# Patient Record
Sex: MALE | Race: WHITE | NOT HISPANIC OR LATINO | Employment: OTHER | URBAN - METROPOLITAN AREA
[De-identification: names, ages, dates, MRNs, and addresses within clinical notes are randomized per-mention and may not be internally consistent; named-entity substitution may affect disease eponyms.]

---

## 2017-01-16 ENCOUNTER — OFFICE VISIT - HEALTHEAST (OUTPATIENT)
Dept: INFECTIOUS DISEASES | Facility: CLINIC | Age: 52
End: 2017-01-16

## 2017-01-16 DIAGNOSIS — M46.43 DISCITIS OF CERVICOTHORACIC REGION: ICD-10-CM

## 2017-01-30 ENCOUNTER — AMBULATORY - HEALTHEAST (OUTPATIENT)
Dept: ADDICTION MEDICINE | Facility: CLINIC | Age: 52
End: 2017-01-30

## 2017-02-03 ENCOUNTER — OFFICE VISIT - HEALTHEAST (OUTPATIENT)
Dept: ADDICTION MEDICINE | Facility: CLINIC | Age: 52
End: 2017-02-03

## 2017-02-03 DIAGNOSIS — F10.10 ALCOHOL ABUSE: ICD-10-CM

## 2017-02-06 ENCOUNTER — AMBULATORY - HEALTHEAST (OUTPATIENT)
Dept: BEHAVIORAL HEALTH | Facility: CLINIC | Age: 52
End: 2017-02-06

## 2017-02-07 ENCOUNTER — AMBULATORY - HEALTHEAST (OUTPATIENT)
Dept: ADDICTION MEDICINE | Facility: CLINIC | Age: 52
End: 2017-02-07

## 2017-02-13 ENCOUNTER — AMBULATORY - HEALTHEAST (OUTPATIENT)
Dept: ADDICTION MEDICINE | Facility: CLINIC | Age: 52
End: 2017-02-13

## 2017-02-13 ENCOUNTER — OFFICE VISIT - HEALTHEAST (OUTPATIENT)
Dept: ADDICTION MEDICINE | Facility: CLINIC | Age: 52
End: 2017-02-13

## 2017-02-13 DIAGNOSIS — F12.20 CANNABIS USE DISORDER, MODERATE, DEPENDENCE (H): ICD-10-CM

## 2017-02-13 DIAGNOSIS — F15.20 AMPHETAMINE USE DISORDER, SEVERE (H): ICD-10-CM

## 2017-02-14 ENCOUNTER — COMMUNICATION - HEALTHEAST (OUTPATIENT)
Dept: ADDICTION MEDICINE | Facility: CLINIC | Age: 52
End: 2017-02-14

## 2017-02-15 ENCOUNTER — AMBULATORY - HEALTHEAST (OUTPATIENT)
Dept: ADDICTION MEDICINE | Facility: CLINIC | Age: 52
End: 2017-02-15

## 2017-02-16 ENCOUNTER — OFFICE VISIT - HEALTHEAST (OUTPATIENT)
Dept: ADDICTION MEDICINE | Facility: CLINIC | Age: 52
End: 2017-02-16

## 2017-02-16 DIAGNOSIS — F15.20 AMPHETAMINE USE DISORDER, SEVERE (H): ICD-10-CM

## 2017-02-16 DIAGNOSIS — F12.20 CANNABIS USE DISORDER, MODERATE, DEPENDENCE (H): ICD-10-CM

## 2017-02-21 ENCOUNTER — AMBULATORY - HEALTHEAST (OUTPATIENT)
Dept: ADDICTION MEDICINE | Facility: CLINIC | Age: 52
End: 2017-02-21

## 2017-02-21 ENCOUNTER — OFFICE VISIT - HEALTHEAST (OUTPATIENT)
Dept: ADDICTION MEDICINE | Facility: CLINIC | Age: 52
End: 2017-02-21

## 2017-02-21 DIAGNOSIS — F15.20 AMPHETAMINE USE DISORDER, SEVERE (H): ICD-10-CM

## 2017-02-21 DIAGNOSIS — F12.20 CANNABIS USE DISORDER, MODERATE, DEPENDENCE (H): ICD-10-CM

## 2017-02-24 ENCOUNTER — AMBULATORY - HEALTHEAST (OUTPATIENT)
Dept: ADDICTION MEDICINE | Facility: CLINIC | Age: 52
End: 2017-02-24

## 2017-03-02 ENCOUNTER — OFFICE VISIT - HEALTHEAST (OUTPATIENT)
Dept: ADDICTION MEDICINE | Facility: CLINIC | Age: 52
End: 2017-03-02

## 2017-03-02 DIAGNOSIS — F15.20 AMPHETAMINE USE DISORDER, SEVERE (H): ICD-10-CM

## 2017-03-02 DIAGNOSIS — F12.20 CANNABIS USE DISORDER, MODERATE, DEPENDENCE (H): ICD-10-CM

## 2017-03-03 ENCOUNTER — AMBULATORY - HEALTHEAST (OUTPATIENT)
Dept: ADDICTION MEDICINE | Facility: CLINIC | Age: 52
End: 2017-03-03

## 2017-03-07 ENCOUNTER — OFFICE VISIT - HEALTHEAST (OUTPATIENT)
Dept: ADDICTION MEDICINE | Facility: CLINIC | Age: 52
End: 2017-03-07

## 2017-03-07 DIAGNOSIS — Z03.89 NO DIAGNOSIS ON AXIS I: ICD-10-CM

## 2017-03-09 ENCOUNTER — OFFICE VISIT - HEALTHEAST (OUTPATIENT)
Dept: ADDICTION MEDICINE | Facility: CLINIC | Age: 52
End: 2017-03-09

## 2017-03-09 ENCOUNTER — AMBULATORY - HEALTHEAST (OUTPATIENT)
Dept: ADDICTION MEDICINE | Facility: CLINIC | Age: 52
End: 2017-03-09

## 2017-03-09 DIAGNOSIS — F12.20 CANNABIS USE DISORDER, MODERATE, DEPENDENCE (H): ICD-10-CM

## 2017-03-09 DIAGNOSIS — F15.20 AMPHETAMINE USE DISORDER, SEVERE (H): ICD-10-CM

## 2017-03-10 ENCOUNTER — AMBULATORY - HEALTHEAST (OUTPATIENT)
Dept: ADDICTION MEDICINE | Facility: CLINIC | Age: 52
End: 2017-03-10

## 2017-03-14 ENCOUNTER — OFFICE VISIT - HEALTHEAST (OUTPATIENT)
Dept: ADDICTION MEDICINE | Facility: CLINIC | Age: 52
End: 2017-03-14

## 2017-03-14 DIAGNOSIS — F12.20 CANNABIS USE DISORDER, MODERATE, DEPENDENCE (H): ICD-10-CM

## 2017-03-14 DIAGNOSIS — F15.20 AMPHETAMINE USE DISORDER, SEVERE (H): ICD-10-CM

## 2017-03-14 DIAGNOSIS — F10.10 ALCOHOL ABUSE: ICD-10-CM

## 2017-03-16 ENCOUNTER — OFFICE VISIT - HEALTHEAST (OUTPATIENT)
Dept: ADDICTION MEDICINE | Facility: CLINIC | Age: 52
End: 2017-03-16

## 2017-03-16 DIAGNOSIS — F15.20 AMPHETAMINE USE DISORDER, SEVERE (H): ICD-10-CM

## 2017-03-16 DIAGNOSIS — F10.10 ALCOHOL ABUSE: ICD-10-CM

## 2017-03-16 DIAGNOSIS — F12.20 CANNABIS USE DISORDER, MODERATE, DEPENDENCE (H): ICD-10-CM

## 2017-03-17 ENCOUNTER — AMBULATORY - HEALTHEAST (OUTPATIENT)
Dept: ADDICTION MEDICINE | Facility: CLINIC | Age: 52
End: 2017-03-17

## 2017-03-21 ENCOUNTER — OFFICE VISIT - HEALTHEAST (OUTPATIENT)
Dept: ADDICTION MEDICINE | Facility: CLINIC | Age: 52
End: 2017-03-21

## 2017-03-21 DIAGNOSIS — F15.20 AMPHETAMINE USE DISORDER, SEVERE (H): ICD-10-CM

## 2017-03-21 DIAGNOSIS — F10.10 ALCOHOL ABUSE: ICD-10-CM

## 2017-03-21 DIAGNOSIS — F12.20 CANNABIS USE DISORDER, MODERATE, DEPENDENCE (H): ICD-10-CM

## 2017-03-23 ENCOUNTER — OFFICE VISIT - HEALTHEAST (OUTPATIENT)
Dept: BEHAVIORAL HEALTH | Facility: CLINIC | Age: 52
End: 2017-03-23

## 2017-03-23 ENCOUNTER — OFFICE VISIT - HEALTHEAST (OUTPATIENT)
Dept: ADDICTION MEDICINE | Facility: CLINIC | Age: 52
End: 2017-03-23

## 2017-03-23 DIAGNOSIS — S06.9X9S TBI (TRAUMATIC BRAIN INJURY), WITH LOSS OF CONSCIOUSNESS OF UNSPECIFIED DURATION, SEQUELA: ICD-10-CM

## 2017-03-23 DIAGNOSIS — F10.10 ALCOHOL ABUSE: ICD-10-CM

## 2017-03-23 DIAGNOSIS — F12.20 CANNABIS USE DISORDER, MODERATE, DEPENDENCE (H): ICD-10-CM

## 2017-03-23 DIAGNOSIS — F15.20 METHAMPHETAMINE USE DISORDER, SEVERE (H): ICD-10-CM

## 2017-03-23 DIAGNOSIS — F15.20 AMPHETAMINE USE DISORDER, SEVERE (H): ICD-10-CM

## 2017-03-23 DIAGNOSIS — F41.9 ANXIETY: ICD-10-CM

## 2017-03-24 ENCOUNTER — AMBULATORY - HEALTHEAST (OUTPATIENT)
Dept: ADDICTION MEDICINE | Facility: CLINIC | Age: 52
End: 2017-03-24

## 2017-03-27 ENCOUNTER — AMBULATORY - HEALTHEAST (OUTPATIENT)
Dept: ADDICTION MEDICINE | Facility: CLINIC | Age: 52
End: 2017-03-27

## 2017-03-28 ENCOUNTER — OFFICE VISIT - HEALTHEAST (OUTPATIENT)
Dept: BEHAVIORAL HEALTH | Facility: CLINIC | Age: 52
End: 2017-03-28

## 2017-03-28 DIAGNOSIS — F33.1 DEPRESSION, MAJOR, RECURRENT, MODERATE (H): ICD-10-CM

## 2017-03-28 DIAGNOSIS — F43.10 PTSD (POST-TRAUMATIC STRESS DISORDER): ICD-10-CM

## 2017-03-30 ENCOUNTER — OFFICE VISIT - HEALTHEAST (OUTPATIENT)
Dept: ADDICTION MEDICINE | Facility: CLINIC | Age: 52
End: 2017-03-30

## 2017-03-30 DIAGNOSIS — F10.10 ALCOHOL ABUSE: ICD-10-CM

## 2017-03-30 DIAGNOSIS — F15.20 AMPHETAMINE USE DISORDER, SEVERE (H): ICD-10-CM

## 2017-03-30 DIAGNOSIS — F12.20 CANNABIS USE DISORDER, MODERATE, DEPENDENCE (H): ICD-10-CM

## 2017-03-31 ENCOUNTER — AMBULATORY - HEALTHEAST (OUTPATIENT)
Dept: ADDICTION MEDICINE | Facility: CLINIC | Age: 52
End: 2017-03-31

## 2017-04-05 ENCOUNTER — COMMUNICATION - HEALTHEAST (OUTPATIENT)
Dept: ADDICTION MEDICINE | Facility: CLINIC | Age: 52
End: 2017-04-05

## 2017-04-06 ENCOUNTER — COMMUNICATION - HEALTHEAST (OUTPATIENT)
Dept: ADDICTION MEDICINE | Facility: CLINIC | Age: 52
End: 2017-04-06

## 2017-04-11 ENCOUNTER — OFFICE VISIT - HEALTHEAST (OUTPATIENT)
Dept: ADDICTION MEDICINE | Facility: CLINIC | Age: 52
End: 2017-04-11

## 2017-04-11 DIAGNOSIS — F15.20 AMPHETAMINE USE DISORDER, SEVERE (H): ICD-10-CM

## 2017-04-11 DIAGNOSIS — F12.20 CANNABIS USE DISORDER, MODERATE, DEPENDENCE (H): ICD-10-CM

## 2017-04-12 ENCOUNTER — OFFICE VISIT - HEALTHEAST (OUTPATIENT)
Dept: BEHAVIORAL HEALTH | Facility: CLINIC | Age: 52
End: 2017-04-12

## 2017-04-12 DIAGNOSIS — F15.20 METHAMPHETAMINE USE DISORDER, SEVERE (H): ICD-10-CM

## 2017-04-12 DIAGNOSIS — F33.1 DEPRESSION, MAJOR, RECURRENT, MODERATE (H): ICD-10-CM

## 2017-04-12 DIAGNOSIS — F43.10 PTSD (POST-TRAUMATIC STRESS DISORDER): ICD-10-CM

## 2017-04-13 ENCOUNTER — OFFICE VISIT - HEALTHEAST (OUTPATIENT)
Dept: ADDICTION MEDICINE | Facility: CLINIC | Age: 52
End: 2017-04-13

## 2017-04-13 DIAGNOSIS — F12.20 CANNABIS USE DISORDER, MODERATE, DEPENDENCE (H): ICD-10-CM

## 2017-04-14 ENCOUNTER — AMBULATORY - HEALTHEAST (OUTPATIENT)
Dept: ADDICTION MEDICINE | Facility: CLINIC | Age: 52
End: 2017-04-14

## 2017-04-18 ENCOUNTER — OFFICE VISIT - HEALTHEAST (OUTPATIENT)
Dept: ADDICTION MEDICINE | Facility: CLINIC | Age: 52
End: 2017-04-18

## 2017-04-18 DIAGNOSIS — F15.20 AMPHETAMINE USE DISORDER, SEVERE (H): ICD-10-CM

## 2017-04-18 DIAGNOSIS — F12.20 CANNABIS USE DISORDER, MODERATE, DEPENDENCE (H): ICD-10-CM

## 2017-04-21 ENCOUNTER — COMMUNICATION - HEALTHEAST (OUTPATIENT)
Dept: ADDICTION MEDICINE | Facility: CLINIC | Age: 52
End: 2017-04-21

## 2017-04-25 ENCOUNTER — OFFICE VISIT - HEALTHEAST (OUTPATIENT)
Dept: ADDICTION MEDICINE | Facility: CLINIC | Age: 52
End: 2017-04-25

## 2017-04-25 DIAGNOSIS — F12.20 CANNABIS USE DISORDER, MODERATE, DEPENDENCE (H): ICD-10-CM

## 2017-04-25 DIAGNOSIS — F15.20 AMPHETAMINE USE DISORDER, SEVERE (H): ICD-10-CM

## 2017-04-27 ENCOUNTER — OFFICE VISIT - HEALTHEAST (OUTPATIENT)
Dept: ADDICTION MEDICINE | Facility: CLINIC | Age: 52
End: 2017-04-27

## 2017-04-27 DIAGNOSIS — F15.20 AMPHETAMINE USE DISORDER, SEVERE (H): ICD-10-CM

## 2017-04-27 DIAGNOSIS — F12.20 CANNABIS USE DISORDER, MODERATE, DEPENDENCE (H): ICD-10-CM

## 2017-04-28 ENCOUNTER — AMBULATORY - HEALTHEAST (OUTPATIENT)
Dept: ADDICTION MEDICINE | Facility: CLINIC | Age: 52
End: 2017-04-28

## 2017-05-02 ENCOUNTER — OFFICE VISIT - HEALTHEAST (OUTPATIENT)
Dept: ADDICTION MEDICINE | Facility: CLINIC | Age: 52
End: 2017-05-02

## 2017-05-02 DIAGNOSIS — F12.20 CANNABIS USE DISORDER, MODERATE, DEPENDENCE (H): ICD-10-CM

## 2017-05-02 DIAGNOSIS — F15.20 AMPHETAMINE USE DISORDER, SEVERE (H): ICD-10-CM

## 2017-05-05 ENCOUNTER — AMBULATORY - HEALTHEAST (OUTPATIENT)
Dept: ADDICTION MEDICINE | Facility: CLINIC | Age: 52
End: 2017-05-05

## 2017-05-30 ENCOUNTER — AMBULATORY - HEALTHEAST (OUTPATIENT)
Dept: ADDICTION MEDICINE | Facility: CLINIC | Age: 52
End: 2017-05-30

## 2017-06-01 ENCOUNTER — OFFICE VISIT - HEALTHEAST (OUTPATIENT)
Dept: ADDICTION MEDICINE | Facility: CLINIC | Age: 52
End: 2017-06-01

## 2017-06-01 DIAGNOSIS — F12.20 CANNABIS USE DISORDER, MODERATE, DEPENDENCE (H): ICD-10-CM

## 2017-06-01 DIAGNOSIS — F15.20 AMPHETAMINE USE DISORDER, SEVERE (H): ICD-10-CM

## 2017-06-01 DIAGNOSIS — F10.10 ALCOHOL ABUSE: ICD-10-CM

## 2017-06-02 ENCOUNTER — COMMUNICATION - HEALTHEAST (OUTPATIENT)
Dept: ADDICTION MEDICINE | Facility: CLINIC | Age: 52
End: 2017-06-02

## 2017-06-02 ENCOUNTER — AMBULATORY - HEALTHEAST (OUTPATIENT)
Dept: ADDICTION MEDICINE | Facility: CLINIC | Age: 52
End: 2017-06-02

## 2017-06-05 ENCOUNTER — AMBULATORY - HEALTHEAST (OUTPATIENT)
Dept: ADDICTION MEDICINE | Facility: CLINIC | Age: 52
End: 2017-06-05

## 2017-06-08 ENCOUNTER — AMBULATORY - HEALTHEAST (OUTPATIENT)
Dept: LAB | Facility: CLINIC | Age: 52
End: 2017-06-08

## 2017-06-08 ENCOUNTER — OFFICE VISIT - HEALTHEAST (OUTPATIENT)
Dept: ADDICTION MEDICINE | Facility: CLINIC | Age: 52
End: 2017-06-08

## 2017-06-08 DIAGNOSIS — F15.20 AMPHETAMINE USE DISORDER, SEVERE (H): ICD-10-CM

## 2017-06-08 DIAGNOSIS — F12.20 CANNABIS USE DISORDER, MODERATE, DEPENDENCE (H): ICD-10-CM

## 2017-06-13 ENCOUNTER — OFFICE VISIT - HEALTHEAST (OUTPATIENT)
Dept: ADDICTION MEDICINE | Facility: CLINIC | Age: 52
End: 2017-06-13

## 2017-06-13 DIAGNOSIS — F15.20 AMPHETAMINE USE DISORDER, SEVERE (H): ICD-10-CM

## 2017-06-13 DIAGNOSIS — F12.20 CANNABIS USE DISORDER, MODERATE, DEPENDENCE (H): ICD-10-CM

## 2017-06-15 ENCOUNTER — AMBULATORY - HEALTHEAST (OUTPATIENT)
Dept: ADDICTION MEDICINE | Facility: CLINIC | Age: 52
End: 2017-06-15

## 2017-06-15 ENCOUNTER — OFFICE VISIT - HEALTHEAST (OUTPATIENT)
Dept: ADDICTION MEDICINE | Facility: CLINIC | Age: 52
End: 2017-06-15

## 2017-06-15 DIAGNOSIS — F12.20 CANNABIS USE DISORDER, MODERATE, DEPENDENCE (H): ICD-10-CM

## 2017-06-15 DIAGNOSIS — F15.20 AMPHETAMINE USE DISORDER, SEVERE (H): ICD-10-CM

## 2017-06-16 ENCOUNTER — AMBULATORY - HEALTHEAST (OUTPATIENT)
Dept: ADDICTION MEDICINE | Facility: CLINIC | Age: 52
End: 2017-06-16

## 2017-06-20 ENCOUNTER — AMBULATORY - HEALTHEAST (OUTPATIENT)
Dept: LAB | Facility: CLINIC | Age: 52
End: 2017-06-20

## 2017-06-20 ENCOUNTER — OFFICE VISIT - HEALTHEAST (OUTPATIENT)
Dept: ADDICTION MEDICINE | Facility: CLINIC | Age: 52
End: 2017-06-20

## 2017-06-20 DIAGNOSIS — F15.20 AMPHETAMINE USE DISORDER, SEVERE (H): ICD-10-CM

## 2017-06-20 DIAGNOSIS — F12.20 CANNABIS USE DISORDER, MODERATE, DEPENDENCE (H): ICD-10-CM

## 2017-06-22 ENCOUNTER — OFFICE VISIT - HEALTHEAST (OUTPATIENT)
Dept: ADDICTION MEDICINE | Facility: CLINIC | Age: 52
End: 2017-06-22

## 2017-06-22 DIAGNOSIS — F15.20 AMPHETAMINE USE DISORDER, SEVERE (H): ICD-10-CM

## 2017-06-22 DIAGNOSIS — F12.20 CANNABIS USE DISORDER, MODERATE, DEPENDENCE (H): ICD-10-CM

## 2017-06-23 ENCOUNTER — AMBULATORY - HEALTHEAST (OUTPATIENT)
Dept: ADDICTION MEDICINE | Facility: CLINIC | Age: 52
End: 2017-06-23

## 2017-06-23 ENCOUNTER — COMMUNICATION - HEALTHEAST (OUTPATIENT)
Dept: ADDICTION MEDICINE | Facility: CLINIC | Age: 52
End: 2017-06-23

## 2017-06-27 ENCOUNTER — OFFICE VISIT - HEALTHEAST (OUTPATIENT)
Dept: ADDICTION MEDICINE | Facility: CLINIC | Age: 52
End: 2017-06-27

## 2017-06-27 ENCOUNTER — AMBULATORY - HEALTHEAST (OUTPATIENT)
Dept: LAB | Facility: CLINIC | Age: 52
End: 2017-06-27

## 2017-06-27 DIAGNOSIS — F15.20 AMPHETAMINE USE DISORDER, SEVERE (H): ICD-10-CM

## 2017-06-27 DIAGNOSIS — F12.20 CANNABIS USE DISORDER, MODERATE, DEPENDENCE (H): ICD-10-CM

## 2017-06-29 ENCOUNTER — AMBULATORY - HEALTHEAST (OUTPATIENT)
Dept: ADDICTION MEDICINE | Facility: CLINIC | Age: 52
End: 2017-06-29

## 2017-06-30 ENCOUNTER — AMBULATORY - HEALTHEAST (OUTPATIENT)
Dept: ADDICTION MEDICINE | Facility: CLINIC | Age: 52
End: 2017-06-30

## 2017-07-11 ENCOUNTER — AMBULATORY - HEALTHEAST (OUTPATIENT)
Dept: LAB | Facility: CLINIC | Age: 52
End: 2017-07-11

## 2017-07-11 ENCOUNTER — OFFICE VISIT - HEALTHEAST (OUTPATIENT)
Dept: ADDICTION MEDICINE | Facility: CLINIC | Age: 52
End: 2017-07-11

## 2017-07-11 DIAGNOSIS — F15.20 AMPHETAMINE USE DISORDER, SEVERE (H): ICD-10-CM

## 2017-07-11 DIAGNOSIS — F12.20 CANNABIS USE DISORDER, MODERATE, DEPENDENCE (H): ICD-10-CM

## 2017-07-13 ENCOUNTER — AMBULATORY - HEALTHEAST (OUTPATIENT)
Dept: LAB | Facility: CLINIC | Age: 52
End: 2017-07-13

## 2017-07-13 ENCOUNTER — OFFICE VISIT - HEALTHEAST (OUTPATIENT)
Dept: ADDICTION MEDICINE | Facility: CLINIC | Age: 52
End: 2017-07-13

## 2017-07-13 DIAGNOSIS — F15.20 AMPHETAMINE USE DISORDER, SEVERE (H): ICD-10-CM

## 2017-07-13 DIAGNOSIS — F12.20 CANNABIS USE DISORDER, MODERATE, DEPENDENCE (H): ICD-10-CM

## 2017-07-15 ENCOUNTER — AMBULATORY - HEALTHEAST (OUTPATIENT)
Dept: ADDICTION MEDICINE | Facility: CLINIC | Age: 52
End: 2017-07-15

## 2017-07-18 ENCOUNTER — OFFICE VISIT - HEALTHEAST (OUTPATIENT)
Dept: ADDICTION MEDICINE | Facility: CLINIC | Age: 52
End: 2017-07-18

## 2017-07-18 ENCOUNTER — AMBULATORY - HEALTHEAST (OUTPATIENT)
Dept: LAB | Facility: CLINIC | Age: 52
End: 2017-07-18

## 2017-07-18 DIAGNOSIS — F12.20 CANNABIS USE DISORDER, MODERATE, DEPENDENCE (H): ICD-10-CM

## 2017-07-18 DIAGNOSIS — F15.20 AMPHETAMINE USE DISORDER, SEVERE (H): ICD-10-CM

## 2017-07-20 ENCOUNTER — AMBULATORY - HEALTHEAST (OUTPATIENT)
Dept: LAB | Facility: CLINIC | Age: 52
End: 2017-07-20

## 2017-07-20 ENCOUNTER — OFFICE VISIT - HEALTHEAST (OUTPATIENT)
Dept: ADDICTION MEDICINE | Facility: CLINIC | Age: 52
End: 2017-07-20

## 2017-07-20 DIAGNOSIS — F12.20 CANNABIS USE DISORDER, MODERATE, DEPENDENCE (H): ICD-10-CM

## 2017-07-20 DIAGNOSIS — F15.20 AMPHETAMINE USE DISORDER, SEVERE (H): ICD-10-CM

## 2017-07-22 ENCOUNTER — AMBULATORY - HEALTHEAST (OUTPATIENT)
Dept: ADDICTION MEDICINE | Facility: CLINIC | Age: 52
End: 2017-07-22

## 2017-07-25 ENCOUNTER — OFFICE VISIT - HEALTHEAST (OUTPATIENT)
Dept: ADDICTION MEDICINE | Facility: CLINIC | Age: 52
End: 2017-07-25

## 2017-07-25 ENCOUNTER — AMBULATORY - HEALTHEAST (OUTPATIENT)
Dept: LAB | Facility: CLINIC | Age: 52
End: 2017-07-25

## 2017-07-25 DIAGNOSIS — F12.20 CANNABIS USE DISORDER, MODERATE, DEPENDENCE (H): ICD-10-CM

## 2017-07-25 DIAGNOSIS — F15.20 AMPHETAMINE USE DISORDER, SEVERE (H): ICD-10-CM

## 2017-07-26 ENCOUNTER — OFFICE VISIT - HEALTHEAST (OUTPATIENT)
Dept: ADDICTION MEDICINE | Facility: CLINIC | Age: 52
End: 2017-07-26

## 2017-07-26 DIAGNOSIS — F12.20 CANNABIS USE DISORDER, MODERATE, DEPENDENCE (H): ICD-10-CM

## 2017-07-26 DIAGNOSIS — F15.20 AMPHETAMINE USE DISORDER, SEVERE (H): ICD-10-CM

## 2017-07-27 ENCOUNTER — OFFICE VISIT - HEALTHEAST (OUTPATIENT)
Dept: ADDICTION MEDICINE | Facility: CLINIC | Age: 52
End: 2017-07-27

## 2017-07-27 ENCOUNTER — AMBULATORY - HEALTHEAST (OUTPATIENT)
Dept: LAB | Facility: CLINIC | Age: 52
End: 2017-07-27

## 2017-07-27 DIAGNOSIS — F15.20 AMPHETAMINE USE DISORDER, SEVERE (H): ICD-10-CM

## 2017-07-27 DIAGNOSIS — F12.20 CANNABIS USE DISORDER, MODERATE, DEPENDENCE (H): ICD-10-CM

## 2017-07-29 ENCOUNTER — AMBULATORY - HEALTHEAST (OUTPATIENT)
Dept: ADDICTION MEDICINE | Facility: CLINIC | Age: 52
End: 2017-07-29

## 2017-08-01 ENCOUNTER — OFFICE VISIT - HEALTHEAST (OUTPATIENT)
Dept: ADDICTION MEDICINE | Facility: CLINIC | Age: 52
End: 2017-08-01

## 2017-08-01 DIAGNOSIS — F15.20 AMPHETAMINE USE DISORDER, SEVERE (H): ICD-10-CM

## 2017-08-01 DIAGNOSIS — F12.20 CANNABIS USE DISORDER, MODERATE, DEPENDENCE (H): ICD-10-CM

## 2017-08-03 ENCOUNTER — OFFICE VISIT - HEALTHEAST (OUTPATIENT)
Dept: ADDICTION MEDICINE | Facility: CLINIC | Age: 52
End: 2017-08-03

## 2017-08-03 DIAGNOSIS — F15.20 AMPHETAMINE USE DISORDER, SEVERE (H): ICD-10-CM

## 2017-08-03 DIAGNOSIS — F12.20 CANNABIS USE DISORDER, MODERATE, DEPENDENCE (H): ICD-10-CM

## 2017-08-08 ENCOUNTER — AMBULATORY - HEALTHEAST (OUTPATIENT)
Dept: LAB | Facility: CLINIC | Age: 52
End: 2017-08-08

## 2017-08-08 ENCOUNTER — OFFICE VISIT - HEALTHEAST (OUTPATIENT)
Dept: ADDICTION MEDICINE | Facility: CLINIC | Age: 52
End: 2017-08-08

## 2017-08-08 DIAGNOSIS — F15.20 AMPHETAMINE USE DISORDER, SEVERE (H): ICD-10-CM

## 2017-08-08 DIAGNOSIS — F12.20 CANNABIS USE DISORDER, MODERATE, DEPENDENCE (H): ICD-10-CM

## 2017-08-10 ENCOUNTER — AMBULATORY - HEALTHEAST (OUTPATIENT)
Dept: ADDICTION MEDICINE | Facility: CLINIC | Age: 52
End: 2017-08-10

## 2017-08-10 ENCOUNTER — OFFICE VISIT - HEALTHEAST (OUTPATIENT)
Dept: ADDICTION MEDICINE | Facility: CLINIC | Age: 52
End: 2017-08-10

## 2017-08-10 DIAGNOSIS — F12.20 CANNABIS USE DISORDER, MODERATE, DEPENDENCE (H): ICD-10-CM

## 2017-08-10 DIAGNOSIS — F15.20 AMPHETAMINE USE DISORDER, SEVERE (H): ICD-10-CM

## 2017-08-17 ENCOUNTER — AMBULATORY - HEALTHEAST (OUTPATIENT)
Dept: LAB | Facility: CLINIC | Age: 52
End: 2017-08-17

## 2017-08-17 ENCOUNTER — OFFICE VISIT - HEALTHEAST (OUTPATIENT)
Dept: ADDICTION MEDICINE | Facility: CLINIC | Age: 52
End: 2017-08-17

## 2017-08-17 DIAGNOSIS — F15.20 AMPHETAMINE USE DISORDER, SEVERE (H): ICD-10-CM

## 2017-08-17 DIAGNOSIS — F12.20 CANNABIS USE DISORDER, MODERATE, DEPENDENCE (H): ICD-10-CM

## 2017-08-18 ENCOUNTER — AMBULATORY - HEALTHEAST (OUTPATIENT)
Dept: ADDICTION MEDICINE | Facility: CLINIC | Age: 52
End: 2017-08-18

## 2017-08-25 ENCOUNTER — AMBULATORY - HEALTHEAST (OUTPATIENT)
Dept: ADDICTION MEDICINE | Facility: CLINIC | Age: 52
End: 2017-08-25

## 2017-08-25 ENCOUNTER — COMMUNICATION - HEALTHEAST (OUTPATIENT)
Dept: ADDICTION MEDICINE | Facility: CLINIC | Age: 52
End: 2017-08-25

## 2017-08-29 ENCOUNTER — AMBULATORY - HEALTHEAST (OUTPATIENT)
Dept: LAB | Facility: CLINIC | Age: 52
End: 2017-08-29

## 2017-08-29 ENCOUNTER — OFFICE VISIT - HEALTHEAST (OUTPATIENT)
Dept: ADDICTION MEDICINE | Facility: CLINIC | Age: 52
End: 2017-08-29

## 2017-08-29 DIAGNOSIS — F12.20 CANNABIS USE DISORDER, MODERATE, DEPENDENCE (H): ICD-10-CM

## 2017-08-29 DIAGNOSIS — F15.20 AMPHETAMINE USE DISORDER, SEVERE (H): ICD-10-CM

## 2017-08-31 ENCOUNTER — OFFICE VISIT - HEALTHEAST (OUTPATIENT)
Dept: ADDICTION MEDICINE | Facility: CLINIC | Age: 52
End: 2017-08-31

## 2017-08-31 DIAGNOSIS — F15.20 AMPHETAMINE USE DISORDER, SEVERE (H): ICD-10-CM

## 2017-08-31 DIAGNOSIS — F12.20 CANNABIS USE DISORDER, MODERATE, DEPENDENCE (H): ICD-10-CM

## 2017-09-01 ENCOUNTER — AMBULATORY - HEALTHEAST (OUTPATIENT)
Dept: ADDICTION MEDICINE | Facility: CLINIC | Age: 52
End: 2017-09-01

## 2017-09-05 ENCOUNTER — COMMUNICATION - HEALTHEAST (OUTPATIENT)
Dept: ADDICTION MEDICINE | Facility: CLINIC | Age: 52
End: 2017-09-05

## 2017-09-06 ENCOUNTER — AMBULATORY - HEALTHEAST (OUTPATIENT)
Dept: ADDICTION MEDICINE | Facility: CLINIC | Age: 52
End: 2017-09-06

## 2017-09-06 DIAGNOSIS — F12.20 CANNABIS USE DISORDER, MODERATE, DEPENDENCE (H): ICD-10-CM

## 2017-09-06 DIAGNOSIS — F15.20 AMPHETAMINE USE DISORDER, SEVERE (H): ICD-10-CM

## 2017-09-07 ENCOUNTER — OFFICE VISIT - HEALTHEAST (OUTPATIENT)
Dept: ADDICTION MEDICINE | Facility: CLINIC | Age: 52
End: 2017-09-07

## 2017-09-07 ENCOUNTER — AMBULATORY - HEALTHEAST (OUTPATIENT)
Dept: ADDICTION MEDICINE | Facility: CLINIC | Age: 52
End: 2017-09-07

## 2017-09-07 DIAGNOSIS — F12.20 CANNABIS USE DISORDER, MODERATE, DEPENDENCE (H): ICD-10-CM

## 2017-09-07 DIAGNOSIS — F15.20 AMPHETAMINE USE DISORDER, SEVERE (H): ICD-10-CM

## 2017-09-11 ENCOUNTER — OFFICE VISIT - HEALTHEAST (OUTPATIENT)
Dept: ADDICTION MEDICINE | Facility: CLINIC | Age: 52
End: 2017-09-11

## 2017-09-11 DIAGNOSIS — F12.20 CANNABIS USE DISORDER, MODERATE, DEPENDENCE (H): ICD-10-CM

## 2017-09-11 DIAGNOSIS — F15.20 AMPHETAMINE USE DISORDER, SEVERE (H): ICD-10-CM

## 2017-09-12 ENCOUNTER — COMMUNICATION - HEALTHEAST (OUTPATIENT)
Dept: ADDICTION MEDICINE | Facility: CLINIC | Age: 52
End: 2017-09-12

## 2017-09-14 ENCOUNTER — AMBULATORY - HEALTHEAST (OUTPATIENT)
Dept: LAB | Facility: CLINIC | Age: 52
End: 2017-09-14

## 2017-09-14 ENCOUNTER — OFFICE VISIT - HEALTHEAST (OUTPATIENT)
Dept: ADDICTION MEDICINE | Facility: CLINIC | Age: 52
End: 2017-09-14

## 2017-09-14 ENCOUNTER — AMBULATORY - HEALTHEAST (OUTPATIENT)
Dept: ADDICTION MEDICINE | Facility: CLINIC | Age: 52
End: 2017-09-14

## 2017-09-14 ENCOUNTER — AMBULATORY - HEALTHEAST (OUTPATIENT)
Dept: BEHAVIORAL HEALTH | Facility: CLINIC | Age: 52
End: 2017-09-14

## 2017-09-14 DIAGNOSIS — F11.20 OPIOID USE DISORDER, MODERATE, DEPENDENCE (H): ICD-10-CM

## 2017-09-14 DIAGNOSIS — F15.20 AMPHETAMINE USE DISORDER, SEVERE (H): ICD-10-CM

## 2017-09-14 DIAGNOSIS — F12.20 CANNABIS USE DISORDER, MODERATE, DEPENDENCE (H): ICD-10-CM

## 2017-09-18 ENCOUNTER — AMBULATORY - HEALTHEAST (OUTPATIENT)
Dept: LAB | Facility: CLINIC | Age: 52
End: 2017-09-18

## 2017-09-18 DIAGNOSIS — F11.20 OPIOID USE DISORDER, MODERATE, DEPENDENCE (H): ICD-10-CM

## 2017-09-19 ENCOUNTER — OFFICE VISIT - HEALTHEAST (OUTPATIENT)
Dept: ADDICTION MEDICINE | Facility: CLINIC | Age: 52
End: 2017-09-19

## 2017-09-19 ENCOUNTER — AMBULATORY - HEALTHEAST (OUTPATIENT)
Dept: LAB | Facility: CLINIC | Age: 52
End: 2017-09-19

## 2017-09-19 DIAGNOSIS — F12.20 CANNABIS USE DISORDER, MODERATE, DEPENDENCE (H): ICD-10-CM

## 2017-09-19 DIAGNOSIS — F15.20 AMPHETAMINE USE DISORDER, SEVERE (H): ICD-10-CM

## 2017-09-19 DIAGNOSIS — F11.20 OPIOID USE DISORDER, MODERATE, DEPENDENCE (H): ICD-10-CM

## 2017-09-21 ENCOUNTER — OFFICE VISIT - HEALTHEAST (OUTPATIENT)
Dept: ADDICTION MEDICINE | Facility: CLINIC | Age: 52
End: 2017-09-21

## 2017-09-21 ENCOUNTER — OFFICE VISIT - HEALTHEAST (OUTPATIENT)
Dept: BEHAVIORAL HEALTH | Facility: CLINIC | Age: 52
End: 2017-09-21

## 2017-09-21 DIAGNOSIS — F41.9 ANXIETY: ICD-10-CM

## 2017-09-21 DIAGNOSIS — F12.20 CANNABIS USE DISORDER, MODERATE, DEPENDENCE (H): ICD-10-CM

## 2017-09-21 DIAGNOSIS — F33.1 DEPRESSION, MAJOR, RECURRENT, MODERATE (H): ICD-10-CM

## 2017-09-21 DIAGNOSIS — F15.20 AMPHETAMINE USE DISORDER, SEVERE (H): ICD-10-CM

## 2017-09-26 ENCOUNTER — OFFICE VISIT - HEALTHEAST (OUTPATIENT)
Dept: ADDICTION MEDICINE | Facility: CLINIC | Age: 52
End: 2017-09-26

## 2017-09-26 DIAGNOSIS — F15.20 AMPHETAMINE USE DISORDER, SEVERE (H): ICD-10-CM

## 2017-09-26 DIAGNOSIS — F12.20 CANNABIS USE DISORDER, MODERATE, DEPENDENCE (H): ICD-10-CM

## 2017-09-27 ENCOUNTER — OFFICE VISIT - HEALTHEAST (OUTPATIENT)
Dept: ADDICTION MEDICINE | Facility: CLINIC | Age: 52
End: 2017-09-27

## 2017-09-27 ENCOUNTER — AMBULATORY - HEALTHEAST (OUTPATIENT)
Dept: LAB | Facility: CLINIC | Age: 52
End: 2017-09-27

## 2017-09-27 DIAGNOSIS — F15.20 AMPHETAMINE USE DISORDER, SEVERE (H): ICD-10-CM

## 2017-09-27 DIAGNOSIS — F12.20 CANNABIS USE DISORDER, MODERATE, DEPENDENCE (H): ICD-10-CM

## 2017-09-27 DIAGNOSIS — F11.20 OPIOID USE DISORDER, MODERATE, DEPENDENCE (H): ICD-10-CM

## 2017-09-28 ENCOUNTER — OFFICE VISIT - HEALTHEAST (OUTPATIENT)
Dept: ADDICTION MEDICINE | Facility: CLINIC | Age: 52
End: 2017-09-28

## 2017-09-28 ENCOUNTER — AMBULATORY - HEALTHEAST (OUTPATIENT)
Dept: ADDICTION MEDICINE | Facility: CLINIC | Age: 52
End: 2017-09-28

## 2017-09-28 DIAGNOSIS — F15.20 AMPHETAMINE USE DISORDER, SEVERE (H): ICD-10-CM

## 2017-09-28 DIAGNOSIS — F12.20 CANNABIS USE DISORDER, MODERATE, DEPENDENCE (H): ICD-10-CM

## 2017-09-29 ENCOUNTER — AMBULATORY - HEALTHEAST (OUTPATIENT)
Dept: ADDICTION MEDICINE | Facility: CLINIC | Age: 52
End: 2017-09-29

## 2017-10-03 ENCOUNTER — AMBULATORY - HEALTHEAST (OUTPATIENT)
Dept: ADDICTION MEDICINE | Facility: CLINIC | Age: 52
End: 2017-10-03

## 2017-10-05 ENCOUNTER — AMBULATORY - HEALTHEAST (OUTPATIENT)
Dept: ADDICTION MEDICINE | Facility: CLINIC | Age: 52
End: 2017-10-05

## 2017-10-10 ENCOUNTER — COMMUNICATION - HEALTHEAST (OUTPATIENT)
Dept: ADDICTION MEDICINE | Facility: CLINIC | Age: 52
End: 2017-10-10

## 2017-10-12 ENCOUNTER — AMBULATORY - HEALTHEAST (OUTPATIENT)
Dept: LAB | Facility: CLINIC | Age: 52
End: 2017-10-12

## 2017-10-12 ENCOUNTER — AMBULATORY - HEALTHEAST (OUTPATIENT)
Dept: ADDICTION MEDICINE | Facility: CLINIC | Age: 52
End: 2017-10-12

## 2017-10-12 DIAGNOSIS — F11.20 OPIOID USE DISORDER, MODERATE, DEPENDENCE (H): ICD-10-CM

## 2017-10-17 ENCOUNTER — AMBULATORY - HEALTHEAST (OUTPATIENT)
Dept: ADDICTION MEDICINE | Facility: CLINIC | Age: 52
End: 2017-10-17

## 2017-10-18 ENCOUNTER — COMMUNICATION - HEALTHEAST (OUTPATIENT)
Dept: ADDICTION MEDICINE | Facility: CLINIC | Age: 52
End: 2017-10-18

## 2017-10-19 ENCOUNTER — AMBULATORY - HEALTHEAST (OUTPATIENT)
Dept: ADDICTION MEDICINE | Facility: CLINIC | Age: 52
End: 2017-10-19

## 2017-10-24 ENCOUNTER — OFFICE VISIT - HEALTHEAST (OUTPATIENT)
Dept: ADDICTION MEDICINE | Facility: CLINIC | Age: 52
End: 2017-10-24

## 2017-10-24 DIAGNOSIS — F15.20 AMPHETAMINE USE DISORDER, SEVERE (H): ICD-10-CM

## 2017-10-24 DIAGNOSIS — F12.20 CANNABIS USE DISORDER, MODERATE, DEPENDENCE (H): ICD-10-CM

## 2017-10-26 ENCOUNTER — AMBULATORY - HEALTHEAST (OUTPATIENT)
Dept: ADDICTION MEDICINE | Facility: CLINIC | Age: 52
End: 2017-10-26

## 2017-10-27 ENCOUNTER — AMBULATORY - HEALTHEAST (OUTPATIENT)
Dept: ADDICTION MEDICINE | Facility: CLINIC | Age: 52
End: 2017-10-27

## 2017-10-31 ENCOUNTER — AMBULATORY - HEALTHEAST (OUTPATIENT)
Dept: ADDICTION MEDICINE | Facility: CLINIC | Age: 52
End: 2017-10-31

## 2017-11-07 ENCOUNTER — AMBULATORY - HEALTHEAST (OUTPATIENT)
Dept: ADDICTION MEDICINE | Facility: CLINIC | Age: 52
End: 2017-11-07

## 2017-11-14 ENCOUNTER — OFFICE VISIT - HEALTHEAST (OUTPATIENT)
Dept: ADDICTION MEDICINE | Facility: CLINIC | Age: 52
End: 2017-11-14

## 2017-11-14 DIAGNOSIS — F15.20 AMPHETAMINE USE DISORDER, SEVERE (H): ICD-10-CM

## 2017-11-14 DIAGNOSIS — F12.20 CANNABIS USE DISORDER, MODERATE, DEPENDENCE (H): ICD-10-CM

## 2017-11-16 ENCOUNTER — AMBULATORY - HEALTHEAST (OUTPATIENT)
Dept: ADDICTION MEDICINE | Facility: CLINIC | Age: 52
End: 2017-11-16

## 2017-11-16 ENCOUNTER — AMBULATORY - HEALTHEAST (OUTPATIENT)
Dept: LAB | Facility: CLINIC | Age: 52
End: 2017-11-16

## 2017-11-16 ENCOUNTER — OFFICE VISIT - HEALTHEAST (OUTPATIENT)
Dept: ADDICTION MEDICINE | Facility: CLINIC | Age: 52
End: 2017-11-16

## 2017-11-16 DIAGNOSIS — F15.20 AMPHETAMINE USE DISORDER, SEVERE (H): ICD-10-CM

## 2017-11-16 DIAGNOSIS — F12.20 CANNABIS USE DISORDER, MODERATE, DEPENDENCE (H): ICD-10-CM

## 2017-11-16 DIAGNOSIS — F11.20 OPIOID USE DISORDER, MODERATE, DEPENDENCE (H): ICD-10-CM

## 2017-11-21 ENCOUNTER — AMBULATORY - HEALTHEAST (OUTPATIENT)
Dept: LAB | Facility: CLINIC | Age: 52
End: 2017-11-21

## 2017-11-21 ENCOUNTER — AMBULATORY - HEALTHEAST (OUTPATIENT)
Dept: ADDICTION MEDICINE | Facility: CLINIC | Age: 52
End: 2017-11-21

## 2017-11-21 ENCOUNTER — OFFICE VISIT - HEALTHEAST (OUTPATIENT)
Dept: ADDICTION MEDICINE | Facility: CLINIC | Age: 52
End: 2017-11-21

## 2017-11-21 DIAGNOSIS — F11.20 OPIOID USE DISORDER, MODERATE, DEPENDENCE (H): ICD-10-CM

## 2017-11-21 DIAGNOSIS — F15.20 AMPHETAMINE USE DISORDER, SEVERE (H): ICD-10-CM

## 2017-11-21 DIAGNOSIS — F12.20 CANNABIS USE DISORDER, MODERATE, DEPENDENCE (H): ICD-10-CM

## 2017-11-27 ENCOUNTER — AMBULATORY - HEALTHEAST (OUTPATIENT)
Dept: ADDICTION MEDICINE | Facility: CLINIC | Age: 52
End: 2017-11-27

## 2017-11-28 ENCOUNTER — OFFICE VISIT - HEALTHEAST (OUTPATIENT)
Dept: ADDICTION MEDICINE | Facility: CLINIC | Age: 52
End: 2017-11-28

## 2017-11-28 DIAGNOSIS — F12.20 CANNABIS USE DISORDER, MODERATE, DEPENDENCE (H): ICD-10-CM

## 2017-11-28 DIAGNOSIS — F15.20 AMPHETAMINE USE DISORDER, SEVERE (H): ICD-10-CM

## 2017-11-30 ENCOUNTER — AMBULATORY - HEALTHEAST (OUTPATIENT)
Dept: ADDICTION MEDICINE | Facility: CLINIC | Age: 52
End: 2017-11-30

## 2017-12-01 ENCOUNTER — OFFICE VISIT - HEALTHEAST (OUTPATIENT)
Dept: ADDICTION MEDICINE | Facility: CLINIC | Age: 52
End: 2017-12-01

## 2017-12-01 ENCOUNTER — AMBULATORY - HEALTHEAST (OUTPATIENT)
Dept: ADDICTION MEDICINE | Facility: CLINIC | Age: 52
End: 2017-12-01

## 2017-12-01 DIAGNOSIS — F15.20 AMPHETAMINE USE DISORDER, SEVERE (H): ICD-10-CM

## 2017-12-01 DIAGNOSIS — F12.20 CANNABIS USE DISORDER, MODERATE, DEPENDENCE (H): ICD-10-CM

## 2017-12-05 ENCOUNTER — OFFICE VISIT - HEALTHEAST (OUTPATIENT)
Dept: ADDICTION MEDICINE | Facility: CLINIC | Age: 52
End: 2017-12-05

## 2017-12-05 DIAGNOSIS — F12.20 CANNABIS USE DISORDER, MODERATE, DEPENDENCE (H): ICD-10-CM

## 2017-12-05 DIAGNOSIS — F15.20 AMPHETAMINE USE DISORDER, SEVERE (H): ICD-10-CM

## 2017-12-07 ENCOUNTER — AMBULATORY - HEALTHEAST (OUTPATIENT)
Dept: LAB | Facility: CLINIC | Age: 52
End: 2017-12-07

## 2017-12-07 ENCOUNTER — OFFICE VISIT - HEALTHEAST (OUTPATIENT)
Dept: ADDICTION MEDICINE | Facility: CLINIC | Age: 52
End: 2017-12-07

## 2017-12-07 DIAGNOSIS — F15.20 AMPHETAMINE USE DISORDER, SEVERE (H): ICD-10-CM

## 2017-12-07 DIAGNOSIS — F12.20 CANNABIS USE DISORDER, MODERATE, DEPENDENCE (H): ICD-10-CM

## 2017-12-07 DIAGNOSIS — F11.20 OPIOID USE DISORDER, MODERATE, DEPENDENCE (H): ICD-10-CM

## 2017-12-08 ENCOUNTER — AMBULATORY - HEALTHEAST (OUTPATIENT)
Dept: ADDICTION MEDICINE | Facility: CLINIC | Age: 52
End: 2017-12-08

## 2017-12-12 ENCOUNTER — OFFICE VISIT - HEALTHEAST (OUTPATIENT)
Dept: ADDICTION MEDICINE | Facility: CLINIC | Age: 52
End: 2017-12-12

## 2017-12-12 DIAGNOSIS — F12.20 CANNABIS USE DISORDER, MODERATE, DEPENDENCE (H): ICD-10-CM

## 2017-12-12 DIAGNOSIS — F15.20 AMPHETAMINE USE DISORDER, SEVERE (H): ICD-10-CM

## 2017-12-14 ENCOUNTER — OFFICE VISIT - HEALTHEAST (OUTPATIENT)
Dept: ADDICTION MEDICINE | Facility: CLINIC | Age: 52
End: 2017-12-14

## 2017-12-14 DIAGNOSIS — F15.20 AMPHETAMINE USE DISORDER, SEVERE (H): ICD-10-CM

## 2017-12-14 DIAGNOSIS — F12.20 CANNABIS USE DISORDER, MODERATE, DEPENDENCE (H): ICD-10-CM

## 2017-12-18 ENCOUNTER — AMBULATORY - HEALTHEAST (OUTPATIENT)
Dept: ADDICTION MEDICINE | Facility: CLINIC | Age: 52
End: 2017-12-18

## 2017-12-19 ENCOUNTER — OFFICE VISIT - HEALTHEAST (OUTPATIENT)
Dept: ADDICTION MEDICINE | Facility: CLINIC | Age: 52
End: 2017-12-19

## 2017-12-19 DIAGNOSIS — F12.20 CANNABIS USE DISORDER, MODERATE, DEPENDENCE (H): ICD-10-CM

## 2017-12-19 DIAGNOSIS — F15.20 AMPHETAMINE USE DISORDER, SEVERE (H): ICD-10-CM

## 2017-12-21 ENCOUNTER — AMBULATORY - HEALTHEAST (OUTPATIENT)
Dept: ADDICTION MEDICINE | Facility: CLINIC | Age: 52
End: 2017-12-21

## 2017-12-22 ENCOUNTER — AMBULATORY - HEALTHEAST (OUTPATIENT)
Dept: ADDICTION MEDICINE | Facility: CLINIC | Age: 52
End: 2017-12-22

## 2017-12-26 ENCOUNTER — AMBULATORY - HEALTHEAST (OUTPATIENT)
Dept: ADDICTION MEDICINE | Facility: CLINIC | Age: 52
End: 2017-12-26

## 2017-12-28 ENCOUNTER — AMBULATORY - HEALTHEAST (OUTPATIENT)
Dept: ADDICTION MEDICINE | Facility: CLINIC | Age: 52
End: 2017-12-28

## 2017-12-28 ENCOUNTER — OFFICE VISIT - HEALTHEAST (OUTPATIENT)
Dept: INTERNAL MEDICINE | Facility: CLINIC | Age: 52
End: 2017-12-28

## 2017-12-28 DIAGNOSIS — G62.9 PERIPHERAL NERVE DISEASE: ICD-10-CM

## 2017-12-28 DIAGNOSIS — R41.3 MEMORY LOSS: ICD-10-CM

## 2017-12-28 DIAGNOSIS — F15.20 METHAMPHETAMINE USE DISORDER, SEVERE (H): ICD-10-CM

## 2017-12-28 DIAGNOSIS — M54.2 CHRONIC NECK PAIN: ICD-10-CM

## 2017-12-28 DIAGNOSIS — Z72.0 TOBACCO ABUSE: ICD-10-CM

## 2017-12-28 DIAGNOSIS — M19.90 DEGENERATIVE JOINT DISEASE: ICD-10-CM

## 2017-12-28 DIAGNOSIS — S06.9X9S TRAUMATIC BRAIN INJURY WITH LOSS OF CONSCIOUSNESS, SEQUELA (H): ICD-10-CM

## 2017-12-28 DIAGNOSIS — J44.9 CHRONIC OBSTRUCTIVE PULMONARY DISEASE, UNSPECIFIED COPD TYPE (H): ICD-10-CM

## 2017-12-28 DIAGNOSIS — R76.12 POSITIVE QUANTIFERON-TB GOLD TEST: ICD-10-CM

## 2017-12-28 DIAGNOSIS — M46.20 OSTEOMYELITIS OF SPINE (H): ICD-10-CM

## 2017-12-28 DIAGNOSIS — G89.29 CHRONIC NECK PAIN: ICD-10-CM

## 2017-12-28 ASSESSMENT — MIFFLIN-ST. JEOR: SCORE: 1481.49

## 2017-12-29 ENCOUNTER — AMBULATORY - HEALTHEAST (OUTPATIENT)
Dept: ADDICTION MEDICINE | Facility: CLINIC | Age: 52
End: 2017-12-29

## 2018-01-02 ENCOUNTER — OFFICE VISIT - HEALTHEAST (OUTPATIENT)
Dept: ADDICTION MEDICINE | Facility: CLINIC | Age: 53
End: 2018-01-02

## 2018-01-02 DIAGNOSIS — F12.20 CANNABIS USE DISORDER, MODERATE, DEPENDENCE (H): ICD-10-CM

## 2018-01-02 DIAGNOSIS — F15.20 AMPHETAMINE USE DISORDER, SEVERE (H): ICD-10-CM

## 2018-01-03 ENCOUNTER — COMMUNICATION - HEALTHEAST (OUTPATIENT)
Dept: INTERNAL MEDICINE | Facility: CLINIC | Age: 53
End: 2018-01-03

## 2018-01-03 ENCOUNTER — AMBULATORY - HEALTHEAST (OUTPATIENT)
Dept: NEUROLOGY | Facility: CLINIC | Age: 53
End: 2018-01-03

## 2018-01-04 ENCOUNTER — AMBULATORY - HEALTHEAST (OUTPATIENT)
Dept: ADDICTION MEDICINE | Facility: CLINIC | Age: 53
End: 2018-01-04

## 2018-01-06 ENCOUNTER — AMBULATORY - HEALTHEAST (OUTPATIENT)
Dept: ADDICTION MEDICINE | Facility: CLINIC | Age: 53
End: 2018-01-06

## 2018-01-08 ENCOUNTER — COMMUNICATION - HEALTHEAST (OUTPATIENT)
Dept: INTERNAL MEDICINE | Facility: CLINIC | Age: 53
End: 2018-01-08

## 2018-01-08 DIAGNOSIS — S06.9X9S TRAUMATIC BRAIN INJURY WITH LOSS OF CONSCIOUSNESS, SEQUELA (H): ICD-10-CM

## 2018-01-09 ENCOUNTER — AMBULATORY - HEALTHEAST (OUTPATIENT)
Dept: INTERNAL MEDICINE | Facility: CLINIC | Age: 53
End: 2018-01-09

## 2018-01-09 ENCOUNTER — OFFICE VISIT - HEALTHEAST (OUTPATIENT)
Dept: ADDICTION MEDICINE | Facility: CLINIC | Age: 53
End: 2018-01-09

## 2018-01-09 DIAGNOSIS — S06.9X9S TRAUMATIC BRAIN INJURY WITH LOSS OF CONSCIOUSNESS, SEQUELA (H): ICD-10-CM

## 2018-01-09 DIAGNOSIS — F12.20 CANNABIS USE DISORDER, MODERATE, DEPENDENCE (H): ICD-10-CM

## 2018-01-09 DIAGNOSIS — F15.20 AMPHETAMINE USE DISORDER, SEVERE (H): ICD-10-CM

## 2018-01-11 ENCOUNTER — AMBULATORY - HEALTHEAST (OUTPATIENT)
Dept: ADDICTION MEDICINE | Facility: CLINIC | Age: 53
End: 2018-01-11

## 2018-01-12 ENCOUNTER — AMBULATORY - HEALTHEAST (OUTPATIENT)
Dept: ADDICTION MEDICINE | Facility: CLINIC | Age: 53
End: 2018-01-12

## 2018-01-16 ENCOUNTER — AMBULATORY - HEALTHEAST (OUTPATIENT)
Dept: ADDICTION MEDICINE | Facility: CLINIC | Age: 53
End: 2018-01-16

## 2018-01-17 ENCOUNTER — AMBULATORY - HEALTHEAST (OUTPATIENT)
Dept: ADDICTION MEDICINE | Facility: CLINIC | Age: 53
End: 2018-01-17

## 2018-01-18 ENCOUNTER — AMBULATORY - HEALTHEAST (OUTPATIENT)
Dept: ADDICTION MEDICINE | Facility: CLINIC | Age: 53
End: 2018-01-18

## 2018-01-22 ENCOUNTER — AMBULATORY - HEALTHEAST (OUTPATIENT)
Dept: ADDICTION MEDICINE | Facility: CLINIC | Age: 53
End: 2018-01-22

## 2018-01-22 ENCOUNTER — COMMUNICATION - HEALTHEAST (OUTPATIENT)
Dept: ADDICTION MEDICINE | Facility: CLINIC | Age: 53
End: 2018-01-22

## 2018-01-23 ENCOUNTER — OFFICE VISIT - HEALTHEAST (OUTPATIENT)
Dept: ADDICTION MEDICINE | Facility: CLINIC | Age: 53
End: 2018-01-23

## 2018-01-23 ENCOUNTER — COMMUNICATION - HEALTHEAST (OUTPATIENT)
Dept: ADDICTION MEDICINE | Facility: CLINIC | Age: 53
End: 2018-01-23

## 2018-01-23 ENCOUNTER — AMBULATORY - HEALTHEAST (OUTPATIENT)
Dept: LAB | Facility: CLINIC | Age: 53
End: 2018-01-23

## 2018-01-23 DIAGNOSIS — F11.20 OPIOID USE DISORDER, MODERATE, DEPENDENCE (H): ICD-10-CM

## 2018-01-23 DIAGNOSIS — F12.20 CANNABIS USE DISORDER, MODERATE, DEPENDENCE (H): ICD-10-CM

## 2018-01-23 DIAGNOSIS — F15.20 AMPHETAMINE USE DISORDER, SEVERE (H): ICD-10-CM

## 2018-01-23 LAB
AMPHETAMINES UR QL SCN: NORMAL
BARBITURATES UR QL: NORMAL
BENZODIAZ UR QL: NORMAL
CANNABINOIDS UR QL SCN: NORMAL
COCAINE UR QL: NORMAL
CREAT UR-MCNC: 75.7 MG/DL
METHADONE UR QL SCN: NORMAL
OPIATES UR QL SCN: NORMAL
OXYCODONE UR QL: NORMAL
PCP UR QL SCN: NORMAL

## 2018-01-25 ENCOUNTER — AMBULATORY - HEALTHEAST (OUTPATIENT)
Dept: ADDICTION MEDICINE | Facility: CLINIC | Age: 53
End: 2018-01-25

## 2018-01-30 ENCOUNTER — AMBULATORY - HEALTHEAST (OUTPATIENT)
Dept: ADDICTION MEDICINE | Facility: CLINIC | Age: 53
End: 2018-01-30

## 2018-01-31 ENCOUNTER — AMBULATORY - HEALTHEAST (OUTPATIENT)
Dept: ADDICTION MEDICINE | Facility: CLINIC | Age: 53
End: 2018-01-31

## 2018-02-01 ENCOUNTER — AMBULATORY - HEALTHEAST (OUTPATIENT)
Dept: ADDICTION MEDICINE | Facility: CLINIC | Age: 53
End: 2018-02-01

## 2018-02-06 ENCOUNTER — AMBULATORY - HEALTHEAST (OUTPATIENT)
Dept: ADDICTION MEDICINE | Facility: CLINIC | Age: 53
End: 2018-02-06

## 2018-02-08 ENCOUNTER — AMBULATORY - HEALTHEAST (OUTPATIENT)
Dept: ADDICTION MEDICINE | Facility: CLINIC | Age: 53
End: 2018-02-08

## 2018-02-15 ENCOUNTER — AMBULATORY - HEALTHEAST (OUTPATIENT)
Dept: ADDICTION MEDICINE | Facility: CLINIC | Age: 53
End: 2018-02-15

## 2018-02-20 ENCOUNTER — AMBULATORY - HEALTHEAST (OUTPATIENT)
Dept: ADDICTION MEDICINE | Facility: CLINIC | Age: 53
End: 2018-02-20

## 2018-02-27 ENCOUNTER — AMBULATORY - HEALTHEAST (OUTPATIENT)
Dept: ADDICTION MEDICINE | Facility: CLINIC | Age: 53
End: 2018-02-27

## 2018-03-01 ENCOUNTER — OFFICE VISIT - HEALTHEAST (OUTPATIENT)
Dept: ADDICTION MEDICINE | Facility: CLINIC | Age: 53
End: 2018-03-01

## 2018-03-01 DIAGNOSIS — F15.20 AMPHETAMINE USE DISORDER, SEVERE (H): ICD-10-CM

## 2018-03-01 DIAGNOSIS — F12.20 CANNABIS USE DISORDER, MODERATE, DEPENDENCE (H): ICD-10-CM

## 2018-03-02 ENCOUNTER — AMBULATORY - HEALTHEAST (OUTPATIENT)
Dept: ADDICTION MEDICINE | Facility: CLINIC | Age: 53
End: 2018-03-02

## 2018-03-06 ENCOUNTER — AMBULATORY - HEALTHEAST (OUTPATIENT)
Dept: LAB | Facility: CLINIC | Age: 53
End: 2018-03-06

## 2018-03-06 ENCOUNTER — OFFICE VISIT - HEALTHEAST (OUTPATIENT)
Dept: ADDICTION MEDICINE | Facility: CLINIC | Age: 53
End: 2018-03-06

## 2018-03-06 DIAGNOSIS — F15.20 AMPHETAMINE USE DISORDER, SEVERE (H): ICD-10-CM

## 2018-03-06 DIAGNOSIS — F11.20 OPIOID USE DISORDER, MODERATE, DEPENDENCE (H): ICD-10-CM

## 2018-03-06 DIAGNOSIS — F12.20 CANNABIS USE DISORDER, MODERATE, DEPENDENCE (H): ICD-10-CM

## 2018-03-06 LAB
AMPHETAMINES UR QL SCN: NORMAL
BARBITURATES UR QL: NORMAL
BENZODIAZ UR QL: NORMAL
CANNABINOIDS UR QL SCN: NORMAL
COCAINE UR QL: NORMAL
CREAT UR-MCNC: 158.7 MG/DL
METHADONE UR QL SCN: NORMAL
OPIATES UR QL SCN: NORMAL
OXYCODONE UR QL: NORMAL
PCP UR QL SCN: NORMAL

## 2018-03-09 ENCOUNTER — AMBULATORY - HEALTHEAST (OUTPATIENT)
Dept: ADDICTION MEDICINE | Facility: CLINIC | Age: 53
End: 2018-03-09

## 2018-03-11 ENCOUNTER — AMBULATORY - HEALTHEAST (OUTPATIENT)
Dept: ADDICTION MEDICINE | Facility: CLINIC | Age: 53
End: 2018-03-11

## 2018-03-13 ENCOUNTER — AMBULATORY - HEALTHEAST (OUTPATIENT)
Dept: LAB | Facility: CLINIC | Age: 53
End: 2018-03-13

## 2018-03-13 ENCOUNTER — AMBULATORY - HEALTHEAST (OUTPATIENT)
Dept: ADDICTION MEDICINE | Facility: CLINIC | Age: 53
End: 2018-03-13

## 2018-03-13 DIAGNOSIS — F11.20 OPIOID USE DISORDER, MODERATE, DEPENDENCE (H): ICD-10-CM

## 2018-03-13 LAB
AMPHETAMINES UR QL SCN: NORMAL
BARBITURATES UR QL: NORMAL
BENZODIAZ UR QL: NORMAL
CANNABINOIDS UR QL SCN: NORMAL
COCAINE UR QL: NORMAL
CREAT UR-MCNC: 183 MG/DL
METHADONE UR QL SCN: NORMAL
OPIATES UR QL SCN: NORMAL
OXYCODONE UR QL: NORMAL
PCP UR QL SCN: NORMAL

## 2018-03-15 ENCOUNTER — AMBULATORY - HEALTHEAST (OUTPATIENT)
Dept: ADDICTION MEDICINE | Facility: CLINIC | Age: 53
End: 2018-03-15

## 2018-03-20 ENCOUNTER — AMBULATORY - HEALTHEAST (OUTPATIENT)
Dept: ADDICTION MEDICINE | Facility: CLINIC | Age: 53
End: 2018-03-20

## 2018-03-22 ENCOUNTER — COMMUNICATION - HEALTHEAST (OUTPATIENT)
Dept: ADDICTION MEDICINE | Facility: CLINIC | Age: 53
End: 2018-03-22

## 2018-03-29 ENCOUNTER — AMBULATORY - HEALTHEAST (OUTPATIENT)
Dept: ADDICTION MEDICINE | Facility: CLINIC | Age: 53
End: 2018-03-29

## 2018-04-12 ENCOUNTER — OFFICE VISIT - HEALTHEAST (OUTPATIENT)
Dept: ADDICTION MEDICINE | Facility: CLINIC | Age: 53
End: 2018-04-12

## 2018-04-12 ENCOUNTER — AMBULATORY - HEALTHEAST (OUTPATIENT)
Dept: LAB | Facility: CLINIC | Age: 53
End: 2018-04-12

## 2018-04-12 DIAGNOSIS — F15.20 AMPHETAMINE USE DISORDER, SEVERE (H): ICD-10-CM

## 2018-04-12 DIAGNOSIS — F12.20 CANNABIS USE DISORDER, MODERATE, DEPENDENCE (H): ICD-10-CM

## 2018-04-12 DIAGNOSIS — F11.20 OPIOID USE DISORDER, MODERATE, DEPENDENCE (H): ICD-10-CM

## 2018-04-12 LAB
AMPHETAMINES UR QL SCN: NORMAL
BARBITURATES UR QL: NORMAL
BENZODIAZ UR QL: NORMAL
CANNABINOIDS UR QL SCN: NORMAL
COCAINE UR QL: NORMAL
CREAT UR-MCNC: 127.6 MG/DL
METHADONE UR QL SCN: NORMAL
OPIATES UR QL SCN: NORMAL
OXYCODONE UR QL: NORMAL
PCP UR QL SCN: NORMAL

## 2018-04-15 ENCOUNTER — AMBULATORY - HEALTHEAST (OUTPATIENT)
Dept: ADDICTION MEDICINE | Facility: CLINIC | Age: 53
End: 2018-04-15

## 2018-04-17 ENCOUNTER — OFFICE VISIT - HEALTHEAST (OUTPATIENT)
Dept: ADDICTION MEDICINE | Facility: CLINIC | Age: 53
End: 2018-04-17

## 2018-04-17 ENCOUNTER — AMBULATORY - HEALTHEAST (OUTPATIENT)
Dept: ADDICTION MEDICINE | Facility: CLINIC | Age: 53
End: 2018-04-17

## 2018-04-17 DIAGNOSIS — F15.20 AMPHETAMINE USE DISORDER, SEVERE (H): ICD-10-CM

## 2018-04-17 DIAGNOSIS — F12.20 CANNABIS USE DISORDER, MODERATE, DEPENDENCE (H): ICD-10-CM

## 2018-04-19 ENCOUNTER — AMBULATORY - HEALTHEAST (OUTPATIENT)
Dept: ADDICTION MEDICINE | Facility: CLINIC | Age: 53
End: 2018-04-19

## 2018-04-20 ENCOUNTER — AMBULATORY - HEALTHEAST (OUTPATIENT)
Dept: ADDICTION MEDICINE | Facility: CLINIC | Age: 53
End: 2018-04-20

## 2018-04-27 ENCOUNTER — AMBULATORY - HEALTHEAST (OUTPATIENT)
Dept: ADDICTION MEDICINE | Facility: CLINIC | Age: 53
End: 2018-04-27

## 2018-05-01 ENCOUNTER — OFFICE VISIT - HEALTHEAST (OUTPATIENT)
Dept: ADDICTION MEDICINE | Facility: CLINIC | Age: 53
End: 2018-05-01

## 2018-05-01 ENCOUNTER — AMBULATORY - HEALTHEAST (OUTPATIENT)
Dept: LAB | Facility: CLINIC | Age: 53
End: 2018-05-01

## 2018-05-01 DIAGNOSIS — F12.20 CANNABIS USE DISORDER, MODERATE, DEPENDENCE (H): ICD-10-CM

## 2018-05-01 DIAGNOSIS — F11.20 OPIOID USE DISORDER, MODERATE, DEPENDENCE (H): ICD-10-CM

## 2018-05-01 DIAGNOSIS — F15.20 AMPHETAMINE USE DISORDER, SEVERE (H): ICD-10-CM

## 2018-05-01 LAB
AMPHETAMINES UR QL SCN: ABNORMAL
BARBITURATES UR QL: ABNORMAL
BENZODIAZ UR QL: ABNORMAL
CANNABINOIDS UR QL SCN: ABNORMAL
COCAINE UR QL: ABNORMAL
CREAT UR-MCNC: 193.6 MG/DL
METHADONE UR QL SCN: ABNORMAL
OPIATES UR QL SCN: ABNORMAL
OXYCODONE UR QL: ABNORMAL
PCP UR QL SCN: ABNORMAL

## 2018-05-03 ENCOUNTER — AMBULATORY - HEALTHEAST (OUTPATIENT)
Dept: ADDICTION MEDICINE | Facility: CLINIC | Age: 53
End: 2018-05-03

## 2018-05-04 ENCOUNTER — AMBULATORY - HEALTHEAST (OUTPATIENT)
Dept: ADDICTION MEDICINE | Facility: CLINIC | Age: 53
End: 2018-05-04

## 2018-05-08 ENCOUNTER — OFFICE VISIT - HEALTHEAST (OUTPATIENT)
Dept: ADDICTION MEDICINE | Facility: CLINIC | Age: 53
End: 2018-05-08

## 2018-05-08 DIAGNOSIS — F12.20 CANNABIS USE DISORDER, MODERATE, DEPENDENCE (H): ICD-10-CM

## 2018-05-08 DIAGNOSIS — F15.20 AMPHETAMINE USE DISORDER, SEVERE (H): ICD-10-CM

## 2018-05-11 ENCOUNTER — AMBULATORY - HEALTHEAST (OUTPATIENT)
Dept: ADDICTION MEDICINE | Facility: CLINIC | Age: 53
End: 2018-05-11

## 2018-05-17 ENCOUNTER — OFFICE VISIT - HEALTHEAST (OUTPATIENT)
Dept: ADDICTION MEDICINE | Facility: CLINIC | Age: 53
End: 2018-05-17

## 2018-05-17 DIAGNOSIS — F15.20 AMPHETAMINE USE DISORDER, SEVERE (H): ICD-10-CM

## 2018-05-17 DIAGNOSIS — F12.20 CANNABIS USE DISORDER, MODERATE, DEPENDENCE (H): ICD-10-CM

## 2018-05-18 ENCOUNTER — AMBULATORY - HEALTHEAST (OUTPATIENT)
Dept: ADDICTION MEDICINE | Facility: CLINIC | Age: 53
End: 2018-05-18

## 2018-05-24 ENCOUNTER — OFFICE VISIT - HEALTHEAST (OUTPATIENT)
Dept: ADDICTION MEDICINE | Facility: CLINIC | Age: 53
End: 2018-05-24

## 2018-05-24 DIAGNOSIS — F15.20 AMPHETAMINE USE DISORDER, SEVERE (H): ICD-10-CM

## 2018-05-24 DIAGNOSIS — F12.20 CANNABIS USE DISORDER, MODERATE, DEPENDENCE (H): ICD-10-CM

## 2018-05-25 ENCOUNTER — AMBULATORY - HEALTHEAST (OUTPATIENT)
Dept: ADDICTION MEDICINE | Facility: CLINIC | Age: 53
End: 2018-05-25

## 2018-05-31 ENCOUNTER — AMBULATORY - HEALTHEAST (OUTPATIENT)
Dept: ADDICTION MEDICINE | Facility: CLINIC | Age: 53
End: 2018-05-31

## 2018-06-05 ENCOUNTER — AMBULATORY - HEALTHEAST (OUTPATIENT)
Dept: ADDICTION MEDICINE | Facility: CLINIC | Age: 53
End: 2018-06-05

## 2018-06-12 ENCOUNTER — OFFICE VISIT - HEALTHEAST (OUTPATIENT)
Dept: ADDICTION MEDICINE | Facility: CLINIC | Age: 53
End: 2018-06-12

## 2018-06-12 DIAGNOSIS — F12.20 CANNABIS USE DISORDER, MODERATE, DEPENDENCE (H): ICD-10-CM

## 2018-06-12 DIAGNOSIS — F15.20 AMPHETAMINE USE DISORDER, SEVERE (H): ICD-10-CM

## 2018-06-15 ENCOUNTER — AMBULATORY - HEALTHEAST (OUTPATIENT)
Dept: ADDICTION MEDICINE | Facility: CLINIC | Age: 53
End: 2018-06-15

## 2018-06-19 ENCOUNTER — OFFICE VISIT - HEALTHEAST (OUTPATIENT)
Dept: ADDICTION MEDICINE | Facility: CLINIC | Age: 53
End: 2018-06-19

## 2018-06-19 DIAGNOSIS — F15.20 AMPHETAMINE USE DISORDER, SEVERE (H): ICD-10-CM

## 2018-06-19 DIAGNOSIS — F12.20 CANNABIS USE DISORDER, MODERATE, DEPENDENCE (H): ICD-10-CM

## 2018-06-21 ENCOUNTER — AMBULATORY - HEALTHEAST (OUTPATIENT)
Dept: ADDICTION MEDICINE | Facility: CLINIC | Age: 53
End: 2018-06-21

## 2018-06-21 ENCOUNTER — AMBULATORY - HEALTHEAST (OUTPATIENT)
Dept: LAB | Facility: CLINIC | Age: 53
End: 2018-06-21

## 2018-06-21 DIAGNOSIS — F11.20 OPIOID USE DISORDER, MODERATE, DEPENDENCE (H): ICD-10-CM

## 2018-06-21 LAB
AMPHETAMINES UR QL SCN: NORMAL
BARBITURATES UR QL: NORMAL
BENZODIAZ UR QL: NORMAL
CANNABINOIDS UR QL SCN: NORMAL
COCAINE UR QL: NORMAL
CREAT UR-MCNC: 99.7 MG/DL
METHADONE UR QL SCN: NORMAL
OPIATES UR QL SCN: NORMAL
OXYCODONE UR QL: NORMAL
PCP UR QL SCN: NORMAL

## 2018-06-22 ENCOUNTER — AMBULATORY - HEALTHEAST (OUTPATIENT)
Dept: ADDICTION MEDICINE | Facility: CLINIC | Age: 53
End: 2018-06-22

## 2018-06-26 ENCOUNTER — OFFICE VISIT - HEALTHEAST (OUTPATIENT)
Dept: ADDICTION MEDICINE | Facility: CLINIC | Age: 53
End: 2018-06-26

## 2018-06-26 ENCOUNTER — AMBULATORY - HEALTHEAST (OUTPATIENT)
Dept: LAB | Facility: CLINIC | Age: 53
End: 2018-06-26

## 2018-06-26 DIAGNOSIS — F11.20 OPIOID USE DISORDER, MODERATE, DEPENDENCE (H): ICD-10-CM

## 2018-06-26 DIAGNOSIS — F12.20 CANNABIS USE DISORDER, MODERATE, DEPENDENCE (H): ICD-10-CM

## 2018-06-26 DIAGNOSIS — F15.20 AMPHETAMINE USE DISORDER, SEVERE (H): ICD-10-CM

## 2018-06-26 LAB
AMPHETAMINES UR QL SCN: NORMAL
BARBITURATES UR QL: NORMAL
BENZODIAZ UR QL: NORMAL
CANNABINOIDS UR QL SCN: NORMAL
COCAINE UR QL: NORMAL
CREAT UR-MCNC: 19 MG/DL
METHADONE UR QL SCN: NORMAL
OPIATES UR QL SCN: NORMAL
OXYCODONE UR QL: NORMAL
PCP UR QL SCN: NORMAL

## 2018-06-29 ENCOUNTER — AMBULATORY - HEALTHEAST (OUTPATIENT)
Dept: ADDICTION MEDICINE | Facility: CLINIC | Age: 53
End: 2018-06-29

## 2018-07-03 ENCOUNTER — OFFICE VISIT - HEALTHEAST (OUTPATIENT)
Dept: ADDICTION MEDICINE | Facility: CLINIC | Age: 53
End: 2018-07-03

## 2018-07-03 DIAGNOSIS — F12.20 CANNABIS USE DISORDER, MODERATE, DEPENDENCE (H): ICD-10-CM

## 2018-07-03 DIAGNOSIS — F15.20 AMPHETAMINE USE DISORDER, SEVERE (H): ICD-10-CM

## 2018-07-05 ENCOUNTER — AMBULATORY - HEALTHEAST (OUTPATIENT)
Dept: ADDICTION MEDICINE | Facility: CLINIC | Age: 53
End: 2018-07-05

## 2018-07-06 ENCOUNTER — AMBULATORY - HEALTHEAST (OUTPATIENT)
Dept: ADDICTION MEDICINE | Facility: CLINIC | Age: 53
End: 2018-07-06

## 2018-07-10 ENCOUNTER — OFFICE VISIT - HEALTHEAST (OUTPATIENT)
Dept: ADDICTION MEDICINE | Facility: CLINIC | Age: 53
End: 2018-07-10

## 2018-07-10 DIAGNOSIS — F15.20 AMPHETAMINE USE DISORDER, SEVERE (H): ICD-10-CM

## 2018-07-12 ENCOUNTER — AMBULATORY - HEALTHEAST (OUTPATIENT)
Dept: ADDICTION MEDICINE | Facility: CLINIC | Age: 53
End: 2018-07-12

## 2018-07-13 ENCOUNTER — AMBULATORY - HEALTHEAST (OUTPATIENT)
Dept: ADDICTION MEDICINE | Facility: CLINIC | Age: 53
End: 2018-07-13

## 2018-07-17 ENCOUNTER — AMBULATORY - HEALTHEAST (OUTPATIENT)
Dept: ADDICTION MEDICINE | Facility: CLINIC | Age: 53
End: 2018-07-17

## 2018-07-19 ENCOUNTER — AMBULATORY - HEALTHEAST (OUTPATIENT)
Dept: ADDICTION MEDICINE | Facility: CLINIC | Age: 53
End: 2018-07-19

## 2018-07-20 ENCOUNTER — AMBULATORY - HEALTHEAST (OUTPATIENT)
Dept: ADDICTION MEDICINE | Facility: CLINIC | Age: 53
End: 2018-07-20

## 2018-07-24 ENCOUNTER — OFFICE VISIT - HEALTHEAST (OUTPATIENT)
Dept: ADDICTION MEDICINE | Facility: CLINIC | Age: 53
End: 2018-07-24

## 2018-07-24 ENCOUNTER — AMBULATORY - HEALTHEAST (OUTPATIENT)
Dept: ADDICTION MEDICINE | Facility: CLINIC | Age: 53
End: 2018-07-24

## 2018-07-24 DIAGNOSIS — F12.20 CANNABIS USE DISORDER, MODERATE, DEPENDENCE (H): ICD-10-CM

## 2018-07-24 DIAGNOSIS — F15.20 AMPHETAMINE USE DISORDER, SEVERE (H): ICD-10-CM

## 2018-07-26 ENCOUNTER — AMBULATORY - HEALTHEAST (OUTPATIENT)
Dept: ADDICTION MEDICINE | Facility: CLINIC | Age: 53
End: 2018-07-26

## 2018-07-27 ENCOUNTER — AMBULATORY - HEALTHEAST (OUTPATIENT)
Dept: ADDICTION MEDICINE | Facility: CLINIC | Age: 53
End: 2018-07-27

## 2018-07-31 ENCOUNTER — AMBULATORY - HEALTHEAST (OUTPATIENT)
Dept: ADDICTION MEDICINE | Facility: CLINIC | Age: 53
End: 2018-07-31

## 2018-08-02 ENCOUNTER — OFFICE VISIT - HEALTHEAST (OUTPATIENT)
Dept: ADDICTION MEDICINE | Facility: CLINIC | Age: 53
End: 2018-08-02

## 2018-08-02 ENCOUNTER — AMBULATORY - HEALTHEAST (OUTPATIENT)
Dept: LAB | Facility: CLINIC | Age: 53
End: 2018-08-02

## 2018-08-02 DIAGNOSIS — F15.20 AMPHETAMINE USE DISORDER, SEVERE (H): ICD-10-CM

## 2018-08-02 DIAGNOSIS — F12.20 CANNABIS USE DISORDER, MODERATE, DEPENDENCE (H): ICD-10-CM

## 2018-08-02 DIAGNOSIS — F11.20 OPIOID USE DISORDER, MODERATE, DEPENDENCE (H): ICD-10-CM

## 2018-08-02 LAB
AMPHETAMINES UR QL SCN: NORMAL
BARBITURATES UR QL: NORMAL
BENZODIAZ UR QL: NORMAL
CANNABINOIDS UR QL SCN: NORMAL
COCAINE UR QL: NORMAL
CREAT UR-MCNC: 66.7 MG/DL
METHADONE UR QL SCN: NORMAL
OPIATES UR QL SCN: NORMAL
OXYCODONE UR QL: NORMAL
PCP UR QL SCN: NORMAL

## 2018-08-03 ENCOUNTER — AMBULATORY - HEALTHEAST (OUTPATIENT)
Dept: ADDICTION MEDICINE | Facility: CLINIC | Age: 53
End: 2018-08-03

## 2018-08-10 ENCOUNTER — AMBULATORY - HEALTHEAST (OUTPATIENT)
Dept: ADDICTION MEDICINE | Facility: CLINIC | Age: 53
End: 2018-08-10

## 2018-08-19 ENCOUNTER — AMBULATORY - HEALTHEAST (OUTPATIENT)
Dept: ADDICTION MEDICINE | Facility: CLINIC | Age: 53
End: 2018-08-19

## 2018-08-23 ENCOUNTER — AMBULATORY - HEALTHEAST (OUTPATIENT)
Dept: ADDICTION MEDICINE | Facility: CLINIC | Age: 53
End: 2018-08-23

## 2018-08-24 ENCOUNTER — AMBULATORY - HEALTHEAST (OUTPATIENT)
Dept: ADDICTION MEDICINE | Facility: CLINIC | Age: 53
End: 2018-08-24

## 2018-08-28 ENCOUNTER — AMBULATORY - HEALTHEAST (OUTPATIENT)
Dept: ADDICTION MEDICINE | Facility: CLINIC | Age: 53
End: 2018-08-28

## 2018-08-29 ENCOUNTER — AMBULATORY - HEALTHEAST (OUTPATIENT)
Dept: ADDICTION MEDICINE | Facility: CLINIC | Age: 53
End: 2018-08-29

## 2018-08-30 ENCOUNTER — AMBULATORY - HEALTHEAST (OUTPATIENT)
Dept: ADDICTION MEDICINE | Facility: CLINIC | Age: 53
End: 2018-08-30

## 2018-08-31 ENCOUNTER — AMBULATORY - HEALTHEAST (OUTPATIENT)
Dept: ADDICTION MEDICINE | Facility: CLINIC | Age: 53
End: 2018-08-31

## 2018-09-06 ENCOUNTER — AMBULATORY - HEALTHEAST (OUTPATIENT)
Dept: ADDICTION MEDICINE | Facility: CLINIC | Age: 53
End: 2018-09-06

## 2018-09-07 ENCOUNTER — AMBULATORY - HEALTHEAST (OUTPATIENT)
Dept: ADDICTION MEDICINE | Facility: CLINIC | Age: 53
End: 2018-09-07

## 2018-09-14 ENCOUNTER — AMBULATORY - HEALTHEAST (OUTPATIENT)
Dept: ADDICTION MEDICINE | Facility: CLINIC | Age: 53
End: 2018-09-14

## 2020-12-30 ENCOUNTER — TRANSFERRED RECORDS (OUTPATIENT)
Dept: HEALTH INFORMATION MANAGEMENT | Facility: CLINIC | Age: 55
End: 2020-12-30

## 2020-12-30 ENCOUNTER — ANESTHESIA EVENT (OUTPATIENT)
Dept: SURGERY | Facility: CLINIC | Age: 55
DRG: 540 | End: 2020-12-30
Payer: MEDICARE

## 2020-12-30 ENCOUNTER — HOSPITAL ENCOUNTER (INPATIENT)
Facility: CLINIC | Age: 55
LOS: 10 days | Discharge: LEFT BEFORE DISCHARGE | DRG: 540 | End: 2021-01-09
Attending: EMERGENCY MEDICINE | Admitting: INTERNAL MEDICINE
Payer: MEDICARE

## 2020-12-30 ENCOUNTER — ANESTHESIA (OUTPATIENT)
Dept: SURGERY | Facility: CLINIC | Age: 55
DRG: 540 | End: 2020-12-30
Payer: MEDICARE

## 2020-12-30 DIAGNOSIS — Z20.828 EXPOSURE TO SARS-ASSOCIATED CORONAVIRUS: ICD-10-CM

## 2020-12-30 DIAGNOSIS — K04.7 PERIAPICAL ABSCESS WITHOUT SINUS: ICD-10-CM

## 2020-12-30 DIAGNOSIS — K12.2 ORAL ABSCESS: Primary | ICD-10-CM

## 2020-12-30 DIAGNOSIS — K12.2 LUDWIG'S ANGINA: ICD-10-CM

## 2020-12-30 DIAGNOSIS — F17.200 TOBACCO USE DISORDER: ICD-10-CM

## 2020-12-30 LAB
ANION GAP SERPL CALCULATED.3IONS-SCNC: 7 MMOL/L (ref 3–14)
BASOPHILS # BLD AUTO: 0.1 10E9/L (ref 0–0.2)
BASOPHILS NFR BLD AUTO: 0.5 %
BUN SERPL-MCNC: 11 MG/DL (ref 7–30)
CALCIUM SERPL-MCNC: 8.8 MG/DL (ref 8.5–10.1)
CHLORIDE SERPL-SCNC: 103 MMOL/L (ref 94–109)
CO2 SERPL-SCNC: 28 MMOL/L (ref 20–32)
CREAT SERPL-MCNC: 0.93 MG/DL (ref 0.66–1.25)
CRP SERPL-MCNC: 190 MG/L (ref 0–8)
DIFFERENTIAL METHOD BLD: ABNORMAL
EOSINOPHIL # BLD AUTO: 0.4 10E9/L (ref 0–0.7)
EOSINOPHIL NFR BLD AUTO: 2.7 %
ERYTHROCYTE [DISTWIDTH] IN BLOOD BY AUTOMATED COUNT: 14.2 % (ref 10–15)
ERYTHROCYTE [SEDIMENTATION RATE] IN BLOOD BY WESTERGREN METHOD: 59 MM/H (ref 0–20)
GFR SERPL CREATININE-BSD FRML MDRD: >90 ML/MIN/{1.73_M2}
GLUCOSE SERPL-MCNC: 91 MG/DL (ref 70–99)
HCT VFR BLD AUTO: 37.7 % (ref 40–53)
HGB BLD-MCNC: 12.1 G/DL (ref 13.3–17.7)
IMM GRANULOCYTES # BLD: 0.1 10E9/L (ref 0–0.4)
IMM GRANULOCYTES NFR BLD: 0.5 %
LABORATORY COMMENT REPORT: NORMAL
LYMPHOCYTES # BLD AUTO: 1.4 10E9/L (ref 0.8–5.3)
LYMPHOCYTES NFR BLD AUTO: 10.9 %
MCH RBC QN AUTO: 27 PG (ref 26.5–33)
MCHC RBC AUTO-ENTMCNC: 32.1 G/DL (ref 31.5–36.5)
MCV RBC AUTO: 84 FL (ref 78–100)
MONOCYTES # BLD AUTO: 1.5 10E9/L (ref 0–1.3)
MONOCYTES NFR BLD AUTO: 11.4 %
NEUTROPHILS # BLD AUTO: 9.8 10E9/L (ref 1.6–8.3)
NEUTROPHILS NFR BLD AUTO: 74 %
NRBC # BLD AUTO: 0 10*3/UL
NRBC BLD AUTO-RTO: 0 /100
PLATELET # BLD AUTO: 418 10E9/L (ref 150–450)
POTASSIUM SERPL-SCNC: 3.3 MMOL/L (ref 3.4–5.3)
RBC # BLD AUTO: 4.48 10E12/L (ref 4.4–5.9)
SARS-COV-2 RNA SPEC QL NAA+PROBE: NEGATIVE
SODIUM SERPL-SCNC: 138 MMOL/L (ref 133–144)
SPECIMEN SOURCE: NORMAL
WBC # BLD AUTO: 13.2 10E9/L (ref 4–11)

## 2020-12-30 PROCEDURE — 87075 CULTR BACTERIA EXCEPT BLOOD: CPT | Performed by: DENTIST

## 2020-12-30 PROCEDURE — 370N000001 HC ANESTHESIA TECHNICAL FEE, 1ST 30 MIN: Performed by: DENTIST

## 2020-12-30 PROCEDURE — 250N000011 HC RX IP 250 OP 636: Performed by: EMERGENCY MEDICINE

## 2020-12-30 PROCEDURE — 87076 CULTURE ANAEROBE IDENT EACH: CPT | Performed by: DENTIST

## 2020-12-30 PROCEDURE — 250N000013 HC RX MED GY IP 250 OP 250 PS 637: Performed by: NURSE ANESTHETIST, CERTIFIED REGISTERED

## 2020-12-30 PROCEDURE — 370N000002 HC ANESTHESIA TECHNICAL FEE, EACH ADDTL 15 MIN: Performed by: DENTIST

## 2020-12-30 PROCEDURE — U0003 INFECTIOUS AGENT DETECTION BY NUCLEIC ACID (DNA OR RNA); SEVERE ACUTE RESPIRATORY SYNDROME CORONAVIRUS 2 (SARS-COV-2) (CORONAVIRUS DISEASE [COVID-19]), AMPLIFIED PROBE TECHNIQUE, MAKING USE OF HIGH THROUGHPUT TECHNOLOGIES AS DESCRIBED BY CMS-2020-01-R: HCPCS | Performed by: EMERGENCY MEDICINE

## 2020-12-30 PROCEDURE — 258N000003 HC RX IP 258 OP 636: Performed by: ANESTHESIOLOGY

## 2020-12-30 PROCEDURE — 96365 THER/PROPH/DIAG IV INF INIT: CPT | Performed by: EMERGENCY MEDICINE

## 2020-12-30 PROCEDURE — 87102 FUNGUS ISOLATION CULTURE: CPT | Performed by: DENTIST

## 2020-12-30 PROCEDURE — 99285 EMERGENCY DEPT VISIT HI MDM: CPT | Mod: 25 | Performed by: EMERGENCY MEDICINE

## 2020-12-30 PROCEDURE — 99223 1ST HOSP IP/OBS HIGH 75: CPT | Mod: AI | Performed by: INTERNAL MEDICINE

## 2020-12-30 PROCEDURE — 250N000003 HC SEVOFLURANE, EA 15 MIN: Performed by: DENTIST

## 2020-12-30 PROCEDURE — 258N000003 HC RX IP 258 OP 636: Performed by: EMERGENCY MEDICINE

## 2020-12-30 PROCEDURE — 88307 TISSUE EXAM BY PATHOLOGIST: CPT | Mod: TC | Performed by: DENTIST

## 2020-12-30 PROCEDURE — 88311 DECALCIFY TISSUE: CPT | Mod: TC | Performed by: DENTIST

## 2020-12-30 PROCEDURE — 87070 CULTURE OTHR SPECIMN AEROBIC: CPT | Performed by: DENTIST

## 2020-12-30 PROCEDURE — 258N000003 HC RX IP 258 OP 636: Performed by: NURSE ANESTHETIST, CERTIFIED REGISTERED

## 2020-12-30 PROCEDURE — 761N000003 HC RECOVERY PHASE 1 LEVEL 2 FIRST HR: Performed by: DENTIST

## 2020-12-30 PROCEDURE — 360N000016 HC SURGERY LEVEL 2 1ST 30 MIN - UMMC: Performed by: DENTIST

## 2020-12-30 PROCEDURE — 86140 C-REACTIVE PROTEIN: CPT | Performed by: EMERGENCY MEDICINE

## 2020-12-30 PROCEDURE — 250N000011 HC RX IP 250 OP 636: Performed by: NURSE ANESTHETIST, CERTIFIED REGISTERED

## 2020-12-30 PROCEDURE — 87040 BLOOD CULTURE FOR BACTERIA: CPT | Performed by: EMERGENCY MEDICINE

## 2020-12-30 PROCEDURE — 87106 FUNGI IDENTIFICATION YEAST: CPT | Performed by: DENTIST

## 2020-12-30 PROCEDURE — 85652 RBC SED RATE AUTOMATED: CPT | Performed by: EMERGENCY MEDICINE

## 2020-12-30 PROCEDURE — 272N000001 HC OR GENERAL SUPPLY STERILE: Performed by: DENTIST

## 2020-12-30 PROCEDURE — 85025 COMPLETE CBC W/AUTO DIFF WBC: CPT | Performed by: EMERGENCY MEDICINE

## 2020-12-30 PROCEDURE — 360N000017 HC SURGERY LEVEL 2 EA 15 ADDTL MIN - UMMC: Performed by: DENTIST

## 2020-12-30 PROCEDURE — 250N000009 HC RX 250: Performed by: NURSE ANESTHETIST, CERTIFIED REGISTERED

## 2020-12-30 PROCEDURE — C9803 HOPD COVID-19 SPEC COLLECT: HCPCS | Performed by: EMERGENCY MEDICINE

## 2020-12-30 PROCEDURE — 80048 BASIC METABOLIC PNL TOTAL CA: CPT | Performed by: EMERGENCY MEDICINE

## 2020-12-30 PROCEDURE — 88307 TISSUE EXAM BY PATHOLOGIST: CPT | Mod: 26 | Performed by: PATHOLOGY

## 2020-12-30 PROCEDURE — 250N000009 HC RX 250: Performed by: DENTIST

## 2020-12-30 PROCEDURE — 99291 CRITICAL CARE FIRST HOUR: CPT | Performed by: EMERGENCY MEDICINE

## 2020-12-30 PROCEDURE — 96375 TX/PRO/DX INJ NEW DRUG ADDON: CPT | Performed by: EMERGENCY MEDICINE

## 2020-12-30 PROCEDURE — 87205 SMEAR GRAM STAIN: CPT | Performed by: DENTIST

## 2020-12-30 PROCEDURE — 88311 DECALCIFY TISSUE: CPT | Mod: 26 | Performed by: PATHOLOGY

## 2020-12-30 PROCEDURE — 250N000011 HC RX IP 250 OP 636: Performed by: ANESTHESIOLOGY

## 2020-12-30 PROCEDURE — 87077 CULTURE AEROBIC IDENTIFY: CPT | Performed by: DENTIST

## 2020-12-30 PROCEDURE — 120N000002 HC R&B MED SURG/OB UMMC

## 2020-12-30 RX ORDER — ONDANSETRON 4 MG/1
4 TABLET, ORALLY DISINTEGRATING ORAL EVERY 30 MIN PRN
Status: DISCONTINUED | OUTPATIENT
Start: 2020-12-30 | End: 2020-12-31 | Stop reason: HOSPADM

## 2020-12-30 RX ORDER — METOPROLOL TARTRATE 1 MG/ML
1-2 INJECTION, SOLUTION INTRAVENOUS EVERY 5 MIN PRN
Status: DISCONTINUED | OUTPATIENT
Start: 2020-12-30 | End: 2020-12-31 | Stop reason: HOSPADM

## 2020-12-30 RX ORDER — FENTANYL CITRATE 50 UG/ML
25-50 INJECTION, SOLUTION INTRAMUSCULAR; INTRAVENOUS
Status: DISCONTINUED | OUTPATIENT
Start: 2020-12-30 | End: 2020-12-31 | Stop reason: HOSPADM

## 2020-12-30 RX ORDER — BUPIVACAINE HYDROCHLORIDE AND EPINEPHRINE 2.5; 5 MG/ML; UG/ML
INJECTION, SOLUTION INFILTRATION; PERINEURAL PRN
Status: DISCONTINUED | OUTPATIENT
Start: 2020-12-30 | End: 2020-12-31 | Stop reason: HOSPADM

## 2020-12-30 RX ORDER — ONDANSETRON 2 MG/ML
4 INJECTION INTRAMUSCULAR; INTRAVENOUS EVERY 30 MIN PRN
Status: DISCONTINUED | OUTPATIENT
Start: 2020-12-30 | End: 2020-12-31 | Stop reason: HOSPADM

## 2020-12-30 RX ORDER — NALOXONE HYDROCHLORIDE 0.4 MG/ML
0.4 INJECTION, SOLUTION INTRAMUSCULAR; INTRAVENOUS; SUBCUTANEOUS
Status: ACTIVE | OUTPATIENT
Start: 2020-12-30 | End: 2020-12-31

## 2020-12-30 RX ORDER — ALBUTEROL SULFATE 0.83 MG/ML
2.5 SOLUTION RESPIRATORY (INHALATION) EVERY 4 HOURS PRN
Status: DISCONTINUED | OUTPATIENT
Start: 2020-12-30 | End: 2020-12-31 | Stop reason: HOSPADM

## 2020-12-30 RX ORDER — ALBUTEROL SULFATE 90 UG/1
AEROSOL, METERED RESPIRATORY (INHALATION) PRN
Status: DISCONTINUED | OUTPATIENT
Start: 2020-12-30 | End: 2020-12-30

## 2020-12-30 RX ORDER — AMPICILLIN AND SULBACTAM 2; 1 G/1; G/1
3 INJECTION, POWDER, FOR SOLUTION INTRAMUSCULAR; INTRAVENOUS EVERY 6 HOURS
Status: CANCELLED | OUTPATIENT
Start: 2020-12-30

## 2020-12-30 RX ORDER — HYDROMORPHONE HYDROCHLORIDE 1 MG/ML
.3-.5 INJECTION, SOLUTION INTRAMUSCULAR; INTRAVENOUS; SUBCUTANEOUS EVERY 5 MIN PRN
Status: DISCONTINUED | OUTPATIENT
Start: 2020-12-30 | End: 2020-12-31 | Stop reason: HOSPADM

## 2020-12-30 RX ORDER — SODIUM CHLORIDE, SODIUM LACTATE, POTASSIUM CHLORIDE, CALCIUM CHLORIDE 600; 310; 30; 20 MG/100ML; MG/100ML; MG/100ML; MG/100ML
INJECTION, SOLUTION INTRAVENOUS CONTINUOUS
Status: DISCONTINUED | OUTPATIENT
Start: 2020-12-31 | End: 2020-12-31 | Stop reason: HOSPADM

## 2020-12-30 RX ORDER — NALOXONE HYDROCHLORIDE 0.4 MG/ML
0.2 INJECTION, SOLUTION INTRAMUSCULAR; INTRAVENOUS; SUBCUTANEOUS
Status: ACTIVE | OUTPATIENT
Start: 2020-12-30 | End: 2020-12-31

## 2020-12-30 RX ORDER — DEXAMETHASONE SODIUM PHOSPHATE 4 MG/ML
INJECTION, SOLUTION INTRA-ARTICULAR; INTRALESIONAL; INTRAMUSCULAR; INTRAVENOUS; SOFT TISSUE PRN
Status: DISCONTINUED | OUTPATIENT
Start: 2020-12-30 | End: 2020-12-30

## 2020-12-30 RX ORDER — PROPOFOL 10 MG/ML
INJECTION, EMULSION INTRAVENOUS PRN
Status: DISCONTINUED | OUTPATIENT
Start: 2020-12-30 | End: 2020-12-30

## 2020-12-30 RX ORDER — FENTANYL CITRATE 50 UG/ML
25 INJECTION, SOLUTION INTRAMUSCULAR; INTRAVENOUS ONCE
Status: COMPLETED | OUTPATIENT
Start: 2020-12-30 | End: 2020-12-30

## 2020-12-30 RX ORDER — SODIUM CHLORIDE, SODIUM LACTATE, POTASSIUM CHLORIDE, CALCIUM CHLORIDE 600; 310; 30; 20 MG/100ML; MG/100ML; MG/100ML; MG/100ML
INJECTION, SOLUTION INTRAVENOUS CONTINUOUS PRN
Status: DISCONTINUED | OUTPATIENT
Start: 2020-12-30 | End: 2020-12-30

## 2020-12-30 RX ORDER — FENTANYL CITRATE 50 UG/ML
INJECTION, SOLUTION INTRAMUSCULAR; INTRAVENOUS PRN
Status: DISCONTINUED | OUTPATIENT
Start: 2020-12-30 | End: 2020-12-30

## 2020-12-30 RX ORDER — HYDRALAZINE HYDROCHLORIDE 20 MG/ML
2.5-5 INJECTION INTRAMUSCULAR; INTRAVENOUS EVERY 10 MIN PRN
Status: DISCONTINUED | OUTPATIENT
Start: 2020-12-30 | End: 2020-12-31 | Stop reason: HOSPADM

## 2020-12-30 RX ORDER — ONDANSETRON 2 MG/ML
INJECTION INTRAMUSCULAR; INTRAVENOUS PRN
Status: DISCONTINUED | OUTPATIENT
Start: 2020-12-30 | End: 2020-12-30

## 2020-12-30 RX ADMIN — SUGAMMADEX 200 MG: 100 INJECTION, SOLUTION INTRAVENOUS at 23:08

## 2020-12-30 RX ADMIN — FENTANYL CITRATE 50 MCG: 50 INJECTION, SOLUTION INTRAMUSCULAR; INTRAVENOUS at 22:28

## 2020-12-30 RX ADMIN — FENTANYL CITRATE 25 MCG: 50 INJECTION, SOLUTION INTRAMUSCULAR; INTRAVENOUS at 21:20

## 2020-12-30 RX ADMIN — SODIUM CHLORIDE, POTASSIUM CHLORIDE, SODIUM LACTATE AND CALCIUM CHLORIDE: 600; 310; 30; 20 INJECTION, SOLUTION INTRAVENOUS at 23:20

## 2020-12-30 RX ADMIN — FENTANYL CITRATE 50 MCG: 50 INJECTION, SOLUTION INTRAMUSCULAR; INTRAVENOUS at 22:19

## 2020-12-30 RX ADMIN — DEXMEDETOMIDINE HYDROCHLORIDE 8 MCG: 100 INJECTION, SOLUTION INTRAVENOUS at 23:07

## 2020-12-30 RX ADMIN — Medication 200 MG: at 22:06

## 2020-12-30 RX ADMIN — SODIUM CHLORIDE, POTASSIUM CHLORIDE, SODIUM LACTATE AND CALCIUM CHLORIDE: 600; 310; 30; 20 INJECTION, SOLUTION INTRAVENOUS at 21:55

## 2020-12-30 RX ADMIN — HYDROMORPHONE HYDROCHLORIDE 0.5 MG: 1 INJECTION, SOLUTION INTRAMUSCULAR; INTRAVENOUS; SUBCUTANEOUS at 23:58

## 2020-12-30 RX ADMIN — ROCURONIUM BROMIDE 40 MG: 10 INJECTION INTRAVENOUS at 22:15

## 2020-12-30 RX ADMIN — Medication 1500 MG: at 21:36

## 2020-12-30 RX ADMIN — MIDAZOLAM 2 MG: 1 INJECTION INTRAMUSCULAR; INTRAVENOUS at 22:19

## 2020-12-30 RX ADMIN — DEXAMETHASONE SODIUM PHOSPHATE 4 MG: 4 INJECTION, SOLUTION INTRA-ARTICULAR; INTRALESIONAL; INTRAMUSCULAR; INTRAVENOUS; SOFT TISSUE at 22:15

## 2020-12-30 RX ADMIN — PROPOFOL 150 MG: 10 INJECTION, EMULSION INTRAVENOUS at 22:06

## 2020-12-30 RX ADMIN — ALBUTEROL SULFATE 6 PUFF: 108 INHALANT RESPIRATORY (INHALATION) at 23:06

## 2020-12-30 RX ADMIN — ONDANSETRON 4 MG: 2 INJECTION INTRAMUSCULAR; INTRAVENOUS at 23:01

## 2020-12-30 ASSESSMENT — COPD QUESTIONNAIRES: COPD: 1

## 2020-12-30 ASSESSMENT — LIFESTYLE VARIABLES: TOBACCO_USE: 1

## 2020-12-30 ASSESSMENT — MIFFLIN-ST. JEOR: SCORE: 1589.22

## 2020-12-30 NOTE — LETTER
Health Information Management Services               Recipient:    [facility] facilities  Attn: Zeenat      Sender:    Tomer Ames [facility]  P: [phone]      Date: January 6, 2021  Patient Name:  Porfirio Clarke  Routing Message:      Please review for TCU. Pt would prefer [facility], but open to elsewhere.     Pt may be medically ready for discharge later today (will know by noon). Will discharge with 2 IV abx. Pt is homeless with hx of polysubstance abuse (no hx of IV drug use) and bipolar disorder.       The documents accompanying this notice contain confidential information belonging to the sender.  This information is intended only for the use of the individual or entity named above.  The authorized recipient of this information is prohibited from disclosing this information to any other party and is required to destroy the information after its stated need has been fulfilled, unless otherwise required by state law.      If you are not the intended recipient, you are hereby notified that any disclosure, copy, distribution or action taken in reliance on the contents of these documents is strictly prohibited.  If you have received this document in error, please return it by fax to [phone] with a note on the cover sheet explaining why you are returning it (e.g. not your patient, not your provider, etc.).  If you need further assistance, please call [facility] Centralized Transcription at [phone].  Documents may also be returned by mail to [facility] [employer], , [facility] [place] [address], [place], [place] [address].

## 2020-12-30 NOTE — LETTER
Health Information Management Services               Recipient:  [facility]  Fax: [phone]        Sender: Radha WELLINGTON MSJAMIE, LGSW                SaturdaySW                Pager: [phone]          Date: January 9, 2021  Patient Name:  Porfirio Clarke  Routing Message:  Looking for male bed. Please call RN Station directly for any nursing questions on [phone]          The documents accompanying this notice contain confidential information belonging to the sender.  This information is intended only for the use of the individual or entity named above.  The authorized recipient of this information is prohibited from disclosing this information to any other party and is required to destroy the information after its stated need has been fulfilled, unless otherwise required by state law.      If you are not the intended recipient, you are hereby notified that any disclosure, copy, distribution or action taken in reliance on the contents of these documents is strictly prohibited.  If you have received this document in error, please return it by fax to [phone] with a note on the cover sheet explaining why you are returning it (e.g. not your patient, not your provider, etc.).  If you need further assistance, please call [facility] Centralized Transcription at [phone].  Documents may also be returned by mail to [employer], , [address], [place], [place] [address].

## 2020-12-30 NOTE — LETTER
Health Information Management Services               Recipient:    [facility],   Attn: Adrian      Sender:    Tomer Ames [facility]  P: [phone]      Date: January 5, 2021  Patient Name:  Porfirio Clarke  Routing Message:      Please review for TCU. Pt anticipated to be medically ready for discharge tomorrow. Will discharge with 2 IV abx. Pt prefers [facility].       The documents accompanying this notice contain confidential information belonging to the sender.  This information is intended only for the use of the individual or entity named above.  The authorized recipient of this information is prohibited from disclosing this information to any other party and is required to destroy the information after its stated need has been fulfilled, unless otherwise required by state law.      If you are not the intended recipient, you are hereby notified that any disclosure, copy, distribution or action taken in reliance on the contents of these documents is strictly prohibited.  If you have received this document in error, please return it by fax to [phone] with a note on the cover sheet explaining why you are returning it (e.g. not your patient, not your provider, etc.).  If you need further assistance, please call [facility] Centralized Transcription at [phone].  Documents may also be returned by mail to [employer], , [address], [place], [place] [address].

## 2020-12-31 LAB
ANION GAP SERPL CALCULATED.3IONS-SCNC: 9 MMOL/L (ref 3–14)
BUN SERPL-MCNC: 11 MG/DL (ref 7–30)
CALCIUM SERPL-MCNC: 8.6 MG/DL (ref 8.5–10.1)
CHLORIDE SERPL-SCNC: 104 MMOL/L (ref 94–109)
CO2 SERPL-SCNC: 25 MMOL/L (ref 20–32)
CREAT SERPL-MCNC: 0.89 MG/DL (ref 0.66–1.25)
CRP SERPL-MCNC: 210 MG/L (ref 0–8)
ERYTHROCYTE [SEDIMENTATION RATE] IN BLOOD BY WESTERGREN METHOD: 62 MM/H (ref 0–20)
GFR SERPL CREATININE-BSD FRML MDRD: >90 ML/MIN/{1.73_M2}
GLUCOSE SERPL-MCNC: 115 MG/DL (ref 70–99)
GRAM STN SPEC: ABNORMAL
INR PPP: 1.3 (ref 0.86–1.14)
Lab: ABNORMAL
POTASSIUM SERPL-SCNC: 3.4 MMOL/L (ref 3.4–5.3)
SODIUM SERPL-SCNC: 137 MMOL/L (ref 133–144)
SPECIMEN SOURCE: ABNORMAL
SPECIMEN SOURCE: ABNORMAL

## 2020-12-31 PROCEDURE — 99222 1ST HOSP IP/OBS MODERATE 55: CPT | Performed by: STUDENT IN AN ORGANIZED HEALTH CARE EDUCATION/TRAINING PROGRAM

## 2020-12-31 PROCEDURE — 250N000013 HC RX MED GY IP 250 OP 250 PS 637: Performed by: STUDENT IN AN ORGANIZED HEALTH CARE EDUCATION/TRAINING PROGRAM

## 2020-12-31 PROCEDURE — 85610 PROTHROMBIN TIME: CPT | Performed by: STUDENT IN AN ORGANIZED HEALTH CARE EDUCATION/TRAINING PROGRAM

## 2020-12-31 PROCEDURE — 86140 C-REACTIVE PROTEIN: CPT | Performed by: STUDENT IN AN ORGANIZED HEALTH CARE EDUCATION/TRAINING PROGRAM

## 2020-12-31 PROCEDURE — 258N000003 HC RX IP 258 OP 636: Performed by: EMERGENCY MEDICINE

## 2020-12-31 PROCEDURE — 0W923ZX DRAINAGE OF FACE, PERCUTANEOUS APPROACH, DIAGNOSTIC: ICD-10-PCS | Performed by: DENTIST

## 2020-12-31 PROCEDURE — 85652 RBC SED RATE AUTOMATED: CPT | Performed by: STUDENT IN AN ORGANIZED HEALTH CARE EDUCATION/TRAINING PROGRAM

## 2020-12-31 PROCEDURE — 80048 BASIC METABOLIC PNL TOTAL CA: CPT | Performed by: STUDENT IN AN ORGANIZED HEALTH CARE EDUCATION/TRAINING PROGRAM

## 2020-12-31 PROCEDURE — 99233 SBSQ HOSP IP/OBS HIGH 50: CPT | Mod: GC | Performed by: HOSPITALIST

## 2020-12-31 PROCEDURE — 250N000011 HC RX IP 250 OP 636: Performed by: STUDENT IN AN ORGANIZED HEALTH CARE EDUCATION/TRAINING PROGRAM

## 2020-12-31 PROCEDURE — 0CDXXZ1 EXTRACTION OF LOWER TOOTH, MULTIPLE, EXTERNAL APPROACH: ICD-10-PCS | Performed by: DENTIST

## 2020-12-31 PROCEDURE — 120N000002 HC R&B MED SURG/OB UMMC

## 2020-12-31 PROCEDURE — 99221 1ST HOSP IP/OBS SF/LOW 40: CPT | Mod: GC | Performed by: OTOLARYNGOLOGY

## 2020-12-31 PROCEDURE — 36415 COLL VENOUS BLD VENIPUNCTURE: CPT | Performed by: STUDENT IN AN ORGANIZED HEALTH CARE EDUCATION/TRAINING PROGRAM

## 2020-12-31 PROCEDURE — 250N000011 HC RX IP 250 OP 636: Performed by: EMERGENCY MEDICINE

## 2020-12-31 PROCEDURE — 0NBV0ZX EXCISION OF LEFT MANDIBLE, OPEN APPROACH, DIAGNOSTIC: ICD-10-PCS | Performed by: DENTIST

## 2020-12-31 RX ORDER — ONDANSETRON 4 MG/1
4 TABLET, ORALLY DISINTEGRATING ORAL EVERY 6 HOURS PRN
Status: DISCONTINUED | OUTPATIENT
Start: 2020-12-31 | End: 2021-01-10 | Stop reason: HOSPADM

## 2020-12-31 RX ORDER — OXYCODONE HYDROCHLORIDE 5 MG/1
5 TABLET ORAL EVERY 4 HOURS PRN
Status: DISCONTINUED | OUTPATIENT
Start: 2020-12-31 | End: 2021-01-01

## 2020-12-31 RX ORDER — ALBUTEROL SULFATE 90 UG/1
2 AEROSOL, METERED RESPIRATORY (INHALATION) EVERY 6 HOURS PRN
Status: DISCONTINUED | OUTPATIENT
Start: 2020-12-31 | End: 2021-01-10 | Stop reason: HOSPADM

## 2020-12-31 RX ORDER — PIPERACILLIN SODIUM, TAZOBACTAM SODIUM 3; .375 G/15ML; G/15ML
3.38 INJECTION, POWDER, LYOPHILIZED, FOR SOLUTION INTRAVENOUS EVERY 6 HOURS
Status: DISCONTINUED | OUTPATIENT
Start: 2020-12-31 | End: 2021-01-04

## 2020-12-31 RX ORDER — AMOXICILLIN 250 MG
1 CAPSULE ORAL 2 TIMES DAILY
Status: DISCONTINUED | OUTPATIENT
Start: 2020-12-31 | End: 2021-01-10 | Stop reason: HOSPADM

## 2020-12-31 RX ORDER — AMOXICILLIN 250 MG
2 CAPSULE ORAL 2 TIMES DAILY
Status: DISCONTINUED | OUTPATIENT
Start: 2020-12-31 | End: 2021-01-10 | Stop reason: HOSPADM

## 2020-12-31 RX ORDER — LIDOCAINE 40 MG/G
CREAM TOPICAL
Status: DISCONTINUED | OUTPATIENT
Start: 2020-12-31 | End: 2021-01-10 | Stop reason: HOSPADM

## 2020-12-31 RX ORDER — ACETAMINOPHEN 325 MG/1
650 TABLET ORAL EVERY 4 HOURS PRN
Status: DISCONTINUED | OUTPATIENT
Start: 2020-12-31 | End: 2021-01-03

## 2020-12-31 RX ORDER — ONDANSETRON 2 MG/ML
4 INJECTION INTRAMUSCULAR; INTRAVENOUS EVERY 6 HOURS PRN
Status: DISCONTINUED | OUTPATIENT
Start: 2020-12-31 | End: 2021-01-10 | Stop reason: HOSPADM

## 2020-12-31 RX ADMIN — VANCOMYCIN HYDROCHLORIDE 1500 MG: 100 INJECTION, POWDER, LYOPHILIZED, FOR SOLUTION INTRAVENOUS at 22:00

## 2020-12-31 RX ADMIN — OXYCODONE HYDROCHLORIDE 5 MG: 5 TABLET ORAL at 13:24

## 2020-12-31 RX ADMIN — ACETAMINOPHEN 650 MG: 325 TABLET, FILM COATED ORAL at 19:30

## 2020-12-31 RX ADMIN — PIPERACILLIN AND TAZOBACTAM 3.38 G: 3; .375 INJECTION, POWDER, FOR SOLUTION INTRAVENOUS at 13:24

## 2020-12-31 RX ADMIN — PIPERACILLIN AND TAZOBACTAM 3.38 G: 3; .375 INJECTION, POWDER, FOR SOLUTION INTRAVENOUS at 19:32

## 2020-12-31 RX ADMIN — VANCOMYCIN HYDROCHLORIDE 1500 MG: 100 INJECTION, POWDER, LYOPHILIZED, FOR SOLUTION INTRAVENOUS at 09:43

## 2020-12-31 RX ADMIN — PIPERACILLIN AND TAZOBACTAM 3.38 G: 3; .375 INJECTION, POWDER, FOR SOLUTION INTRAVENOUS at 01:51

## 2020-12-31 RX ADMIN — OXYCODONE HYDROCHLORIDE 5 MG: 5 TABLET ORAL at 19:30

## 2020-12-31 RX ADMIN — PIPERACILLIN AND TAZOBACTAM 3.38 G: 3; .375 INJECTION, POWDER, FOR SOLUTION INTRAVENOUS at 07:32

## 2020-12-31 ASSESSMENT — ENCOUNTER SYMPTOMS
DYSURIA: 0
COUGH: 0
LIGHT-HEADEDNESS: 0
EYE PAIN: 0
SHORTNESS OF BREATH: 0
FACIAL SWELLING: 1
COLOR CHANGE: 0
POLYDIPSIA: 0
HEADACHES: 0
FREQUENCY: 0
NECK PAIN: 1
DIARRHEA: 0
PALPITATIONS: 0
TROUBLE SWALLOWING: 0
SORE THROAT: 0
BACK PAIN: 0
FEVER: 1
DIZZINESS: 0
NAUSEA: 0
WEAKNESS: 0
HEMATURIA: 0
ABDOMINAL PAIN: 0
CONSTIPATION: 0
VOMITING: 0
CHILLS: 0
BLOOD IN STOOL: 0
NUMBNESS: 0

## 2020-12-31 ASSESSMENT — ACTIVITIES OF DAILY LIVING (ADL)
ADLS_ACUITY_SCORE: 18
ADLS_ACUITY_SCORE: 18
ADLS_ACUITY_SCORE: 19
ADLS_ACUITY_SCORE: 18
ADLS_ACUITY_SCORE: 19

## 2020-12-31 ASSESSMENT — MIFFLIN-ST. JEOR: SCORE: 1594.75

## 2020-12-31 NOTE — OR NURSING
Pt to Pacu from the OR. Pt sleepy/groggy but awakens easily and is oriented x4. Pt follows commands. Vitals are WNL's.

## 2020-12-31 NOTE — H&P
[facility]     History and Physical - Marfred 3 Service        Date of Admission:  12/30/2020    Assessment & Plan   Porfirio Clarke is a 55 year old male with a PMHx of COPD, TBI, bipolar disorder, and polysubstance abuse who was admitted on 12/30/2020 as an emergent transfer from [facility] per OMFS for progressively worsening left-sided facial swelling and pain with CT Neck findings concerning for Manny's angina.     #Manny Angina   #Leukocytosis   #Left-sided Facial Swelling   Patient noticed left sided facial pain about a month ago which did not completely resolve after receiving penicillin from an outside ED. Patient then presented to [facility] ED on 12/30 for progressively worsening left sided facial swelling and pain. CT Neck obtained indicated multifocal periapical abscesses and findings consistent with Manny's angina. [facility] OMFS was contacted and patient was emergently transferred to [facility] for operative repair with incision and drainage of bilateral submandibular, sublingual and submental abscesses. Patient endorses subjective fevers, mild dysphagia, and odynophagia. Patient denies difficulty breathing or changes in voice. Patient with a leukocytosis of 13 on admission. He remains hemodynamically stable and afebrile.     - OMFS Following    > OR (12/20) for incision and drainage of bilateral submandibular, sublingual and  submental swelling, extraction of teeth as indicated, and biopsy of bilateral anterior  mandible   >Vanc and Zosyn for broad spectrum antimicrobial coverage. Patient listed Ceftriaxone as an allergy, however he was treated with Zosyn in ED and tolerated well.     >Trend daily ESR and CRP   -F/U Blood Cultures   -ID Consulted     ~Chronic Conditions~    -IP Pharmacy Consulted as no record of PTA medications       Diet:  NPO   Fluids: ---  DVT Prophylaxis: Pneumatic Compression Devices  Mckay Catheter: not present  Code Status:   full for now, unable to discuss as patient sedated          Disposition Plan   Expected discharge: 2 - 3 days, recommended to prior living arrangement once adequate pain management/ tolerating PO medications.  Entered: Mady Mcnulty MD 12/30/2020, 9:20 PM       The patient's care was discussed with the Attending Physician, Dr. Euceda.    Mady Mcnulty MD  [address]  Internal Medicine   [address]   Contact information available via [facility] Paging/Directory  Please see sign in/sign out for up to date coverage information  ______________________________________________________________________    Chief Complaint   Facial pain and swelling     History is obtained from the patient and electronic medical record.     History of Present Illness   Porfirio Clarke is a 55 year old male with a past medical history of COPD, TBI, bipolar disorder, and polysubstance abuse who was admitted on 12/30/2020 as a direct transfer from [facility] per [facility] for progressively worsening left-sided facial swelling and pain concerning for Manny's angina. Per chart review, it appears patient noticed left sided dental pain about month ago which did not completely resolve after receiving penicillin from an outside ED.     Patient then presented to [facility] ED for worsening left sided facial swelling. CT Neck obtained indicated multifocal periapical abscesses and findings consistent with Manny's angina. [facility] was contacted and patient was emergently transferred to [facility] for operative repair with incision and drainage of bilateral submandibular, sublingual and submental abscesses.     Patient endorses subjective fevers, mild dysphagia, and odynophagia. Patient denies difficulty breathing or chances in his voice.     Review of Systems    The 10 point Review of Systems is negative other than noted in the HPI or here.     Past Medical History    I have reviewed this patient's  "medical history and updated it with pertinent information if needed.   History reviewed. No pertinent past medical history.     Past Surgical History   I have reviewed this patient's surgical history and updated it with pertinent information if needed.  History reviewed. No pertinent surgical history.     Social History   I have reviewed this patient's social history and updated it with pertinent information if needed. Porfirio Clarke  reports that he has been smoking. He has smoked for the past 0.10 years. He has quit using smokeless tobacco. He reports current alcohol use. He reports current drug use. Drug: Marijuana.    Family History     No significant family history, including no history of:     Prior to Admission Medications   None     Allergies   Allergies   Allergen Reactions     Ceftriaxone Anaphylaxis     Per Care Everywhere, patient has tolerated penicillins previously.     Codeine Nausea and Vomiting and Other (See Comments)     Nauseous and itchiness (says \"it still works); PN Reaction Type: Allergy; PN Noted: 20060912  Nauseous and itchiness (says \"it still works); PN Reaction Type: Allergy; PN Noted: 20060912       Cephalexin Other (See Comments) and Rash     Skin slothing/peeling per pt. Has tolerated penicillins.  Skin slothing/peeling per pt. Has tolerated penicillins.         Physical Exam   Vital Signs: Temp: 99  F (37.2  C) Temp src: Axillary BP: (!) 164/102 Pulse: 104   Resp: 16 SpO2: 92 % O2 Device: None (Room air)    Weight: 180 lbs 0 oz    General: no acute distress, obese male   HEENT: dry mucus membranes, anicteric sclera, neck banding placed on anterior and lateral cervical region, anterior cervical edema noted on left   Lungs: No increased work of breathing, clear to auscultation, no wheezing or crackles  Cardiovascular: Regular rate, regular rhythm, normal S1/S2, no  lower extremity edema bilaterally   Abdomen: soft, nontender, nondistended, normoactive bowel sounds   Extremities: " intact strength and sensation in all extremities, moving all extremities spontaneously   Neuro: sleep in bed, oriented to person, place, time, cranial nerves grossly intact

## 2020-12-31 NOTE — CONSULTS
"Otolaryngology Consult Note  December 30, 2020      CC: Manny's angina    HPI: Porfirio Clarke is a 55 year old male with a past medical history of COPD, TBI, bipolar disorder, and polysubstance abuse transferred here from [facility] due to concern for progressive Manny's angina. Patient reports 3 - 4 days of dental pain with progressive left sided facial swelling and pain. He has associated dysphagia and some recent increased shortness of breath with chest tightness, but denies worsening today, and voice changes. He does report odynophagia and has not eaten since yesterday. He reports dental pain beginning a few weeks ago, was seen at another Emergency department and received antibiotics, but his pain resumed after completion of antibiotics. He does endorse polysubstance abuse including recent methamphetamine use and heroin to help alleviate his pain.     In the ED, patient afebrile, satting in low 90s. WBC 13.8. CT scan from the outside hospital demonstrated diffuse soft tissue swelling and edema, no abscess, elevation of the tongue, without narrowing of the airway.    PMHx:   History reviewed. No pertinent past medical history.  COPD  TBI  Bipolar    PSHx:   History reviewed. No pertinent surgical history.    Medications:  (Not in a hospital admission)    Allergies:      Allergies   Allergen Reactions     Ceftriaxone Anaphylaxis     Per Care Everywhere, patient has tolerated penicillins previously.     Codeine Nausea and Vomiting and Other (See Comments)     Nauseous and itchiness (says \"it still works); PN Reaction Type: Allergy; PN Noted: 20060912  Nauseous and itchiness (says \"it still works); PN Reaction Type: Allergy; PN Noted: 20060912       Cephalexin Other (See Comments) and Rash     Skin slothing/peeling per pt. Has tolerated penicillins.  Skin slothing/peeling per pt. Has tolerated penicillins.         Social Hx:  Endorses infrequent smoking, 1 pack per month. Occasional alcohol. Does report " "methamphetamine, marijuana, and heroin use.    Social History     Socioeconomic History     Marital status: Single     Spouse name: Not on file     Number of children: Not on file     Years of education: Not on file     Highest education level: Not on file   Occupational History     Not on file   Social Needs     Financial resource strain: Not on file     Food insecurity     Worry: Not on file     Inability: Not on file     Transportation needs     Medical: Not on file     Non-medical: Not on file   Tobacco Use     Smoking status: Current Every Day Smoker     Years: 0.10     Smokeless tobacco: Former User   Substance and Sexual Activity     Alcohol use: Yes     Comment: occasionally     Drug use: Yes     Types: Marijuana     Sexual activity: Not on file   Lifestyle     Physical activity     Days per week: Not on file     Minutes per session: Not on file     Stress: Not on file   Relationships     Social connections     Talks on phone: Not on file     Gets together: Not on file     Attends Muslim service: Not on file     Active member of club or organization: Not on file     Attends meetings of clubs or organizations: Not on file     Relationship status: Not on file     Intimate partner violence     Fear of current or ex partner: Not on file     Emotionally abused: Not on file     Physically abused: Not on file     Forced sexual activity: Not on file   Other Topics Concern     Not on file   Social History Narrative     Not on file       Family Hx:   History reviewed. No pertinent family history.    ROS: 12 point review of systems is negative unless noted in HPI.    PHYSICAL EXAM:  BP (!) 164/102   Pulse 104   Temp 99  F (37.2  C) (Axillary)   Resp 16   Ht 1.676 m (5' 6\")   Wt 81.6 kg (180 lb)   SpO2 92%   BMI 29.05 kg/m      General: laying in bed, no acute distress  HEAD: normocephalic  Face: symmetrical, CN VII intact bilaterally (HB 1). There is prominent swelling along the mandible extending to the " submental region with overlying erythema over the skin. Tissue is firm but there is no fluctuance palpated.  Eyes: EOMI without spontaneous or gaze evoked nystagmus, PERRL, clear sclera  Ears: no tragal tenderness, external ear canal open and clear bilaterally, TMs clear bilaterally  Nose: no anterior drainage  Mouth: moist, poor dentition with 4 cm interincisal opening. Mandibular teeth are tender to palpation and some teeth are loose. Floor of mouth firm and slightly elevated, right greater than left.  Oropharynx: Posterior oropharyngeal wall easily seen, uvula midline, no oropharyngeal erythema  Neck: no LAD, trachea midline  Neuro: cranial nerves 2-12 grossly intact  Respiratory: breathing non-labored on RA, saturations in low 90s, no stridor  Skin: no rashes or skin lesions of the face/neck    FIBEROPTIC ENDOSCOPY:  Due to concern for airway swelling, fiberoptic laryngoscopy was indicated. After obtaining verbal consent, the fiberoptic laryngoscope was passed under endoscopic vision through the right nasal passage. The turbinates were normal. The inferior and middle meati were clear bilaterally without purulence, masses, or polyps. The nasopharynx was clear. The eustachian tubes were clear. The soft palate appeared normal with good mobility. The epiglottis was sharp, and the visualized portion of the vallecula was clear. The larynx was clear with mobile cords. The arytenoids were clear, and there was no pooling in the hypopharynx.    ROUTINE IP LABS (Last four results)  WBC: 13.8    Imaging:  EXAM: CT SOFT TISSUE NECK W CONTRAST    LOCATION: [facility]    DATE/TIME: 12/30/2020 6:26 PM        INDICATION: Manny's angina    COMPARISON: None.    CONTRAST: Iohexol (Omni) 100 mL    TECHNIQUE: Routine CT Soft Tissue Neck with IV contrast. Multiplanar reformats. Dose reduction techniques were used.        FINDINGS:     VISUALIZED INTRACRANIAL/ORBITS/SINUSES: No abnormality of the visualized  intracranial compartment or orbits. Visualized paranasal sinuses and mastoid air cells are clear.        SUPRAHYOID NECK: Extensively carious dentition, most prominently affecting the mandibular dentition. There are are multiple periapical abscesses adjacent to the roots of the bilateral central and lateral incisors, bilateral canines, and bilateral first     premolar. There is extensive osseous destruction extending throughout the midline and parasymphyseal mandibular genu, with multifocal erosion through the anterior mandibular alveolar ridge. There is extensive soft tissue swelling and inflammatory change     throughout the perimandibular soft tissues, left greater than right, extending throughout the submental soft tissues. Mild inflammatory changes also extend into the lateral aspects of the floor of the mouth. No focal fluid collection to suggest abscess     formation. There is associated inflammatory thickening of the bilateral platysma muscles, left greater than right. Multiple enlarged, likely reactive lymph nodes throughout the submental and bilateral submandibular stations, as well as in the upper neck     bilaterally.        Additional multifocal carious maxillary dentition with small periapical abscesses adjacent to the left maxillary canine and first premolar. There is additional soft tissue swelling throughout the left premaxillary soft tissues and lateral face,     contiguous with the perimandibular soft tissue swelling.        Normal nasopharynx and oropharynx. Normal parapharyngeal and  spaces.         INFRAHYOID NECK: Normal supraglottic larynx, vocal cords, and hypopharynx.        SALIVARY GLANDS: Normal parotid and submandibular glands.        LYMPH NODES: No pathologic lymph nodes by size or morphology criteria.         VESSELS: Vascular structures of the neck are patent.        THYROID: Normal.         OTHER: No destructive osseous lesion. Mild to moderate biapical paraseptal  pulmonary emphysema, right greater than left.      Assessment and Plan  Porfirio Clarke is a 55 year old male with a past medical history of COPD and polysubstance abuse who presents today with Manny's angina, currently with a stable airway. There is swelling and bogginess of the floor of mouth but the patient is currently stable and denies acute changes in respiratory status. On flexible laryngoscopy his airway is clear, without swelling, and good mobility of the vocal cords. He is being evaluated by OMFS with plan to proceed to the OR this evening for exploration, extraction of indicated teeth, and possible bone biopsy given imaging changes consistent with osteomyelitis.    Recommendations:  - Recommend admission for close airway monitoring; please contact ENT if there is concern for progression of facial and floor of mouth swelling  - IV antibiotics to cover oral roc  - Admission to Medicine  - Source control with likely dental extraction perOMFS  - Please page on call ENT resident with questions or concerns regarding airway.    This patient and the plan were discussed with staff surgeon, Dr. Ayaka Tang.    Sarah Haas MD  Otolaryngology - Head & Neck Surgery PGY2  Please page the on-call resident with questions. If after hours, contact ENT with questions by dialing [phone] and entering job code [phone] when prompted.    I, Ayaka Tang MD, discussed this patient with the resident/fellow at the time of consultation. I have reviewed the note, labs, imaging and am in agreement with the assessment and plan. I did not see the patient.

## 2020-12-31 NOTE — ED TRIAGE NOTES
Pt. MARCELO from [facility] for L sided facial abscess. Hypertensive, OVSS on RA. EMS reports AVSS in route, no meds or treatments given, 20 g IV in R forearm SL. A & O x 4, independent, airway intact.

## 2020-12-31 NOTE — ANESTHESIA CARE TRANSFER NOTE
Patient: Porfirio Clarke    Procedure(s):  INCISION AND DRAINAGE, ABSCESS left SUBMANDIBULAR, SUBMENTAL AND SUBLINGUAL space, biopsy of anterior mandible, cultures, teeth extraction #20, #21, #22, #23, #24, #25, #26, #27, #28, #29    Diagnosis: Manny's angina [K12.2]  Diagnosis Additional Information: No value filed.    Anesthesia Type:   General     Note:  Airway :Face Mask  Patient transferred to:PACU  Comments:   -on 6L O2 via FM, Pt Spont.  breathing, awake & alert, monitors placed, VSS, RN at bedside       Vitals: (Last set prior to Anesthesia Care Transfer)    CRNA VITALS  12/30/2020 2245 - 12/30/2020 2320      12/30/2020             Pulse:  104    SpO2:  98 %    Resp Rate (observed):  (!) 1                Electronically Signed By: SHASHANK Kapoor CRNA  December 30, 2020  11:20 PM

## 2020-12-31 NOTE — H&P
Patient seen at bedside. No complaints regarding breathing, laying flat. I reviewed the resident Consult note and agree with the history, exam and assessment. I did not repeat laryngoscopy.     Exam: FOM swollen and tender, tongue is mobile. S/P I&D per OMFS.     A/P: Will sign off from ENT standpoint. Contact us if airway becomes a concern.

## 2020-12-31 NOTE — PLAN OF CARE
"Assumed Cares: [id number]. Pt was in PACU at the beginning of this shift.   Neuro: A&Ox4.  GI: No BM this shift.   : Due to void.   Respiratory: Denies SOB. On Capno. On 2L O2.   Cardiac: Denies chest pain.   Skin: Intact. No concerns.   Lines/Drains: Penrose drain open. R PIV TKO.   Mobility: Up with assist of 1.  Behavior: Calm/cooperative.   Pain: Reports pain in face.   Vital signs:  VSS on 2L O2.    /78   Pulse 89   Temp 98.8  F (37.1  C) (Axillary)   Resp 15   Ht 1.676 m (5' 6\")   Wt 82.2 kg (181 lb 3.5 oz)   SpO2 96%   BMI 29.25 kg/m       Events: Remains NPO.   Plan of care: Continue with current plan of care and update provider as needed.  Pt would like to talk to a / about resources.     " Jamari,RN

## 2020-12-31 NOTE — PHARMACY-VANCOMYCIN DOSING SERVICE
Pharmacy Vancomycin Initial Note  Date of Service 2020  Patient's  1965  55 year old, male    Indication: facial infection    Current estimated CrCl = CrCl cannot be calculated (No successful lab value found.).    Creatinine for last 3 days  No results found for requested labs within last 72 hours.    Recent Vancomycin Level(s) for last 3 days  No results found for requested labs within last 72 hours.      Vancomycin IV Administrations (past 72 hours)      No vancomycin orders with administrations in past 72 hours.                Nephrotoxins and other renal medications (From now, onward)    Start     Dose/Rate Route Frequency Ordered Stop    20 1000  vancomycin 1500 mg in 0.9% NaCl 250 ml intermittent infusion 1,500 mg      1,500 mg  over 90 Minutes Intravenous EVERY 12 HOURS 20  vancomycin 1500 mg in 0.9% NaCl 250 ml intermittent infusion 1,500 mg      1,500 mg  over 90 Minutes Intravenous ONCE 20            Contrast Orders - past 72 hours (72h ago, onward)    None                Plan:  1.  Start vancomycin  1500 mg IV q12h.   2.  Goal Trough Level: 10-15 mg/L   3.  Pharmacy will check trough levels as appropriate in 1-3 Days.    4. Serum creatinine levels will be ordered daily for the first week of therapy and at least twice weekly for subsequent weeks.    5. [place]ie method utilized to dose vancomycin therapy: Method 2    Mohinder Flor, PharmD, BCPS

## 2020-12-31 NOTE — CONSULTS
"ORAL & MAXILLOFACIAL SURGERY CONSULTATION   Name: Porfirio Clarke  MRN: [id number]  : 1965  Date of Service: 2020    OMFS consulted by Dr. Zuniga regarding left-sided facial swelling.    ASSESSMENT:  55 year old male with bilateral submandibular, submental, and sublingual swelling secondary to numerous odontogenic infections    RECOMMENDATIONS:  - Patient to go to OR tonight for incision and drainage of bilateral submandibular, sublingual and submental swelling, extraction of teeth as indicated, and biopsy of bilateral anterior mandible  - Continue broad spectrum antibiotics  - Admit to medicine for continued IV coverage  - Obtain ESR, CRP - trend daily  - NPO immediately       Please page OMFS Resident on-call with questions      The patient's case was discussed with Chief Resident, Dr. Tapia who discussed with staff surgeon, Dr. Villalpando.       CHIEF COMPLAINT:    \"I have a headache and a good-sized swelling on my left-side\"    HISTORY OF PRESENT ILLNESS:      Porfirio Clarke is a 55 year old gentleman with a pmh significant for COPD, substance abuse and bipolar disorder who has had recurrent swelling associated with numerous teeth for the past month. He states that he initially presented to an emergency department roughly 3 weeks ago and was prescribed penicillin, and the swelling subsided for a couple weeks. He then stated that 3 days ago, he noted recurring swelling in the same area. He then presented himself to the [facility] ED for evaluation of his left-sided facial swelling.   [facility] ED then contacted OMFS directly and requested an ED to ED transfer. We requested a CT of face/neck, which was obtained prior to transfer.  The patient was subsequently transferred to [facility] via ambulance and OMFS was made aware of his arrival. He currently denies dysphagia, dyspnea, odynphagia, n/f/v/c, but does endorse foul taste       PAST MEDICAL HISTORY:  COPD, substance abuse, bipolar " "disorder    PAST SURGICAL HISTORY:  Foreign body removed from forearm in 2007    PTA MEDICATIONS:  Denies               ALLERGIES:      Allergies   Allergen Reactions     Ceftriaxone Anaphylaxis     Per Care Everywhere, patient has tolerated penicillins previously.     Codeine Nausea and Vomiting and Other (See Comments)     Nauseous and itchiness (says \"it still works); PN Reaction Type: Allergy; PN Noted: 20060912  Nauseous and itchiness (says \"it still works); PN Reaction Type: Allergy; PN Noted: 20060912       Cephalexin Other (See Comments) and Rash     Skin slothing/peeling per pt. Has tolerated penicillins.  Skin slothing/peeling per pt. Has tolerated penicillins.          FAMILY HISTORY:    History reviewed. No pertinent family history.    SOCIAL HISTORY  Tobacco: 1 pack per week  Alcohol: Yes  Illicit drugs: History of IV drug abuse, marijuana abuse    REVIEW OF SYSTEMS:  Negative except for what is noted in HPI      OBJECTIVE/PHYSICAL EXAMINATION:  Vitals: Blood pressure (!) 164/102, pulse 104, temperature 99  F (37.2  C), temperature source Axillary, resp. rate 16, height 1.676 m (5' 6\"), weight 81.6 kg (180 lb), SpO2 92 %.  Constitutional: Laying down in bed, on his left-sided, comfortable, talkative  HEENT: Left-sided facial swelling extending roughly 15 mm past midline in submental region      Ears: External ears are normal      Eyes: Pupils equal round and reactive to light, Extraocular eye movement intact      Nose: External alae are normal, there is no hemorrhage, septum midline, no septal hematoma, no crepitus/deviation of the nasal dorsum      Mouth:   Exteremly poor state of dentition.  Bilateral mandibular buccal vestibular induration with bilateral mandibular buccal vestibule fistula tracts and active purulence. Floor of mouth is distended and tender. JEREMY: 40mm.  Tongue and uvula are midline and in anatomic position, no lateral pharyngeal swelling. Mucosal membranes are moist.   Neck: Soft, " supple, no lymphadenopathy.  Cardiovascular: No murmurs noted, RRR  Pulmonary: CTAB, equal chest rise bilaterally    Musculoskeletal: There are not signs of external trauma  Neurologic: AOx3,  EOMI, PERRL, facial sensation/movement symmetric (V2/V3), hearing intact to finger rub bilaterally, uvula midline, tongue midline on protrusion.    LABORATORY, PATHOLOGY, AND RADIOLOGY DATA:  Lab results:   CBC RESULTS: No results for input(s): WBC, RBC, HGB, HCT, MCV, MCH, MCHC, RDW, PLT in the last 39032 hours.    Last Basic Metabolic Panel:  No results found for: NA   No results found for: POTASSIUM  No results found for: CHLORIDE  No results found for: SILVERIO  No results found for: CO2  No results found for: BUN  No results found for: CR  No results found for: GLC         Imaging:  IMPRESSION:   1. Extensively carious mandibular dentition with multifocal periapical abscesses as described above. With this, there is extensive osseous destruction throughout the adjacent mandibular genu, likely representing osteomyelitis.  2. Extensive soft tissue swelling and inflammatory changes throughout the perimandibular and submental soft tissues, also extending into the left premaxillary soft tissues, left lateral face, and upper neck, with inflammatory thickening of the platysma   muscles, left greater than right. Multifocal enlarged, likely reactive lymph nodes throughout the submental and bilateral submandibular station, as well as in the upper neck bilaterally. 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  3. Mild to moderate biapical paraseptal pulmonary emphysema, right greater than left.    SIGNATURE:  Bonifacio Berrios DMD  OMFS Resident, PGY-2

## 2020-12-31 NOTE — CONSULTS
[facility] CONSULTATION     Patient:  Porfirio Clarke   Date of birth 1965, Medical record number [id number]  Date of Visit:  12/31/2020  Date of Admission: 12/30/2020  Consult Requester:Gaston Loya MD          Assessment and Recommendations:   ASSESSMENT:  1. 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 Aurelia's angina  3. Social: Polysubstance use, housing insecurity    DISCUSSION:   Porfirio Clarke is a 55 year old male with history significant for osteomyelitis and discitis of spine (11/2016), polysubstance abuse, bipolar 1 disorder, and housing insecurity who presented to [facility] ED with facial swelling on 12/30/20, transferred to [facility] on the same day after CT revealed mandibular osteomyelitis and changes consistent with Aurelia's angina. He was taken to OR on 12/30/20 by OMFS for extraction of 10 teeth, I&D of abscess, and biopsy of anterior mandible. Abscess fluid and tissue from mandible sent for culture- gram stains with GPCs from abscess and tissue and GBP resembling diphtheroids. Blood cultures also collected on arrival and NGDT. Likely oral roc causing infection. Would continue vancomycin and Zosyn while awaiting cultures, then anticipate narrowing as able.      RECOMMENDATION:  1. Continue Vancomycin  2. Continue Zosyn  3. Awaiting intra op cultures  4. Repeat blood cx for fever >100.4F    Thank you for this consult. ID will continue to follow.     Patient was discussed with Dr. Chatterjee.     Carmen Casas PA-C  Infectious Disease  Pager # [phone]  ______________________________________________________________    Consult Question: aurelia angina, concern for osteo.  Admission Diagnosis: Aurelia's angina [K12.2]  Oral abscess [K12.2]         History of Present Illness:     Porfirio Clarke is a 55 year old male with history significant for osteomyelitis and discitis of spine (11/2016), polysubstance abuse, bipolar 1 disorder, and housing insecurity who  presented to [facility] ED with facial swelling on 12/30/20, transferred to [facility] on the same day after CT revealed mandibular osteomyelitis and changes consistent with Manny's angina. He was taken to OR on 12/30/20 by OMFS for extraction of 10 teeth, I&D of abscess, and biopsy of anterior mandible.     Mr. Clarke began to have dental pain over a month ago. He was given a 10-day course of penicillin V by [facility] ED (11/29/20) and dental follow up was recommended. CT at that time with dental caries and multiple apical cysts, soft tissue swelling, and reactive lymphadenopathy. He was unable to get in to a dental clinic and up front cost was prohibitive. Dental pain returned within a couple of days of finishing course of penicillin, associated with loose lower teeth. He then developed swelling 3-4 days ago, which worsened 2 days ago. No difficulty with swallowing, speaking or breathing. Has felt wheezy. He also noted right calf swelling over the last couple of days, but this seems to have resolved. No pain or swelling at right ankle or knee.      Social Hx/Risk screening:   Previous positive [employer] (2012) with negative AF[employer] smears per chart reivew, repeat in 2016 was negative. Hx of incarceration and housing insecurity, currently living in a hotel and states that he has trouble finding places due to housing record and previous incarceration. Reports recent use of heroin and methamphetamine over the last couple of weeks. States that he strictly smokes and has not injected drugs since he had osteomyelitis, he also has arthritis in hands that makes injecting difficult. Uses smoking and chewing tobacco, has stopped chewing since he started having dental pain and has been smoking a couple of cigarettes every two days. Last HIV and Hepatitis screenings in 2016 per available Care Everywhere records.    Antimicrobials:  Current:  - Vancomycin (start 12/30)  - Zosyn (start 12/31)         Review of Systems:   CONSTITUTIONAL:   No fevers or chills  EYES: negative for icterus, vision change  ENT:  +dental pain and loose lower teeth, left facial swelling and neck swelling. Negative for nasal congestion, ear pain.  RESPIRATORY: +wheezing, tight breathing. negative for cough with sputum and dyspnea  CARDIOVASCULAR:  negative for chest pain, dyspnea  GASTROINTESTINAL:  negative for nausea, vomiting, diarrhea and constipation  GENITOURINARY:  negative for dysuria  MUSCULOSKELETAL:  +right leg swelling, now resolved, has been intermittent over 2 days. Negative for joint pain or swelling  INTEGUMENT:  +scabbed bed bug bites on left wrist (from previous hotel). Negative for rash and pruritus  NEURO:  +frontotemporal headaches. Negative for dizziness, focal weakness         Past Medical History:     Past Medical History:   Diagnosis Date     Bipolar 1 disorder (H)      Osteomyelitis of spine (H) 2017     TBI (traumatic brain injury) (H)             Past Surgical History:     Past Surgical History:   Procedure Laterality Date     INCISION AND DRAINAGE MANDIBLE, COMBINED N/A 12/30/2020    Procedure: INCISION AND DRAINAGE, ABSCESS left SUBMANDIBULAR, SUBMENTAL AND SUBLINGUAL space, biopsy of anterior mandible, cultures, teeth extraction #20, #21, #22, #23, #24, #25, #26, #27, #28, #29;  Surgeon: Sharon Villalpando DDS;  Location: [facility] OR            Family History:   Reviewed and non-contributory.   History reviewed. No pertinent family history.         Social History:     Social History     Tobacco Use     Smoking status: Current Every Day Smoker     Years: 0.10     Smokeless tobacco: Former User   Substance Use Topics     Alcohol use: Yes     Comment: occasionally     History   Sexual Activity     Sexual activity: Not on file            Current Medications:       piperacillin-tazobactam  3.375 g Intravenous Q6H     senna-docusate  1 tablet Oral BID    Or     senna-docusate  2 tablet Oral BID     sodium chloride (PF)  3 mL Intracatheter Q8H     vancomycin  "(VANCOCIN) IV  1,500 mg Intravenous Q12H            Allergies:     Allergies   Allergen Reactions     Ceftriaxone Anaphylaxis     Per Care Everywhere, patient has tolerated penicillins previously.     Codeine Nausea and Vomiting and Other (See Comments)     Nauseous and itchiness (says \"it still works); PN Reaction Type: Allergy; PN Noted: 20060912  Nauseous and itchiness (says \"it still works); PN Reaction Type: Allergy; PN Noted: 20060912       Cephalexin Other (See Comments) and Rash     Skin slothing/peeling per pt. Has tolerated penicillins.  Skin slothing/peeling per pt. Has tolerated penicillins.              Physical Exam:   Vitals were reviewed  Patient Vitals for the past 24 hrs:   BP Temp Temp src Pulse Resp SpO2 Height Weight   12/31/20 0512 115/78 98.8  F (37.1  C) Axillary 89 15 96 % -- --   12/31/20 0043 (!) 143/88 98.8  F (37.1  C) Axillary 94 16 94 % 1.676 m (5' 6\") 82.2 kg (181 lb 3.5 oz)   12/31/20 0015 (!) 146/87 99.5  F (37.5  C) -- 90 14 97 % -- --   12/31/20 0000 (!) 147/83 99.5  F (37.5  C) Core 96 12 96 % -- --   12/30/20 2345 (!) 143/93 99.5  F (37.5  C) -- 98 12 91 % -- --   12/30/20 2330 (!) 144/89 98.8  F (37.1  C) -- 104 14 96 % -- --   12/30/20 2320 (!) 145/90 99.1  F (37.3  C) Core 100 16 99 % -- --   12/30/20 2115 (!) 162/102 -- -- -- -- 97 % -- --   12/30/20 1929 (!) 164/102 99  F (37.2  C) Axillary 104 16 92 % 1.676 m (5' 6\") 81.6 kg (180 lb)       Physical Examination:  GENERAL:  Awake, alert, oriented and in NAD lying supine in bed.   HEENT:  Head is normocephalic, atraumatic   EYES:  Eyes have anicteric sclerae without conjunctival injection or stigmata of endocarditis.    ENT:  Oropharynx is moist. S/p dental extractions.   NECK:  Supple. +penrose drain x2 left and midline anterior neck.  LUNGS:  Diffuse expiratory wheezing with diminished bases bilaterally.   CARDIOVASCULAR:  RRR, +S1/S2, no murmur appreciated  ABDOMEN:  soft, nontender, nondistended, hypoactive bowel sounds. "   SKIN:  No acute rashes. Scabs on anterior/radial aspect of left wrist, no web space lesions visible. No stigmata of endocarditis.  EXTREMITIES: Warm, no mottling or edema. +firm, quarter sized swelling just below mid-shin on right leg with no overlying erythema, ecchymosis, or tenderness to palpation  NEUROLOGIC:  Grossly nonfocal. Active x4 extremities         Laboratory Data:     Inflammatory Markers    Recent Labs   Lab Test 12/31/20 0536 12/30/20 2112   SED 62* 59*   .0* 190.0*       Hematology Studies    Recent Labs   Lab Test 12/30/20 2112   WBC 13.2*   ANEU 9.8*   AEOS 0.4   HGB 12.1*   MCV 84          Metabolic Studies     Recent Labs   Lab Test 12/31/20 0536 12/30/20 2112    138   POTASSIUM 3.4 3.3*   CHLORIDE 104 103   CO2 25 28   BUN 11 11   CR 0.89 0.93   GFRESTIMATED >90 >90       Hepatic Studies  No lab results found.    Microbiology:  Culture Micro   Date Value Ref Range Status   12/30/2020 PENDING  Preliminary   12/30/2020 No growth after 8 hours  Preliminary   12/30/2020 No growth after 9 hours  Preliminary       Urine Studies  No lab results found.    Vancomycin Levels  No lab results found.    Invalid input(s): VANCO    Hepatitis B Testing No lab results found.  Hepatitis C Testing   No results found for: HCVAB, HQTG, HCGENO, HCPCR, HQTRNA, HEPRNA  Respiratory Virus Testing    No results found for: RS, FLUAG          Imaging:     CT soft tissue neck w/ contrast (12/30/20; via Care Everywhere)  1.  Extensively carious mandibular dentition with multifocal periapical abscesses as described above. With this, there is extensive osseous destruction throughout the adjacent mandibular genu, likely representing osteomyelitis.  2.  Extensive soft tissue swelling and inflammatory changes throughout the perimandibular and submental soft tissues, also extending into the left premaxillary soft tissues, left lateral face, and upper neck, with inflammatory thickening of the platysma    muscles, left greater than right. Multifocal enlarged, likely reactive lymph nodes throughout the submental and bilateral submandibular station, as well as in the upper neck bilaterally. 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  3.  Mild to moderate biapical paraseptal pulmonary emphysema, right greater than left.

## 2020-12-31 NOTE — ANESTHESIA POSTPROCEDURE EVALUATION
Anesthesia POST Procedure Evaluation    Patient: Porfirio Clarke   MRN:     [id number] Gender:   male   Age:    55 year old :      1965        Preoperative Diagnosis: Manny's angina [K12.2]   Procedure(s):  INCISION AND DRAINAGE, ABSCESS left SUBMANDIBULAR, SUBMENTAL AND SUBLINGUAL space, biopsy of anterior mandible, cultures, teeth extraction #20, #21, #22, #23, #24, #25, #26, #27, #28, #29   Postop Comments: No value filed.     Anesthesia Type: General       Disposition: Admission   Postop Pain Control: Uneventful            Sign Out: Well controlled pain   PONV: No   Neuro/Psych: Uneventful            Sign Out: Acceptable/Baseline neuro status   Airway/Respiratory: Uneventful            Sign Out: Acceptable/Baseline resp. status   CV/Hemodynamics: Uneventful            Sign Out: Acceptable CV status   Other NRE: NONE   DID A NON-ROUTINE EVENT OCCUR? No         Last Anesthesia Record Vitals:  CRNA VITALS  2020 2245 - 2020 2345      2020             EKG:  Sinus rhythm          Last PACU Vitals:  Vitals Value Taken Time   /83 20 0000   Temp 37.5  C (99.5  F) 20 0004   Pulse 93 20 0004   Resp 12 20 0000   SpO2 95 % 20 0004   Temp src     NIBP     Pulse     SpO2     Resp     Temp     Ht Rate     Temp 2     Vitals shown include unvalidated device data.      Electronically Signed By: Ryan Means MD, 2020, 12:05 AM

## 2020-12-31 NOTE — ANESTHESIA PROCEDURE NOTES
Airway   Date/Time: 12/30/2020 10:08 PM   Patient location during procedure: OR    Staff -   Anesthesiologist:  Ryan Means MD  CRNA: Melissa Akins APRN CRNA  Performed By: CRNA    Consent for Airway   Urgency: emergent    Indications and Patient Condition  Indications for airway management: keshawn-procedural  Induction type:RSIMask difficulty assessment: 0 - not attempted    Final Airway Details  Final airway type: endotracheal airway  Successful airway:ETT - single  Endotracheal Airway Details   ETT size (mm): 7.0  Cuffed: yes  Successful intubation technique: direct laryngoscopy  Grade View of Cords: 1  Adjucts: stylet  Measured from: gums/teeth  Secured at (cm): 23  Secured with: pink tape  Bite block used: None    Post intubation assessment   Placement verified by: capnometry, equal breath sounds and chest rise   Number of attempts at approach: 2 (CMAC 3 used initially - unable to see cords, swollen tissue)  Secured with:pink tape  Ease of procedure: easy  Dentition: Intact

## 2020-12-31 NOTE — PLAN OF CARE
Reason for admission: Post-op monitoring.   Admitted from: [facility] PACU  Report received from: Kyaw Appiah, RN  2 RN skin assessment completed by: Writer and CLIFTON Anne   Edema noted on R leg.   Bed Algorithm reevaluated: Yes  Was Pulsate ordered?: No  Belongings: Remain with patient. Medication sent to security.     Pt arrived to the unit around 0040. Pt oriented to unit, call light, and staff. Appears alert and oriented x4 and verbally responsive. Pt reported generalized pain in face. Patient up with assist of 1. Pt breathing comfortably on 3L O2.

## 2020-12-31 NOTE — PROGRESS NOTES
[facility]     Progress Note - Kim 4 Service        Date of Admission:  12/30/2020    Assessment & Plan       Porfirio Clarke is a 55 year old male with a PMHx of COPD, TBI, bipolar disorder, polysubstance abuse, and homelessness who was admitted on 12/30/2020 as an emergent transfer from [facility] per OMFS for progressively worsening left-sided facial swelling and pain with CT neck findings concerning for Manny's angina and mandibular osteomyelitis, s/p abscess drainage, teeth extraction, mandibular biopsy by OMFS on 12/30.     Manny Angina   Concern for mandibular osteomyelitis  Presented to [facility] ED on 12/30 for progressively worsening left sided facial swelling and pain with associated fevers, dysphagia, odynophagia. CT neck indicated multifocal periapical abscesses and findings consistent with Manny's angina. Emergently transferred to [facility] for operative repair with incision and drainage of bilateral submandibular, sublingual and submental abscesses by OMFS. Airway evaluated by ENT, so signs of airway compromise. Taken to OR on 12/30, successful drainage of multiple abscesses, teeth extraction, mandibular biopsy. Gram stain with GPCs.  - Follow cultures (abcess, blood)  - Continue vanc/zosyn for now until cultures result  - ID consulted for osteomyelitis   - OMFS following   - Pain control: APAP and oxy PRN, okay for additional pain meds if needed  - Head of bed 45 degrees  - Mechanical soft diet  - Warm compresses extraorally for comfort      Chronic Conditions PTA:   COPD  Bipolar disorder  Polysubstance abuse  TBI  - IP Pharmacy Consulted as no record of PTA medications        Diet: Mechanical/Dental Soft Diet  Room Service  Snacks/Supplements Adult: Boost Shake; With Meals    Fluids: None  Lines: PIV  DVT Prophylaxis: Pneumatic Compression Devices  Mckay Catheter: not present  Code Status: Full Code           Disposition Plan   Expected discharge:  4-7 days, recommended to likely TCU once antibiotic plan established.  Entered: Aftab Gastelum MD 12/31/2020, 2:34 PM     The patient's care was discussed with the Attending Physician, Dr. Loya, Patient and ID Consultant.    Aftab Gastelum MD  [address]   Please see sign in/sign out for up to date coverage information  ______________________________________________________________________    Interval History   Patient seen for follow up of Manny's angina, POD1 from OMFS surgery (abcess drainage, teeth extraction, mandibular biopsy, etc). No other acute events overnight. Having some tenderness/pain at incision site but overall pain well-controlled, breathing okay. No abdominal pain, nausea, vomiting, chest pain.     Data reviewed today: All imaging reviewed.    Physical Exam   Vital Signs: Temp: 99.2  F (37.3  C) Temp src: Oral BP: 104/62 Pulse: 95   Resp: 18 SpO2: 91 % O2 Device: Oxi Plus Oxygen Delivery: 3 LPM  Weight: 181 lbs 3.49 oz  General: Pt laying comfortably in bed, no acute distress  HEENT: neck incisions dressings C/D/I  Resp: Breathing comfortably on 3L  Cardiac: RRR  Abdomen: Soft, non-tender  Ext: warm and dry    Data   Recent Labs   Lab 12/31/20  0536 12/30/20  2112   WBC  --  13.2*   HGB  --  12.1*   MCV  --  84   PLT  --  418   INR 1.30*  --     138   POTASSIUM 3.4 3.3*   CHLORIDE 104 103   CO2 25 28   BUN 11 11   CR 0.89 0.93   ANIONGAP 9 7   SILVERIO 8.6 8.8   * 91     No results found for this or any previous visit (from the past 24 hour(s)).

## 2020-12-31 NOTE — PROGRESS NOTES
"ORAL & MAXILLOFACIAL SURGERY   PROGRESS NOTE  Porfirio Clarke,  MRN: [id number],  : 1965           ASSESSMENT:  55 year old male s/p I&D of bilateral facial abscess via transoral and transcutaneous approaches, extraction of teeth #20, 21, 22, 23, 24, 25, 26, 27, 28, and 29, and biopsy of anterior mandible, and aspiration biopsy of submental abscess/left sublingual space infection on 2020 progressing well.     Recommendations:   Recommendations:  - Continue broad spectrum antibiotic coverage  - Change fluffs as they become saturated  - Head of bed elevated 45 degrees  - Soft non chew diet  - Peridex BID  - Pain management per primary team  - Warm compress extraorally prn comfort, do not use ice  - New consults: Infectious disease for potential osteomyelitis    Please contact the OMFS resident on-call with questions or concerns.    Discussed with chief resident and staff.    Srinath Leavitt DDS  Oral & Maxillofacial Surgery, PGY-1    ____________________________________      SUBJECTIVE:  Patient pleasant during rounds this morning, endorses tenderness to surgical site.      PHYSICAL EXAM:   /78   Pulse 89   Temp 98.8  F (37.1  C) (Axillary)   Resp 15   Ht 1.676 m (5' 6\")   Wt 82.2 kg (181 lb 3.5 oz)   SpO2 96%   BMI 29.25 kg/m    GEN: WD/WN male, NAD  HEENT: Left-sided facial swelling extending roughly 15 mm past midline in submental region      Ears: External ears are normal      Eyes: Pupils equal round and reactive to light, Extraocular eye movement intact      Nose: External alae are normal, there is no hemorrhage, septum midline, no septal hematoma, no crepitus/deviation of the nasal dorsum      Mouth:   Extraction sites of remaining mandibular dentition has blood clots in place as well as mucofibrinous tissue which is consistent with healing, Bilateral mandibular buccal vestibules are tender to palpation, no purulence noted to be draining intraorally. Floor of mouth is mildly distended and " tender but improved from pre-surgical state. JEREMY: 30mm.  Tongue and uvula are midline and in anatomic position, no lateral pharyngeal swelling. Mucosal membranes are moist.   Neck: 2 penrose drains in neck (one on left submandibular region and one midline submental area), both with serosanguinous drainage saturating fluffs in neuronetting.   NEURO: AAOx4, CN II-XII intact bilaterally  PSYCH: Appropriate mood and affect     LABS:   CBC RESULTS:   Recent Labs   Lab Test 12/30/20 2112   WBC 13.2*   RBC 4.48   HGB 12.1*   HCT 37.7*   MCV 84   MCH 27.0   MCHC 32.1   RDW 14.2          RADIOLOGY:  None new

## 2020-12-31 NOTE — ED PROVIDER NOTES
[facility] EMERGENCY DEPARTMENT ([facility])  12/30/20  History     Chief Complaint   Patient presents with     Wound Infection     History was provided by the patient, EMS and medical records.        Porfirio Clarke is a 55 year old male with history of COPD, TBI, substance abuse and bipolar 1 disorder who presents to the [facility] ED via EMS from [facility] ED for evaluation of facial pain and swelling, possible Manny's Angina to see Oral Surgery/ENT.    Patient reported 3-4 days of left-sided facial swelling and pain which had been worsening over the past 2 days.  Today, he initially was concerned for difficulty breathing and swallowing but later was more bothered by his headache.  Patient also notes a subjective fever. He denies taking his prescribed medications for the past 2 months.  He admits to IV drug use 20 years ago and the occasional use of meth and alcohol.  Patient estimates smoking 1 pack of cigarettes per week.  He denies any recent dental procedures.  Labs were drawn and WBC found to be 13.8, hemoglobin 12.0, hematocrit 36.6.  CT of the soft tissue neck was conducted with impression pasted below.  Asymptomatic COVID-19 test was collected.  Patient was administered 2 mg Upton intravenously at 17:20 for pain management. 3.375 g IV zosyn administered at 18:30.  Patient airway remained intact but was thought to be at significant risk with significant submental and submandibular swelling and tenderness extending onto the anterior lateral neck.  Attending physician had high suspicion for Manny's angina.  Case was discussed with oral maxillofacial surgery at [facility] and recommended emergent transfer to [facility] ED for Specialist evaluation, possible operative intervention, admission.     Here, patient reports having some ongoing discomfort in the left face/neck.  He reports he feels as though he is able to swallow okay, able to tolerate his own secretions, and does not feel notably short of  breath.  Reports that his breathing status feels the same as his usual baseline in the setting of his COPD.  No coughs. No measured fevers, but has had a subjective fever as mentioned as these facial issues continued.  He reports that he still is able to open his mouth but that it is uncomfortable for him and getting increasing more difficult as he feels as though the swelling in the jaw and neck area is worsening.  Otherwise denies any chest pain, any breathing issues in the chest, no abdominal pain.  No nausea or vomiting.  No other rashes or skin changes.  Has discomfort in the left side and left anterior portion of the neck, but otherwise no neck discomfort or stiffness.  No particular headaches.  No vision or hearing changes.  No numbness, tingling or weakness.  No other new symptoms or complaints this time.  Please see ROS for further details.    This part of the document was transcribed by Alba Feliz Scribbreezy.    CT SOFT TISSUE NECK - [facility] (12/30/2020, 18:26)  Impression:  1.  Extensively carious mandibular dentition with multifocal periapical abscesses as described above. With this, there is extensive osseous destruction throughout the adjacent mandibular genu, likely representing osteomyelitis.     2.  Extensive soft tissue swelling and inflammatory changes throughout the perimandibular and submental soft tissues, also extending into the left premaxillary soft tissues, left lateral face, and upper neck, with inflammatory thickening of the platysma    muscles, left greater than right. Multifocal enlarged, likely reactive lymph nodes throughout the submental and bilateral submandibular station, as well as in the upper neck bilaterally. 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    3.  Mild to moderate biapical paraseptal pulmonary emphysema, right greater than left.     PMH:  COPD, TBI, substance abuse, bipolar 1 disorder    History reviewed. No pertinent surgical  "history.    History reviewed. No pertinent family history.    Social History     Tobacco Use     Smoking status: Current Every Day Smoker     Years: 0.10     Smokeless tobacco: Former User   Substance Use Topics     Alcohol use: Yes     Comment: occasionally     Current Facility-Administered Medications   Medication     acetaminophen (TYLENOL) tablet 650 mg     lidocaine (LMX4) cream     lidocaine 1 % 0.1-1 mL     melatonin tablet 1 mg     naloxone (NARCAN) injection 0.2 mg     naloxone (NARCAN) injection 0.2 mg     naloxone (NARCAN) injection 0.4 mg     naloxone (NARCAN) injection 0.4 mg     ondansetron (ZOFRAN-ODT) ODT tab 4 mg    Or     ondansetron (ZOFRAN) injection 4 mg     piperacillin-tazobactam (ZOSYN) 3.375 g vial to attach to  mL bag     senna-docusate (SENOKOT-S/PERICOLACE) 8.6-50 MG per tablet 1 tablet    Or     senna-docusate (SENOKOT-S/PERICOLACE) 8.6-50 MG per tablet 2 tablet     sodium chloride (PF) 0.9% PF flush 3 mL     sodium chloride (PF) 0.9% PF flush 3 mL     vancomycin 1500 mg in 0.9% NaCl 250 ml intermittent infusion 1,500 mg        Allergies   Allergen Reactions     Ceftriaxone Anaphylaxis     Per Care Everywhere, patient has tolerated penicillins previously.     Codeine Nausea and Vomiting and Other (See Comments)     Nauseous and itchiness (says \"it still works); PN Reaction Type: Allergy; PN Noted: 20060912  Nauseous and itchiness (says \"it still works); PN Reaction Type: Allergy; PN Noted: 20060912       Cephalexin Other (See Comments) and Rash     Skin slothing/peeling per pt. Has tolerated penicillins.  Skin slothing/peeling per pt. Has tolerated penicillins.           Review of Systems   Constitutional: Positive for fever (Subjective). Negative for chills.   HENT: Positive for facial swelling. Negative for sore throat and trouble swallowing.    Eyes: Negative for pain and visual disturbance.   Respiratory: Negative for cough and shortness of breath.    Cardiovascular: Negative " for chest pain, palpitations and leg swelling.   Gastrointestinal: Negative for abdominal pain, blood in stool, constipation, diarrhea, nausea and vomiting.   Endocrine: Negative for polydipsia and polyuria.   Genitourinary: Negative for dysuria, frequency, hematuria and urgency.   Musculoskeletal: Positive for neck pain (Left anterior). Negative for back pain.   Skin: Negative for color change and rash.   Allergic/Immunologic: Negative for immunocompromised state.   Neurological: Negative for dizziness, weakness, light-headedness, numbness and headaches.   All other systems reviewed and are negative.        Physical Exam      Physical Exam  CONSTITUTIONAL: Well-developed and well-nourished. Awake and alert. Non-toxic appearance. No acute distress.   HENT:   - Head: Normocephalic and atraumatic.   - Ears: Hearing and external ear grossly normal.   - Nose: Nose normal. No rhinorrhea. No epistaxis.   - Mouth/Throat: MMM  EYES: Conjunctivae and lids are normal. No scleral icterus.   NECK: Normal range of motion and phonation normal. Neck supple.  No tracheal deviation, no stridor. No edema or erythema noted.  CARDIOVASCULAR: Normal rate, regular rhythm and no appreciable abnormal heart sounds.  PULMONARY/CHEST: Normal work of breathing. No accessory muscle usage or stridor. No respiratory distress.  No appreciable abnormal breath sounds.  ABDOMEN: Soft, non-distended. No tenderness. No rigidity, rebound or guarding.   MUSCULOSKELETAL: Extremities warm and seemingly well perfused. No edema or calf tenderness.  NEUROLOGIC: Awake, alert. Not disoriented. She displays no atrophy and no tremor. Normal tone. No seizure activity. GCS 15  SKIN: Skin is warm and dry. No rash noted. No diaphoresis. No pallor.   PSYCHIATRIC: Normal mood and affect. Speech and behavior normal. Thought processes linear. Cognition and memory are normal.     ED Course     ED Course as of Dec 30 1945   Wed Dec 30, 2020   1935 Awaiting call back from  ENT      1943 Discussed w/ ENT, awaiting call back from Oral Surgery          CRITICAL CARE TIME:  Critical care time is 40 minutes, excluding teaching and procedures for identification/managment of manny's angina requiring emergent operative intervention, coordination with OMFS, ENT, IM, et al.     Assessments & Plan (with Medical Decision Making)   IMPRESSION: 55 year old male w/ PMH notable for COPD, TBI, substance abuse and bipolar 1 disorder who presents to the [facility] ED via EMS from [facility] ED for evaluation of facial pain and swelling, possible Manny's Angina to see Oral Surgery/ENT.    Clinically, patient appears nontoxic, NAD, but does have a notably swollen left side of his face, jaw and down towards the left side of his neck.  That said, he is tolerating his own secretions.  His voice is somewhat muffled from the swelling in his mouth area, not significant trismus.  No stridor.  No obvious cardiopulmonary findings.  Symptoms seem to be limited to the left face/neck area.    DDx includes, but not limited to, odontogenic infection, osteomyelitis, and a chief clinical concern here would be for progressive suspicion to a Ludewig's angina.  This is firm under the chin and on the left side of the neck, which made be think that there is some sort of infectious process going on here, but as mentioned thankfully appears to have an intact airway and no evidence of increased work of breathing or respiratory distress.    PLAN: Review imaging from OSH ED, STAT oral surgery and ENT consults, expand/broaden antibiotics (received Zosyn at outside facility, will order vancomycin here), change to Unasyn after when due for the next dose of antibiotics  - Risks/benefits of pursuing imaging reviewed and accepted.     RESULTS:  - Labs: WBC 13.2, mild hypokalemia 3.3 (returned after patient had left for OR),  COVID negative  - Imaging:  Images were reviewed, however no written report available as came from OSH ED and nothing  available yet in care everywhere.  Images also being reviewed by ENT and Oral surgery.    INTERVENTIONS:   - Oral surgery, ENT consults  - Vancomycin     RE-EVALUATION:  - Patient symptoms continued to be stable.  Continues to have that swelling, not particularly progressive noticed visually here in the ED, he continues to protect his airway here in the ED room   - Pt otherwise continues to do well here in the ED, no acute issues or apparent concerning changes in vitals or clinical appearance.    DISCUSSIONS:  - w/ Oral surgery and ENT: They have seen and evaluated patient here in the ED.  They will oral oral surgery will take to the operating room here directly from the ED, and request patient be admitted to IM thereafter.  Other than expanding laboratory studies including adding on inflammatory markers they have no additional requests or recommendations for here in the ED.  ENT has also evaluated the patient, they believe his airway to be patent though certainly he does have notable infection at this time, they defer the initial management to the Oral Surgery team. Surgical teams obtaining consent  - w/ IM: Reviewed patient/presentation, current state of workup/any pending studies. They will admit for further evaluation/management, F/U pending studies as needed, coordinate w/ consulting services as needed. No additional requests/recommendations for workup/management for in the ED at this time.   - w/ Patient: I have reviewed the available findings, plan with the patient. He expressed understanding and agreement with this plan. All questions answered to the best of our ability at this time.     DISPOSITION/PLANNING:  - IMPRESSION: Manny's angina, facial/neck infection  - DISPOSITION: Directly to OR, Admit to IM thereafter (level of care pending OR course)  - FOLLOW-UP:   - PENDING: Cultures, COVID test  - RECOMMENDATIONS: Continued ENT and oral surgery consults, IV ABX, consider ID consult as needed, CD  consult    This part of the document was transcribed by Alba Feliz Scribbreezy.  ______________________________________________________________________    --  Sonali Zuniga MD  [facility] EMERGENCY DEPARTMENT  12/30/2020     Sonali Zuniga MD  01/01/21 0434

## 2020-12-31 NOTE — ED NOTES
Bed: ED22  Expected date:   Expected time:   Means of arrival: Ambulance  Comments:  Porfirio Clarke  56 y/o male   Coming for Ludwigs Angina    RN Report:    Came in with painful, red swelling on left side of face extending into mandibular area  Afebrile, 92% on RA, placed on a couple liters of O2, no airway  compromise  A&O  20g R forearm   4 mg morphine, 4 mg zofran, will start zosyn before leaving  Will swab for COVID before transferring

## 2020-12-31 NOTE — SUMMARY OF CARE
Pt just got here at 5B. His belongings are randy, belt, wallet with $142 cash, 4 cell phone, 2 jacket, shoes, sock, black hand bag with knife, glasses, hat, and cell phone .

## 2020-12-31 NOTE — PLAN OF CARE
"/62 (BP Location: Left arm)   Pulse 95   Temp 99.2  F (37.3  C) (Oral)   Resp 18   Ht 1.676 m (5' 6\")   Wt 82.2 kg (181 lb 3.5 oz)   SpO2 91%   BMI 29.25 kg/m      0730 -1530  Reason for admission: Facial pain and edema. POD#0 Incision and drainage of bilateral facial abscess, Teeth removal and biopsy.  Neuro: A&Ox4.   Cardiac: SR. VSS.   Respiratory: Sating mid 90's  on 3 liter Oxi Plus.  GI/: Adequate urine output. No  bm.  Diet/appetite: Tolerating mechanical soft diet. Eating well.  Activity: Stand by assist.  Pain: At acceptable level on current regimen.   Skin: No new deficits noted.  LDA's:PIV tko.  Plan: Continue with POC. Notify primary team with changes.  "

## 2020-12-31 NOTE — OR NURSING
Pt stable and vitals WNL's. Pt reports pain in tongue but is tolerable after getting pain meds. Dr. Means at bedside assessing Pt. Pt transferred to .

## 2020-12-31 NOTE — ANESTHESIA PREPROCEDURE EVALUATION
"Anesthesia Pre-Procedure Evaluation    Patient: Porfirio Clarke   MRN:     [id number] Gender:   male   Age:    55 year old :      1965        Preoperative Diagnosis: Manny's angina [K12.2]   Procedure(s):  INCISION AND DRAINAGE, ABSCESS BILATEARL SUBMANDIBULAR, SUBMENTAL AND SUBLINGUAL     LABS:  CBC: No results found for: WBC, HGB, HCT, PLT  BMP: No results found for: NA, POTASSIUM, CHLORIDE, CO2, BUN, CR, GLC  COAGS: No results found for: PTT, INR, FIBR  POC: No results found for: BGM, HCG, HCGS  OTHER: No results found for: PH, LACT, A1C, SILVERIO, PHOS, MAG, ALBUMIN, PROTTOTAL, ALT, AST, GGT, ALKPHOS, BILITOTAL, BILIDIRECT, LIPASE, AMYLASE, JANNETH, TSH, T4, T3, CRP, SED     Preop Vitals    BP Readings from Last 3 Encounters:   20 (!) 164/102    Pulse Readings from Last 3 Encounters:   20 104      Resp Readings from Last 3 Encounters:   20 16    SpO2 Readings from Last 3 Encounters:   20 92%      Temp Readings from Last 1 Encounters:   20 37.2  C (99  F) (Axillary)    Ht Readings from Last 1 Encounters:   20 1.676 m (5' 6\")      Wt Readings from Last 1 Encounters:   20 81.6 kg (180 lb)    Estimated body mass index is 29.05 kg/m  as calculated from the following:    Height as of this encounter: 1.676 m (5' 6\").    Weight as of this encounter: 81.6 kg (180 lb).     LDA:        History reviewed. No pertinent past medical history.   History reviewed. No pertinent surgical history.   Allergies   Allergen Reactions     Ceftriaxone Anaphylaxis     Per Care Everywhere, patient has tolerated penicillins previously.     Codeine Nausea and Vomiting and Other (See Comments)     Nauseous and itchiness (says \"it still works); PN Reaction Type: Allergy; PN Noted: 2006  Nauseous and itchiness (says \"it still works); PN Reaction Type: Allergy; PN Noted: 2006       Cephalexin Other (See Comments) and Rash     Skin slothing/peeling per pt. Has tolerated penicillins.  Skin " slothing/peeling per pt. Has tolerated penicillins.          Anesthesia Evaluation     .             ROS/MED HX    ENT/Pulmonary: Comment: Discussed with ENT, submental swelling but airway open.  They feel video laryngoscope should allow visualization of the tracheal opening.    (+)other ENT- submental and sublingual abscess, tobacco use, COPD, , . .    Neurologic:       Cardiovascular:     (+) ----. : . . . :. . Previous cardiac testing Echodate:2018results:CONCLUSION:    Limited study.      Normal RV and LV size with normal LV function. EF 65%    Mildly dilated aortic root at 4 cm at sinus level.     Normal mitral function and anatomy.    Trileaflet aortic valve with no evidence of vegetation.     Normal IVC size and reactivity with normal RA mean pressure.      Left Ventricular Ejection Fraction: 65 %date: results: date: results: date: results:          METS/Exercise Tolerance:  3 - Able to walk 1-2 blocks without stopping   Hematologic: Comments: Elevated PTT and INR    (+) Anemia, -      Musculoskeletal:         GI/Hepatic:         Renal/Genitourinary:         Endo:         Psychiatric: Comment: IV drug abuse    (+) psychiatric history bipolar      Infectious Disease:   (+) Recent Fever, Other Infectious Disease submental abscess      Malignancy:         Other:                         PHYSICAL EXAM:   Mental Status/Neuro:    Airway: Facies: Challenging; Thick Neck  Mallampati: II  Mouth/Opening: Limited  TM distance: > 6 cm  Neck ROM: Limited   Respiratory: Auscultation: Wheezing     Resp. Rate: Normal     Resp. Effort: Normal      CV: Rhythm: Regular  Rate: Age appropriate  Heart: Normal Sounds  Edema: None   Comments: Very swollen submental area bilaterally, mild stridor                     Assessment:   ASA SCORE: 3 emergent   H&P: History and physical reviewed and following examination; no interval change.     Smoking Status:  Active Smoker       - patient smoked on day of surgery       - not instructed to  abstain from smoking on day of procedure       - Patient was NOT counseled regarding increased Infection Risk with same day smoking.   NPO Status: ELEVATED Aspiration Risk/Unknown     Plan:   Anes. Type:  General   Pre-Medication: None   Induction:  IV (RSI)   Airway: ETT; Oral; CMAC/VL   Access/Monitoring: PIV   Maintenance: Balanced     Postop Plan:   Postop Pain: Opioids  Postop Sedation/Airway: Not planned     PONV Management:   Adult Risk Factors:, Postop Opioids   Prevention: Ondansetron, Dexamethasone     CONSENT: Direct conversation   Plan and risks discussed with: Patient   Blood Products: Consented (ALL Blood Products)       Comments for Plan/Consent:  Plan:  GA with video laryngoscope, routine monitors.  Prefer oral intubation to avoid epistaxis    MD Ryan Garcia MD

## 2020-12-31 NOTE — BRIEF OP NOTE
[facility]     Brief Operative Note    Pre-operative diagnosis: Manny's angina [K12.2]  Post-operative diagnosis Same as pre-operative diagnosis    Procedure: Procedure(s):  INCISION AND DRAINAGE, ABSCESS left SUBMANDIBULAR, SUBMENTAL AND SUBLINGUAL space, biopsy of anterior mandible, cultures, teeth extraction #20, #21, #22, #23, #24, #25, #26, #27, #28, #29  Surgeon: Surgeon(s) and Role:     * Sharon Villalpando DDS - Primary     * Bonifacio Berrios DMD - Resident - Assisting  Anesthesia: General   Estimated blood loss: 150ml  Drains: Extra-oral submental and left extraoral submandibular penrose drains  Specimens:   ID Type Source Tests Collected by Time Destination   1 : Left submandibular abscess Abscess Other ABSCESS CULTURE AEROBIC BACTERIAL, ANAEROBIC BACTERIAL CULTURE, FUNGUS CULTURE, GRAM STAIN Sharon Villalpando DDS 12/30/2020 10:28 PM    2 : Anterior mandible Tissue Mandible ANAEROBIC BACTERIAL CULTURE, FUNGUS CULTURE, GRAM STAIN, TISSUE CULTURE AEROBIC BACTERIAL Sharon Villalpando DDS 12/30/2020 10:45 PM    A : Anterior mandible Tissue Mandible SURGICAL PATHOLOGY EXAM Sharon Villalpando DDS 12/30/2020 10:42 PM      Findings:   Evidence of infection: deep space infection and abscess. Gross caries of remaining mandibular dentition. Possible osteomyelitis  Complications: None.  Local anesthesia: 10 ml 0.5% marcaine w/ epi      Recommendations:  - Continue broad spectrum antibiotic coverage  - Change fluffs as they become saturated  - Head of bed elevated 45 degrees  - Soft non chew diet  - Pain management per primary team  - Warm compress extraorally prn comfort, do not use ice  - ID consult in AM      Bonifacio Berrios DMD  OM Resident, PGY-2

## 2020-12-31 NOTE — PROGRESS NOTES
"Otolaryngology Progress Note  December 31, 2020    SUBJECTIVE: Last night the patient was taken to the OR for I&D of multiple abscesses and biopsy of mandible. He was extubated following the surgery and has been satting 96%+ on 3L O2 via facemask. Vitals within normal limits.      OBJECTIVE:   /78   Pulse 89   Temp 98.8  F (37.1  C) (Axillary)   Resp 15   Ht 1.676 m (5' 6\")   Wt 82.2 kg (181 lb 3.5 oz)   SpO2 96%   BMI 29.25 kg/m     General: Pt sleeping comfortably in bed, lying on his back.    HEENT: neck tender, edematous but soft, incisions c/d/i with dressings in place. Floor of mouth slightly edematous but tongue movement intact, no obstruction.    Pulmonary: Breathing non-labored while lying on back with 3L, no stridor, no accessory muscle use.      Intake/Output Summary (Last 24 hours) at 12/31/2020 0856  Last data filed at 12/31/2020 0059  Gross per 24 hour   Intake 1165 ml   Output 150 ml   Net 1015 ml     LABS:  ROUTINE IP LABS (Last four results)  BMP  Recent Labs   Lab 12/31/20  0536 12/30/20 2112    138   POTASSIUM 3.4 3.3*   CHLORIDE 104 103   SILVERIO 8.6 8.8   CO2 25 28   BUN 11 11   CR 0.89 0.93   * 91     CBC  Recent Labs   Lab 12/30/20 2112   WBC 13.2*   RBC 4.48   HGB 12.1*   HCT 37.7*   MCV 84   MCH 27.0   MCHC 32.1   RDW 14.2        INR  Recent Labs   Lab 12/31/20  0536   INR 1.30*      yesterday 190, 210 today. ESR 62  Gram stain of L submandibular cultures show GPCs.     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 Clyde Henderson MD  Otolaryngology-Head & Neck Surgery, PGY-1  Please contact ENT with questions by dialing [phone] and entering job code [phone] when prompted.    I saw and evaluated the patient. I agree with the findings and the plan of care as documented in the resident s note.  Ayaka Tang MD

## 2020-12-31 NOTE — PROGRESS NOTES
CLINICAL NUTRITION SERVICES - ASSESSMENT NOTE     Nutrition Prescription    RECOMMENDATIONS FOR MDs/PROVIDERS TO ORDER:  Encourage PO intake     Malnutrition Status:    Non-severe malnutrition in the context of acute illness and environmental factors    Recommendations already ordered by Registered Dietitian (RD):  - Nutrition Education: Reviewed nutrition goals and roll of RD. Provided Mechanical/Dental soft menu and reviewed options with Porfirio.   - Send Boost Shake with breakfast and dinner (variety of flavors)  - Room service with assist per pt request with h/o TBI and difficulty keeping track of time      Future/Additional Recommendations:  - Continue to monitor PO intake and wt trends  - Adjust supplement regimen as desired by the pt     REASON FOR ASSESSMENT  Porfirio Clarke is a/an 55 year old male assessed by the dietitian for Admission Nutrition Risk Screen for positive    Per Chart - PMHx of COPD, TBI, bipolar disorder, and polysubstance abuse who was admitted on 12/30/2020 as an emergent transfer from [facility] per OMFS for progressively worsening left-sided facial swelling and pain with CT Neck findings concerning for Manny's angina.    NUTRITION HISTORY  Per RN note from 12/31: States the weight loss is more due to circumstances and homelessness.     Pt reports that he doesn't usually eat structured meals but rather eats several snacks throughout the day. He is intermittently homeless but denies food insecurity as he states he can always got to the store and get shelf stable food or meals/snacks that are ready to eat. He usually only eats softer food as he has struggled with poor dentition for the last several months. Last BM 12/30, the AM before admission.         CURRENT NUTRITION ORDERS  Diet: Mechanical/Dental Soft   Intake/Tolerance: Pt reports having a good appetite. He requests help ordering meals has his TID makes it difficult for him to keep track of time. He is not deterred by his modified  "diet as it is what he followed before admission d/t poor dentition. Porfirio would also be interested in Boost shakes in a variety of flavors.      LABS  Labs reviewed    MEDICATIONS  Medications reviewed  Scheduled senna-docusate     ANTHROPOMETRICS  Height: 167.6 cm (5' 6\")  Most Recent Weight: 82.2 kg (181 lb 3.5 oz)    IBW: 64.5 kg (127%)  BMI: 29.3 kg/m2 Overweight BMI 25-29.9  Weight History: Limited wt history between Chart Review and Care Everywhere.  Wt Readings from Last 10 Encounters:   12/31/20 82.2 kg (181 lb 3.5 oz)   11/25/19  72.6 kg (160 lb) (CE)      Dosing Weight: 69 kg (adjusted based on IBW of 64.5 kg and most recent wt of 82.2 kg)     ASSESSED NUTRITION NEEDS  Estimated Energy Needs: 8169-8453 kcals/day (25 - 30 kcals/kg)  Justification: Maintenance  Estimated Protein Needs: 76-97 grams protein/day (1.1 - 1.4 grams of pro/kg)  Justification: Post-op  Estimated Fluid Needs: 7493-9126 mL/day (1 mL/kcal)   Justification: Maintenance or per team     PHYSICAL FINDINGS  See malnutrition section below.  - Manny's angina  - 12/30: I&D of bilateral facial abscess via transoral and transcutaneous approaches, extraction of teeth #20, 21, 22, 23, 24, 25, 26, 27, 28, and 29, and biopsy of anterior mandible, and aspiration biopsy of facial abscess     MALNUTRITION  % Intake: Decreased intake does not meet criteria  % Weight Loss: Unable to assess d/t limited wt history   Subcutaneous Fat Loss: Facial region:  mild  Muscle Loss: Temporal:  mild and Thoracic region (clavicle, acromium bone, deltoid, trapezius, pectoral):  mild  Fluid Accumulation/Edema: right foot - mild   Malnutrition Diagnosis: Non-severe malnutrition in the context of acute illness and environmental factors     NUTRITION DIAGNOSIS  Predicted inadequate nutrient intake related to modified diet and recent oral procedure but pt reports good appetite with current diet reflecting diet following prior to admission.  "     INTERVENTIONS  Implementation  See Nutrition Prescription box above for details.    Goals  Patient to consume % of nutritionally adequate meal trays TID, or the equivalent with supplements/snacks.     Monitoring/Evaluation  Progress toward goals will be monitored and evaluated per protocol.      Patricia Lopez RD, LD  5A/5B pager: [phone]

## 2020-12-31 NOTE — OP NOTE
Oral & Maxillofacial Surgery Operative Note      Procedure:   Incision and drianage of bilateral facial abscess via transoral and transcutaneous approaches, extraction of teeth #20, 21, 22, 23, 24, 25, 26, 27, 28, and 29, and biopsy of anterior mandible, and aspiration biopsy of facial abscess     Pre-Operative Diagnosis:   Manny's Angina     Post-Operative Diagnosis:  Same as previous    STAFF SURGEON: Sharon Villalpando DDS  RESIDENT SURGEON: Bonifacio Berrios DMD  : Royal Tapia DDS     PERIOPERATIVE ANTIBIOTICS: IV Vancomycin and Zosyn     ESTIMATED BLOOD LOSS: 150ml     FLUID REPLACEMENT: Refer to anesthesia charting       URINE OUTPUT: None.     LOCAL ANESTHESIA: 10cc total of 0.5% bupivacaine with 1:200,000 epinephrine delivered via subcutaneous  local infiltration and bilateral RONALDO, long buccal nerve blocks and intra-oral local infiltration      SPECIMENS: Anterior mandible submitted to pathology      COMPLICATIONS: None.      DRAINS: Extra-oral submental penrose drain and extra-oral left-sided submandibular penrose drain     INDICATIONS FOR PROCEDURE: Bilateral facial abscess, gross caries, and potential for airway compromise     DESCRIPTION OF PROCEDURE: The patient was met preoperatively and the treatment plan was confirmed with the patient.  Informed consent was obtained and all questions were answered. The patient was brought back to OR 16 by the Anesthesia Service. Following induction of anesthesia, an oral endotracheal tube was placed. The patient's arms were tucked and padded.  The head and neck and oral cavity were scrubbed and painted with iodine. Surgeons stepped out to scrub and returned to don sterile gown and gloves. At that time, the surgical site was squared off and a split sheet drape was placed.           The patient's oral cavity was suctioned. A throat pack was placed. Local anesthesia was administered as stated above. The outline of the inferior border of the mandible was  marked with a marking pain and an additional marking line was made 2cm below the inferior border of the mandible in the left submandibular region and the submental region. AN aspirational biopsy was obtained from the left submandibular region via an extra-oral approach producng a serosanguinous fluid of  5ml and was sent for pathological evaluation. A #15 blade was used to make an incision down in the left submandibular region 2cm below the inferior border of the mandible to the SLDCF with appropriate utilization of cautery for hemostatic control. A hemostat was then used for blunt dissection to the inferior border of the mandible and was then spread as it was removed extra-orally. No purulence was produced at this time. A #15 blade was used to make an incision down to the SLDCF with appropriate utilization of cautery for hemostatic control in the submental region, 2 cm below the inferior border of the mandible. A hemostat was then used for blunt dissection to the inferior border of the mandible and was then spread as it was removed extra-orally. No purulence was produced at this time.     Attention then turned intra-orally. Intra-oral exam revealed extensive decay, class III mobility, and retained roots of remaining mandibular dentition #s, 20, 21, 22, 23, 24, 25, 26, 27, 28, and 29. A clinical decision was made to extract the remainder of the mandibular dentition. A #15 blade was used to make a sulcular incision from the distobuccal aspect of tooth #20 to the distobuccal aspect of tooth #29. A #9 periosteal elevator was used to reflect a full thickness mucoperiosteal flap on the buccal and lingual aspect of the mandible. No purulence was encountered. Teeth #20, 21, 22, 23, 24, 25, 26, 27, 28, and 29 were elevated and delivered with rongeurs. A rongeur was used to remove granulation tissue and was used to obtain numerous bony tissue specimens from the anterior mandible, which were sent for pathological evaluation.  The extraction sites were curetted and copiously irrigated with sterile saline. A 3-0 chromic gut suture was placed at the mandibular midline to re-approximate the gingival tissue. A 3-0 chromic gut suture was then placed in running fashion from the distal aspect of tooth #29 to midline and again from the distal aspect of tooth #20 to midline. The oral cavity was copiously irrigated at this time.   Attention then turned extra-orally. Incision sites in left submandibular and submental region were copiously irrigated with sterile saline. Penrose drains placed x 2 in submandibular and submental region with securement obtained with 4-0 silk suture in single interrupted fashion.      The throat pack was removed. An fuad-gastric tube was passed to decompress gastric contents. The patient was turned over to the Anesthesia Service and was awakened in the OR and transferred stable to the PACU.     Dr. Villalpando was present for the entirety of the procedure.      Bonifacio Berrios DMD  [facility] Resident

## 2021-01-01 LAB
ANION GAP SERPL CALCULATED.3IONS-SCNC: 8 MMOL/L (ref 3–14)
BUN SERPL-MCNC: 22 MG/DL (ref 7–30)
CALCIUM SERPL-MCNC: 8.7 MG/DL (ref 8.5–10.1)
CHLORIDE SERPL-SCNC: 104 MMOL/L (ref 94–109)
CO2 SERPL-SCNC: 26 MMOL/L (ref 20–32)
CREAT SERPL-MCNC: 1 MG/DL (ref 0.66–1.25)
CRP SERPL-MCNC: 130 MG/L (ref 0–8)
ERYTHROCYTE [DISTWIDTH] IN BLOOD BY AUTOMATED COUNT: 14.6 % (ref 10–15)
ERYTHROCYTE [SEDIMENTATION RATE] IN BLOOD BY WESTERGREN METHOD: 73 MM/H (ref 0–20)
GFR SERPL CREATININE-BSD FRML MDRD: 84 ML/MIN/{1.73_M2}
GLUCOSE SERPL-MCNC: 106 MG/DL (ref 70–99)
HCT VFR BLD AUTO: 32.6 % (ref 40–53)
HGB BLD-MCNC: 10.3 G/DL (ref 13.3–17.7)
MCH RBC QN AUTO: 27 PG (ref 26.5–33)
MCHC RBC AUTO-ENTMCNC: 31.6 G/DL (ref 31.5–36.5)
MCV RBC AUTO: 85 FL (ref 78–100)
PLATELET # BLD AUTO: 482 10E9/L (ref 150–450)
POTASSIUM SERPL-SCNC: 3.4 MMOL/L (ref 3.4–5.3)
RBC # BLD AUTO: 3.82 10E12/L (ref 4.4–5.9)
SODIUM SERPL-SCNC: 138 MMOL/L (ref 133–144)
WBC # BLD AUTO: 11.3 10E9/L (ref 4–11)

## 2021-01-01 PROCEDURE — 250N000009 HC RX 250

## 2021-01-01 PROCEDURE — 120N000002 HC R&B MED SURG/OB UMMC

## 2021-01-01 PROCEDURE — 250N000013 HC RX MED GY IP 250 OP 250 PS 637: Performed by: STUDENT IN AN ORGANIZED HEALTH CARE EDUCATION/TRAINING PROGRAM

## 2021-01-01 PROCEDURE — 99233 SBSQ HOSP IP/OBS HIGH 50: CPT | Mod: GC | Performed by: HOSPITALIST

## 2021-01-01 PROCEDURE — 36415 COLL VENOUS BLD VENIPUNCTURE: CPT | Performed by: STUDENT IN AN ORGANIZED HEALTH CARE EDUCATION/TRAINING PROGRAM

## 2021-01-01 PROCEDURE — 80048 BASIC METABOLIC PNL TOTAL CA: CPT | Performed by: STUDENT IN AN ORGANIZED HEALTH CARE EDUCATION/TRAINING PROGRAM

## 2021-01-01 PROCEDURE — 250N000011 HC RX IP 250 OP 636: Performed by: STUDENT IN AN ORGANIZED HEALTH CARE EDUCATION/TRAINING PROGRAM

## 2021-01-01 PROCEDURE — 86140 C-REACTIVE PROTEIN: CPT | Performed by: STUDENT IN AN ORGANIZED HEALTH CARE EDUCATION/TRAINING PROGRAM

## 2021-01-01 PROCEDURE — 258N000003 HC RX IP 258 OP 636: Performed by: EMERGENCY MEDICINE

## 2021-01-01 PROCEDURE — 85652 RBC SED RATE AUTOMATED: CPT | Performed by: STUDENT IN AN ORGANIZED HEALTH CARE EDUCATION/TRAINING PROGRAM

## 2021-01-01 PROCEDURE — 999N000128 HC STATISTIC PERIPHERAL IV START W/O US GUIDANCE

## 2021-01-01 PROCEDURE — 85027 COMPLETE CBC AUTOMATED: CPT | Performed by: STUDENT IN AN ORGANIZED HEALTH CARE EDUCATION/TRAINING PROGRAM

## 2021-01-01 PROCEDURE — 250N000011 HC RX IP 250 OP 636: Performed by: EMERGENCY MEDICINE

## 2021-01-01 RX ORDER — MAGNESIUM HYDROXIDE 1200 MG/15ML
LIQUID ORAL
Status: COMPLETED
Start: 2021-01-01 | End: 2021-01-01

## 2021-01-01 RX ORDER — NAPROXEN 500 MG/1
500 TABLET ORAL 2 TIMES DAILY WITH MEALS
COMMUNITY

## 2021-01-01 RX ORDER — NALOXONE HYDROCHLORIDE 0.4 MG/ML
0.2 INJECTION, SOLUTION INTRAMUSCULAR; INTRAVENOUS; SUBCUTANEOUS
Status: DISCONTINUED | OUTPATIENT
Start: 2021-01-01 | End: 2021-01-10 | Stop reason: HOSPADM

## 2021-01-01 RX ORDER — ALBUTEROL SULFATE 90 UG/1
2 AEROSOL, METERED RESPIRATORY (INHALATION) EVERY 4 HOURS PRN
COMMUNITY

## 2021-01-01 RX ORDER — NALOXONE HYDROCHLORIDE 0.4 MG/ML
0.4 INJECTION, SOLUTION INTRAMUSCULAR; INTRAVENOUS; SUBCUTANEOUS
Status: DISCONTINUED | OUTPATIENT
Start: 2021-01-01 | End: 2021-01-10 | Stop reason: HOSPADM

## 2021-01-01 RX ORDER — OXYCODONE HYDROCHLORIDE 5 MG/1
5-10 TABLET ORAL EVERY 4 HOURS PRN
Status: DISCONTINUED | OUTPATIENT
Start: 2021-01-01 | End: 2021-01-02

## 2021-01-01 RX ORDER — BUPROPION HYDROCHLORIDE 150 MG/1
450 TABLET ORAL EVERY MORNING
COMMUNITY

## 2021-01-01 RX ORDER — CHLORHEXIDINE GLUCONATE ORAL RINSE 1.2 MG/ML
15 SOLUTION DENTAL 2 TIMES DAILY
Status: DISCONTINUED | OUTPATIENT
Start: 2021-01-01 | End: 2021-01-10 | Stop reason: HOSPADM

## 2021-01-01 RX ADMIN — ACETAMINOPHEN 650 MG: 325 TABLET, FILM COATED ORAL at 14:15

## 2021-01-01 RX ADMIN — ACETAMINOPHEN 650 MG: 325 TABLET, FILM COATED ORAL at 17:58

## 2021-01-01 RX ADMIN — OXYCODONE HYDROCHLORIDE 10 MG: 5 TABLET ORAL at 17:58

## 2021-01-01 RX ADMIN — CHLORHEXIDINE GLUCONATE 0.12% ORAL RINSE 15 ML: 1.2 LIQUID ORAL at 13:06

## 2021-01-01 RX ADMIN — OXYCODONE HYDROCHLORIDE 5 MG: 5 TABLET ORAL at 00:08

## 2021-01-01 RX ADMIN — OXYCODONE HYDROCHLORIDE 5 MG: 5 TABLET ORAL at 04:59

## 2021-01-01 RX ADMIN — SODIUM CHLORIDE 500 ML: 900 IRRIGANT IRRIGATION at 13:08

## 2021-01-01 RX ADMIN — ACETAMINOPHEN 650 MG: 325 TABLET, FILM COATED ORAL at 00:08

## 2021-01-01 RX ADMIN — ACETAMINOPHEN 650 MG: 325 TABLET, FILM COATED ORAL at 10:11

## 2021-01-01 RX ADMIN — OXYCODONE HYDROCHLORIDE 10 MG: 5 TABLET ORAL at 10:14

## 2021-01-01 RX ADMIN — OXYCODONE HYDROCHLORIDE 10 MG: 5 TABLET ORAL at 14:15

## 2021-01-01 RX ADMIN — CHLORHEXIDINE GLUCONATE 0.12% ORAL RINSE 15 ML: 1.2 LIQUID ORAL at 19:08

## 2021-01-01 RX ADMIN — PIPERACILLIN AND TAZOBACTAM 3.38 G: 3; .375 INJECTION, POWDER, FOR SOLUTION INTRAVENOUS at 13:06

## 2021-01-01 RX ADMIN — DOCUSATE SODIUM 50 MG AND SENNOSIDES 8.6 MG 2 TABLET: 8.6; 5 TABLET, FILM COATED ORAL at 10:11

## 2021-01-01 RX ADMIN — PIPERACILLIN AND TAZOBACTAM 3.38 G: 3; .375 INJECTION, POWDER, FOR SOLUTION INTRAVENOUS at 00:08

## 2021-01-01 RX ADMIN — OXYCODONE HYDROCHLORIDE 10 MG: 5 TABLET ORAL at 22:22

## 2021-01-01 RX ADMIN — VANCOMYCIN HYDROCHLORIDE 1500 MG: 100 INJECTION, POWDER, LYOPHILIZED, FOR SOLUTION INTRAVENOUS at 10:10

## 2021-01-01 RX ADMIN — ACETAMINOPHEN 650 MG: 325 TABLET, FILM COATED ORAL at 04:59

## 2021-01-01 RX ADMIN — VANCOMYCIN HYDROCHLORIDE 1500 MG: 100 INJECTION, POWDER, LYOPHILIZED, FOR SOLUTION INTRAVENOUS at 22:23

## 2021-01-01 RX ADMIN — PIPERACILLIN AND TAZOBACTAM 3.38 G: 3; .375 INJECTION, POWDER, FOR SOLUTION INTRAVENOUS at 19:02

## 2021-01-01 RX ADMIN — PIPERACILLIN AND TAZOBACTAM 3.38 G: 3; .375 INJECTION, POWDER, FOR SOLUTION INTRAVENOUS at 06:00

## 2021-01-01 RX ADMIN — ACETAMINOPHEN 650 MG: 325 TABLET, FILM COATED ORAL at 22:22

## 2021-01-01 ASSESSMENT — ACTIVITIES OF DAILY LIVING (ADL)
ADLS_ACUITY_SCORE: 18
ADLS_ACUITY_SCORE: 16
ADLS_ACUITY_SCORE: 18

## 2021-01-01 ASSESSMENT — MIFFLIN-ST. JEOR: SCORE: 1595.58

## 2021-01-01 NOTE — PROGRESS NOTES
[facility]     Progress Note - Kim 4 Service        Date of Admission:  12/30/2020    Assessment & Plan           Porfirio Clarke is a 55 year old male with a PMHx of COPD, TBI, bipolar disorder, polysubstance abuse, and homelessness who was admitted on 12/30/2020 as an emergent transfer from [facility] per OMFS for progressively worsening left-sided facial swelling and pain with CT neck findings concerning for Manny's angina and mandibular osteomyelitis, s/p abscess drainage, teeth extraction, mandibular biopsy by OMFS on 12/30.     Manny Angina   Mandibular osteomyelitis  Presented to [facility] ED on 12/30 for progressively worsening left sided facial swelling and pain with associated fevers, dysphagia, odynophagia. CT neck indicated multifocal periapical abscesses and findings consistent with Manny's angina. Emergently transferred to [facility] for operative repair with incision and drainage of bilateral submandibular, sublingual and submental abscesses by OMFS. Airway evaluated by ENT, so signs of airway compromise. Taken to OR on 12/30, successful drainage of multiple abscesses, teeth extraction, mandibular biopsy. Gram stain with GPCs.  - Follow cultures (abcess, blood)  - Continue vanc/zosyn for now until cultures result  - ID following for osteomyelitis   - OMFS following   - Pain control: APAP and oxy PRN, increased regimen   - Head of bed 45 degrees  - Mechanical soft diet  - Peridex BID  - Warm saline rinses after meals  - Warm compresses extraorally for comfort      Chronic Conditions PTA:   COPD  Bipolar disorder  Polysubstance abuse  TBI  - IP Pharmacy Consulted as no record of PTA medications        Diet: Mechanical/Dental Soft Diet  Room Service  Snacks/Supplements Adult: Boost Shake; With Meals    Fluids: None  Lines: PIV  DVT Prophylaxis: Pneumatic Compression Devices  Mckay Catheter: not present  Code Status: Full Code           Disposition Plan    Expected discharge: 4-7 days, recommended to likely TCU once antibiotic plan established.  Entered: Aftab Gastelum MD 01/01/2021, 11:02 AM     The patient's care was discussed with the Attending Physician, Dr. Loya, Patient and ID Consultant.    Aftab Gastelum MD  [address]   Please see sign in/sign out for up to date coverage information  ______________________________________________________________________    Interval History   Patient seen for follow up of Manny's angina, POD2 from OMFS surgery (abcess drainage, teeth extraction, mandibular biopsy, etc). No other acute events overnight. States pain not well controlled, has high pain tolerance due to previous opiate use. Otherwise, doing well. Tolerating soft diet.    Data reviewed today: All imaging reviewed.    Physical Exam   Vital Signs: Temp: 98.4  F (36.9  C) Temp src: Axillary BP: 95/67 Pulse: 70   Resp: 16 SpO2: 93 % O2 Device: Nasal cannula Oxygen Delivery: 1.5 LPM  Weight: 181 lbs 3.49 oz  General: Pt laying comfortably in bed, no acute distress  HEENT: neck incisions dressings C/D/I, scant serosanguinous drainage from penrose drains, both drains intact   Resp: Breathing comfortably on 3L  Cardiac: RRR  Abdomen: Soft, non-tender  Ext: warm and dry    Data   Recent Labs   Lab 01/01/21  0438 12/31/20  0536 12/30/20  2112   WBC 11.3*  --  13.2*   HGB 10.3*  --  12.1*   MCV 85  --  84   *  --  418   INR  --  1.30*  --     137 138   POTASSIUM 3.4 3.4 3.3*   CHLORIDE 104 104 103   CO2 26 25 28   BUN 22 11 11   CR 1.00 0.89 0.93   ANIONGAP 8 9 7   SILVERIO 8.7 8.6 8.8   * 115* 91     No results found for this or any previous visit (from the past 24 hour(s)).

## 2021-01-01 NOTE — PLAN OF CARE
Assumed cares: [id number]    Events: Oxycodone dosage increased.   VS: VSS on RA except hypotensive. Capno WNL.   IV: PIV TKO for antibx    Neuro: A&Ox4. Garbled speech w/ numb chin. Numbness improving per pt.   Resp: Expiratory/inspiratory wheezing. Encouraged to TCDB. HOB >45 per MD note.   Cardiac: WDL.  GI/: Voiding adequately. Small loose BM today.   Nutrition: Pt ate 75% breakfast on mech/soft diet. Saline rinse post meal.   Skin: CDI. 2x pen alondra drain intact w/ gauze to collect small-moderate serosanguinous drainage.   Pain: Pt c/o pain in neck w/ inadequate relief from PRN oxycodone and tylenol q4. Pt states he has a high pain tolerance and that he feel no improvement from pain medications.   Activity: Pt up independently to bathroom.

## 2021-01-01 NOTE — PHARMACY-ADMISSION MEDICATION HISTORY
Admission Medication History Completed by Pharmacy    See [facility] Admission Navigator for allergy information, preferred outpatient pharmacy, prior to admission medications and immunization status.     Medication History Sources:     Patient    Patient had 1 rx he brought in (Bupropion), but he couldn't recall the other pharmacies he fills at    Changes made to PTA medication list (reason):    Added: All    Deleted: None    Changed: None    Additional Information:    Patient had a hard time recalling medications and pharmacy info, but rx bottle for 450mg of Bupropion Xl daily was brought in    Patient said does max strength rx naproxen BID (figured this is 500mg BID, but could not be verified other than CareEverywhere does note 440mg BID in history)    Pt said he uses an albuterol inhaler as needed    Pt said he was recently on lorazepam prn anxiety (couldn't find any info on dosing, etc), was switched a few months ago to Latuda (unknown dose- received 1 month samples as insurance needed prior auth, pt said wasn't really helpful and he hasn't taken for a few months). Said has been on prn alprazolam (low dose) before, and clonazepam (didn't like as he felt altered) for anxiety as well, but the provider he saw was trying to get him of lorazepam. Patient feels prn lorazepam has been the most effective for his anxiety.    [facility] was accessed to search for recent controlled medications to see if dosing available for lorazepam, but no controlled substance results for the last 12 months.    Prior to Admission medications    Medication Sig Last Dose Taking? Auth Provider   albuterol (PROAIR HFA/PROVENTIL HFA/VENTOLIN HFA) 108 (90 Base) MCG/ACT inhaler Inhale 2 puffs into the lungs every 4 hours as needed for shortness of breath / dyspnea or wheezing Past Week at Unknown time Yes Unknown, Entered By History   buPROPion (WELLBUTRIN XL) 150 MG 24 hr tablet Take 450 mg by mouth every morning Past Week at Unknown time Yes Unknown,  Entered By History   naproxen (NAPROSYN) 500 MG tablet Take 500 mg by mouth 2 times daily (with meals) Past Week at Unknown time Yes Unknown, Entered By History   Lurasidone HCl (LATUDA PO) Take by mouth daily Unknown dose, patient given 1 month of samples as never able to get insurance to pay for it More than a month at Unknown time  Unknown, Entered By History       Date completed: 01/01/21    Medication history completed by:     Rolando Robertson PharmD, [facility]    [phone]  Pager [phone]

## 2021-01-01 NOTE — PLAN OF CARE
Pt alert and oriented. C/o jaw and face pain. Oxycodone and tylenol administered x1 with adequate effect. Pt reported pain worse after attempting to eat dinner. Pt unable to eat more than a few bites of soft food. Tolerated x2 boost shakes. Penrose drains x2 in place with bloody drainage, kerlix changed. HOB at 45 degrees. Pt declined warm pack application. Voiding spontaneously. No BM this evening. Continue to monitor pain and effectiveness of interventions.

## 2021-01-01 NOTE — PLAN OF CARE
"Cares 2300 to 0700    BP 93/55 (BP Location: Left arm)   Pulse 70   Temp 98.4  F (36.9  C) (Axillary)   Resp 16   Ht 1.676 m (5' 6\")   Wt 82.2 kg (181 lb 3.5 oz)   SpO2 95%   BMI 29.25 kg/m      Pain: In left jaw & face from surgery. Tylenol & oxycodone given 2x tonight with some relief. Heat pack given for comfort.   Neuros: A&o x4. Cooperative.   Cardiac: WDL. Denies chest pain.   Resp: Breathing well on 1.5L NC. Denies SOB.  GI/: Voids adequately in urinal, no BM tonight.   Nutrition: Mechanically soft diet, swallows well. Good appetite.   IV/Drains: 2 penrose drains in jaw, unclamped, patent. Minimal serosanguinous drainage, kerlix dressing changed. R PIV TKO for abx.   Skin: Incision in jaw. Some facial swelling.  Activity: Assist x1. Pt has not gotten out of bed this shift.     Events this shift: VSS per pt baseline. Soft BP's. Afebrile. HOB @45 degrees per orders to facilitate drainage at incision site in jaw.    Continue to follow POC and monitor pt.     "

## 2021-01-01 NOTE — PROGRESS NOTES
"ORAL & MAXILLOFACIAL SURGERY   PROGRESS NOTE  Porfirio Clarke,  MRN: [id number],  : 1965          ASSESSMENT:  55 year old male s/p I&D of left sublingual and submental abscesses via transoral and transcutaneous approaches, extraction of teeth #20, 21, 22, 23, 24, 25, 26, 27, 28, and 29, and biopsy of anterior mandible bone on 2020 progressing well. Patient is expected to maintain current swelling for a couple days due to probable osteomyelitis. Patient may need future debridement of anterior mandible pending disease course.     We appreciate ID recommendations, we anticipate he will be on long term antibiotics per ID.      Recommendations:   Recommendations:  - Continue broad spectrum antibiotic coverage per ID until cultures back.   - Change fluffs as they become saturated  - Head of bed elevated 45 degrees  - Soft non chew diet  - Peridex BID  - Warm saline rinses after meals  - Pain management per primary team- OK for Ibuprofen  - Encourage ambulation, if not then recommend pneumoboots for dvt prophylaxis.   - Encourage PO intake.   - Warm compress extraorally prn comfort, do not use ice  - Penrose drains to be left in today and likely tomorrow. OMFS will evaluate daily and will plan to be removed prior to discharge.      Please contact the OMFS resident on-call with questions or concerns.     Discussed with chief resident and staff.     Srinath Leavitt DDS  Oral & Maxillofacial Surgery, PGY-1     ____________________________________        SUBJECTIVE:  Patient pleasant this morning, endorses some mild tenderness of the jaw and surgical site. He is curious when he is able to get out of the hospital. Explained that he will likely be here until his cultures are back and we are able to formulate a plan for his future antibiotic regimen.      PHYSICAL EXAM:   /78   Pulse 89   Temp 98.8  F (37.1  C) (Axillary)   Resp 15   Ht 1.676 m (5' 6\")   Wt 82.2 kg (181 lb 3.5 oz)   SpO2 96%   BMI 29.25 " kg/m    GEN: WD/WN male, NAD  HEENT: Left-sided facial swelling extending roughly 15 mm past midline in submental region      Ears: External ears are normal      Eyes: Pupils equal round and reactive to light, Extraocular eye movement intact      Nose: External alae are normal, there is no hemorrhage, septum midline, no septal hematoma, no crepitus/deviation of the nasal dorsum      Mouth:   Extraction sites of remaining mandibular dentition has mucofibrinous tissue which is consistent with healing, stitches in place, bilateral mandibular buccal vestibules are tender to palpation, no purulence noted to be draining intraorally, some mild food debris noted intraorally. Floor of mouth is mildly distended and tender but improved from pre-surgical state. JEREMY: 35mm.  Tongue and uvula are midline and in anatomic position, no lateral pharyngeal swelling. Mucosal membranes are moist.   Neck: 2 penrose drains in neck (one on left submandibular region and one midline submental area), both with mild serosanguinous drainage saturating fluffs in neuronetting.   NEURO: AAOx4, CN II-XII intact bilaterally  PSYCH: Appropriate mood and affect     LABS:   CRP Inflammation   Date Value Ref Range Status   01/01/2021 130.0 (H) 0.0 - 8.0 mg/L Final     CBC RESULTS:   Recent Labs   Lab Test 12/30/20  2112   WBC 13.2*   RBC 4.48   HGB 12.1*   HCT 37.7*   MCV 84   MCH 27.0   MCHC 32.1   RDW 14.2          RADIOLOGY:  None new

## 2021-01-02 LAB
ANION GAP SERPL CALCULATED.3IONS-SCNC: 9 MMOL/L (ref 3–14)
BACTERIA SPEC CULT: ABNORMAL
BACTERIA SPEC CULT: ABNORMAL
BUN SERPL-MCNC: 18 MG/DL (ref 7–30)
CALCIUM SERPL-MCNC: 8.8 MG/DL (ref 8.5–10.1)
CHLORIDE SERPL-SCNC: 108 MMOL/L (ref 94–109)
CO2 SERPL-SCNC: 25 MMOL/L (ref 20–32)
CREAT SERPL-MCNC: 1.22 MG/DL (ref 0.66–1.25)
CRP SERPL-MCNC: 61 MG/L (ref 0–8)
ERYTHROCYTE [DISTWIDTH] IN BLOOD BY AUTOMATED COUNT: 14.5 % (ref 10–15)
ERYTHROCYTE [SEDIMENTATION RATE] IN BLOOD BY WESTERGREN METHOD: 77 MM/H (ref 0–20)
GFR SERPL CREATININE-BSD FRML MDRD: 66 ML/MIN/{1.73_M2}
GLUCOSE SERPL-MCNC: 94 MG/DL (ref 70–99)
HCT VFR BLD AUTO: 34.3 % (ref 40–53)
HGB BLD-MCNC: 10.8 G/DL (ref 13.3–17.7)
MCH RBC QN AUTO: 26.3 PG (ref 26.5–33)
MCHC RBC AUTO-ENTMCNC: 31.5 G/DL (ref 31.5–36.5)
MCV RBC AUTO: 84 FL (ref 78–100)
PLATELET # BLD AUTO: 512 10E9/L (ref 150–450)
POTASSIUM SERPL-SCNC: 3.2 MMOL/L (ref 3.4–5.3)
RBC # BLD AUTO: 4.11 10E12/L (ref 4.4–5.9)
SODIUM SERPL-SCNC: 141 MMOL/L (ref 133–144)
SPECIMEN SOURCE: ABNORMAL
VANCOMYCIN SERPL-MCNC: 23.2 MG/L
WBC # BLD AUTO: 8.2 10E9/L (ref 4–11)

## 2021-01-02 PROCEDURE — 250N000011 HC RX IP 250 OP 636: Performed by: EMERGENCY MEDICINE

## 2021-01-02 PROCEDURE — 85027 COMPLETE CBC AUTOMATED: CPT | Performed by: STUDENT IN AN ORGANIZED HEALTH CARE EDUCATION/TRAINING PROGRAM

## 2021-01-02 PROCEDURE — 258N000003 HC RX IP 258 OP 636: Performed by: EMERGENCY MEDICINE

## 2021-01-02 PROCEDURE — 258N000003 HC RX IP 258 OP 636: Performed by: HOSPITALIST

## 2021-01-02 PROCEDURE — 36415 COLL VENOUS BLD VENIPUNCTURE: CPT | Performed by: STUDENT IN AN ORGANIZED HEALTH CARE EDUCATION/TRAINING PROGRAM

## 2021-01-02 PROCEDURE — 80048 BASIC METABOLIC PNL TOTAL CA: CPT | Performed by: STUDENT IN AN ORGANIZED HEALTH CARE EDUCATION/TRAINING PROGRAM

## 2021-01-02 PROCEDURE — 99232 SBSQ HOSP IP/OBS MODERATE 35: CPT | Performed by: HOSPITALIST

## 2021-01-02 PROCEDURE — 120N000002 HC R&B MED SURG/OB UMMC

## 2021-01-02 PROCEDURE — 85652 RBC SED RATE AUTOMATED: CPT | Performed by: STUDENT IN AN ORGANIZED HEALTH CARE EDUCATION/TRAINING PROGRAM

## 2021-01-02 PROCEDURE — 36415 COLL VENOUS BLD VENIPUNCTURE: CPT | Performed by: HOSPITALIST

## 2021-01-02 PROCEDURE — 80202 ASSAY OF VANCOMYCIN: CPT | Performed by: HOSPITALIST

## 2021-01-02 PROCEDURE — 250N000011 HC RX IP 250 OP 636: Performed by: HOSPITALIST

## 2021-01-02 PROCEDURE — 999N000128 HC STATISTIC PERIPHERAL IV START W/O US GUIDANCE

## 2021-01-02 PROCEDURE — 250N000013 HC RX MED GY IP 250 OP 250 PS 637: Performed by: STUDENT IN AN ORGANIZED HEALTH CARE EDUCATION/TRAINING PROGRAM

## 2021-01-02 PROCEDURE — 86140 C-REACTIVE PROTEIN: CPT | Performed by: STUDENT IN AN ORGANIZED HEALTH CARE EDUCATION/TRAINING PROGRAM

## 2021-01-02 PROCEDURE — 250N000011 HC RX IP 250 OP 636: Performed by: STUDENT IN AN ORGANIZED HEALTH CARE EDUCATION/TRAINING PROGRAM

## 2021-01-02 PROCEDURE — 250N000013 HC RX MED GY IP 250 OP 250 PS 637: Performed by: HOSPITALIST

## 2021-01-02 RX ORDER — OXYCODONE HYDROCHLORIDE 10 MG/1
10-20 TABLET ORAL EVERY 4 HOURS PRN
Status: DISCONTINUED | OUTPATIENT
Start: 2021-01-02 | End: 2021-01-03

## 2021-01-02 RX ORDER — POTASSIUM CHLORIDE 750 MG/1
40 TABLET, EXTENDED RELEASE ORAL ONCE
Status: COMPLETED | OUTPATIENT
Start: 2021-01-03 | End: 2021-01-02

## 2021-01-02 RX ORDER — BUPROPION HYDROCHLORIDE 150 MG/1
450 TABLET ORAL EVERY MORNING
Status: DISCONTINUED | OUTPATIENT
Start: 2021-01-02 | End: 2021-01-10 | Stop reason: HOSPADM

## 2021-01-02 RX ADMIN — PIPERACILLIN AND TAZOBACTAM 3.38 G: 3; .375 INJECTION, POWDER, FOR SOLUTION INTRAVENOUS at 00:13

## 2021-01-02 RX ADMIN — CHLORHEXIDINE GLUCONATE 0.12% ORAL RINSE 15 ML: 1.2 LIQUID ORAL at 20:03

## 2021-01-02 RX ADMIN — OXYCODONE HYDROCHLORIDE 20 MG: 10 TABLET ORAL at 12:36

## 2021-01-02 RX ADMIN — VANCOMYCIN HYDROCHLORIDE 1250 MG: 100 INJECTION, POWDER, LYOPHILIZED, FOR SOLUTION INTRAVENOUS at 23:41

## 2021-01-02 RX ADMIN — ALBUTEROL SULFATE 2 PUFF: 90 AEROSOL, METERED RESPIRATORY (INHALATION) at 21:28

## 2021-01-02 RX ADMIN — OXYCODONE HYDROCHLORIDE 10 MG: 5 TABLET ORAL at 02:33

## 2021-01-02 RX ADMIN — PIPERACILLIN AND TAZOBACTAM 3.38 G: 3; .375 INJECTION, POWDER, FOR SOLUTION INTRAVENOUS at 20:03

## 2021-01-02 RX ADMIN — BUPROPION HYDROCHLORIDE 450 MG: 150 TABLET, FILM COATED, EXTENDED RELEASE ORAL at 09:56

## 2021-01-02 RX ADMIN — PIPERACILLIN AND TAZOBACTAM 3.38 G: 3; .375 INJECTION, POWDER, FOR SOLUTION INTRAVENOUS at 07:51

## 2021-01-02 RX ADMIN — OXYCODONE HYDROCHLORIDE 20 MG: 10 TABLET ORAL at 21:25

## 2021-01-02 RX ADMIN — CHLORHEXIDINE GLUCONATE 0.12% ORAL RINSE 15 ML: 1.2 LIQUID ORAL at 07:51

## 2021-01-02 RX ADMIN — ALBUTEROL SULFATE 2 PUFF: 90 AEROSOL, METERED RESPIRATORY (INHALATION) at 13:17

## 2021-01-02 RX ADMIN — ACETAMINOPHEN 650 MG: 325 TABLET, FILM COATED ORAL at 02:33

## 2021-01-02 RX ADMIN — ALBUTEROL SULFATE 2 PUFF: 90 AEROSOL, METERED RESPIRATORY (INHALATION) at 02:30

## 2021-01-02 RX ADMIN — VANCOMYCIN HYDROCHLORIDE 1500 MG: 100 INJECTION, POWDER, LYOPHILIZED, FOR SOLUTION INTRAVENOUS at 10:00

## 2021-01-02 RX ADMIN — PIPERACILLIN AND TAZOBACTAM 3.38 G: 3; .375 INJECTION, POWDER, FOR SOLUTION INTRAVENOUS at 14:37

## 2021-01-02 RX ADMIN — ACETAMINOPHEN 650 MG: 325 TABLET, FILM COATED ORAL at 20:02

## 2021-01-02 RX ADMIN — ACETAMINOPHEN 650 MG: 325 TABLET, FILM COATED ORAL at 07:51

## 2021-01-02 RX ADMIN — OXYCODONE HYDROCHLORIDE 10 MG: 5 TABLET ORAL at 07:52

## 2021-01-02 RX ADMIN — POTASSIUM CHLORIDE 40 MEQ: 750 TABLET, EXTENDED RELEASE ORAL at 23:41

## 2021-01-02 RX ADMIN — ACETAMINOPHEN 650 MG: 325 TABLET, FILM COATED ORAL at 16:43

## 2021-01-02 RX ADMIN — DOCUSATE SODIUM 50 MG AND SENNOSIDES 8.6 MG 2 TABLET: 8.6; 5 TABLET, FILM COATED ORAL at 20:03

## 2021-01-02 RX ADMIN — OXYCODONE HYDROCHLORIDE 20 MG: 10 TABLET ORAL at 16:43

## 2021-01-02 ASSESSMENT — ACTIVITIES OF DAILY LIVING (ADL)
ADLS_ACUITY_SCORE: 16

## 2021-01-02 ASSESSMENT — MIFFLIN-ST. JEOR: SCORE: 1581.06

## 2021-01-02 NOTE — PLAN OF CARE
"Cares 2300 to 0700    /79 (BP Location: Left arm)   Pulse 85   Temp 95.2  F (35.1  C) (Axillary)   Resp 16   Ht 1.676 m (5' 6\")   Wt 82.3 kg (181 lb 6.4 oz)   SpO2 94%   BMI 29.28 kg/m      Pain: In left jaw & face from surgery. Pain unmanaged with current pain meds, declined heat pack.   Neuros: A&o x4. Calm, cooperative.   Cardiac: Denies chest pain.   Resp: Expiratory/inspiratory wheezing. Pt c/o nasal congestion. Albuterol inhaler given 1x with good relief. Denied SOB. Room air.   GI/: No BM tonight, voids adequately.   Nutrition: Mechanically soft diet, swallows well.  IV/Drains: 2 penrose drains in jaw, unclamped, dressing changed. R PIV TKO for abx.   Skin: Incision in jaw, some facial swelling present.   Activity: SBA. HOB @45 degrees per orders to facilitate drainage at incision site in jaw.     Continue to follow POC and monitor pt.      "

## 2021-01-02 NOTE — PLAN OF CARE
Pt alert and oriented x4. C/o pain to face and jaw. Oxycodone and tylenol administered x2 with minimal effect per pt. Penrose drains intact, small amt serosanguinous drainage. Dressing changed x1. Pt able to eat 75% meal. Reports Bm this evening. Voiding spontaneously. Up to BR with SBA. Continue to assess pain and effectiveness of interventions.

## 2021-01-02 NOTE — PROGRESS NOTES
[facility]     Progress Note - Kim 4 Service        Date of Admission:  12/30/2020    Assessment & Plan           Porfirio Clarke is a 55 year old male with a PMHx of COPD, TBI, bipolar disorder, polysubstance abuse, and homelessness who was admitted on 12/30/2020 as an emergent transfer from [facility] per OMFS for progressively worsening left-sided facial swelling and pain with CT neck findings concerning for Manny's angina and mandibular osteomyelitis, s/p abscess drainage, teeth extraction, mandibular biopsy by OMFS on 12/30.     Today:  - Follow up Submandibular abscess culture sen for Streptococcus intermedius  - Increase Oxycodone from 5-10 mg to 10-20 mg, pt requesting PO dilaudid  - Appreciate daily OMFS and Penrose drain, and wound care recs  - Restart home Wellbutrin    Manny Angina   Mandibular osteomyelitis  Presented to [facility] ED on 12/30 for progressively worsening left sided facial swelling and pain with associated fevers, dysphagia, odynophagia. CT neck indicated multifocal periapical abscesses and findings consistent with Manny's angina. Emergently transferred to [facility] for operative repair with incision and drainage of bilateral submandibular, sublingual and submental abscesses by OMFS. Airway evaluated by ENT, so signs of airway compromise. Taken to OR on 12/30, successful drainage of multiple abscesses, teeth extraction, mandibular biopsy. Gram stain with GPCs.  - Follow cultures (abcess, blood)   12/30 Submandibular Abscess Cx Moderate growth Streptococcus intermedius    -> Sen pending  - Continue vanc/zosyn for now until cultures result  - ID following for osteomyelitis   - OMFS following   - Pain control: APAP and oxy PRN, increased regimen   - Head of bed 45 degrees  - Mechanical soft diet  - Peridex BID  - Warm saline rinses after meals  - Warm compresses extraorally for comfort      Chronic Conditions PTA:   COPD  Bipolar  disorder  Polysubstance abuse  TBI  - IP Pharmacy Consulted as no record of PTA medications        Diet: Mechanical/Dental Soft Diet  Room Service  Snacks/Supplements Adult: Boost Shake; With Meals    Fluids: None  Lines: PIV  DVT Prophylaxis: Pneumatic Compression Devices  Mckay Catheter: not present  Code Status: Full Code           Disposition Plan   Expected discharge: 4-7 days, recommended to [facility] once antibiotic plan established.  Entered: Gaston Loya MD 01/02/2021, 9:07 AM     The patient's care was discussed with the Attending Physician, Dr. Loya, Patient and ID Consultant.    Gaston Loya MD  [address]   Please see sign in/sign out for up to date coverage information  ______________________________________________________________________    Interval History   Patient seen for follow up of Manny's angina,     POD3 from OMFS surgery (abcess drainage, teeth extraction, mandibular biopsy, etc).     No acute overnight events per patient or patient's family.    As of: January 2, 2021  Patient denies any headache, sore throat  Patient denies any sleeping disturbance  Patient denies any nausea or vomiting or abdominal pain  Patient denies any shortness of breath   Patient denies any chest pain    Patient reports pain in the neck/jaw is still not adequately controlled, wants increased Oxy or prefers PO Dilaudid. Agreeable to resume home Wellbutrin.  Aware of wound cares and plan for today.      Data reviewed today: All imaging reviewed.    Physical Exam   Vital Signs: Temp: 95.2  F (35.1  C) Temp src: Axillary BP: 128/79 Pulse: 85   Resp: 16 SpO2: 94 % O2 Device: None (Room air) Oxygen Delivery: 1.5 LPM  Weight: 181 lbs 6.4 oz    General: Pt laying comfortably in bed, no acute distress  HEENT: neck incisions dressings C/D/I, scant serosanguinous drainage from 2 penrose drains, both drains intact   Resp: Breathing comfortably on 1.5  liters  Cardiac: RRR  Abdomen: Soft, non-tender  Ext: warm and dry    Data   Recent Labs   Lab 01/02/21  0639 01/01/21  0438 12/31/20  0536 12/30/20  2112   WBC 8.2 11.3*  --  13.2*   HGB 10.8* 10.3*  --  12.1*   MCV 84 85  --  84   * 482*  --  418   INR  --   --  1.30*  --     138 137 138   POTASSIUM 3.2* 3.4 3.4 3.3*   CHLORIDE 108 104 104 103   CO2 25 26 25 28   BUN 18 22 11 11   CR 1.22 1.00 0.89 0.93   ANIONGAP 9 8 9 7   SILVERIO 8.8 8.7 8.6 8.8   GLC 94 106* 115* 91     No results found for this or any previous visit (from the past 24 hour(s)).

## 2021-01-02 NOTE — PROGRESS NOTES
"ORAL & MAXILLOFACIAL SURGERY   PROGRESS NOTE  Porfirio Clarke,  MRN: [id number],  : 1965           ASSESSMENT:  55 year old male s/p I&D of left sublingual and submental abscesses via transoral and transcutaneous approaches, extraction of teeth #20, 21, 22, 23, 24, 25, 26, 27, 28, and 29, and biopsy of anterior mandible bone on 2020 progressing well. Patient is expected to maintain current swelling for a couple days due to probable osteomyelitis. Patient may need future debridement of anterior mandible pending disease course.      We appreciate ID recommendations, we anticipate he will be on long term antibiotics per ID.      Recommendations:   Recommendations:  - Continue broad spectrum antibiotic coverage per ID until cultures back.   - Change fluffs as they become saturated  - Head of bed elevated 45 degrees  - Soft non chew diet  - Peridex BID  - Warm saline rinses after meals  - Pain management per primary team- OK for Ibuprofen  - Encourage ambulation, if not then recommend pneumoboots for dvt prophylaxis.   - Encourage PO intake.   - Warm compress extraorally prn comfort, do not use ice  - Penrose drains to be left in for this morning, will evaluate this afternoon for removal either this afternoon or tomorrow morning.      Please contact the OMFS resident on-call with questions or concerns.     Discussed with chief resident and staff.     Srinath Leavitt DDS  Oral & Maxillofacial Surgery, PGY-1     ____________________________________        SUBJECTIVE:  Patient pleasant this morning, endorses some mild tenderness of the jaw and surgical site. Patient reports he is able to eat some and feels like his swelling has improved a lot in the last couple days.      PHYSICAL EXAM:   /78   Pulse 89   Temp 98.8  F (37.1  C) (Axillary)   Resp 15   Ht 1.676 m (5' 6\")   Wt 82.2 kg (181 lb 3.5 oz)   SpO2 96%   BMI 29.25 kg/m    GEN: WD/WN male, NAD  HEENT: Left-sided facial swelling extending roughly 15 " mm past midline in submental region      Ears: External ears are normal      Eyes: Pupils equal round and reactive to light, Extraocular eye movement intact      Nose: External alae are normal, there is no hemorrhage, septum midline, no septal hematoma, no crepitus/deviation of the nasal dorsum      Mouth:   Extraction sites of remaining mandibular dentition has mucofibrinous tissue which is consistent with healing, stitches in place, bilateral mandibular buccal vestibules are tender to palpation, no purulence noted to be draining intraorally, some mild food debris noted intraorally. Floor of mouth is mildly distended on left and is hard and tender but improved from pre-surgical state. JEREMY: 35mm.  Tongue and uvula are midline and in anatomic position, no lateral pharyngeal swelling. Mucosal membranes are moist.   Neck: 2 penrose drains in neck (one on left submandibular region and one midline submental area), both with mild serosanguinous drainage saturating fluffs in neuronetting.   NEURO: AAOx4, CN II-XII intact bilaterally  PSYCH: Appropriate mood and affect   LABS:   Reviewed  CBC RESULTS:   Recent Labs   Lab Test 01/02/21  0639   WBC 8.2   RBC 4.11*   HGB 10.8*   HCT 34.3*   MCV 84   MCH 26.3*   MCHC 31.5   RDW 14.5   *     CRP Inflammation   Date Value Ref Range Status   01/02/2021 61.0 (H) 0.0 - 8.0 mg/L Final     RADIOLOGY:  Reviewed  None new

## 2021-01-02 NOTE — PHARMACY-VANCOMYCIN DOSING SERVICE
Pharmacy Vancomycin Note  Date of Service 2021  Patient's  1965   55 year old, male    Indication: Abscess  Goal Trough Level: 15-20 mg/L  Day of Therapy: 3  Current Vancomycin regimen:  1500 mg IV q12hrs    Current estimated CrCl = Estimated Creatinine Clearance: 68.1 mL/min (based on SCr of 1.22 mg/dL).    Creatinine for last 3 days  2020:  9:12 PM Creatinine 0.93 mg/dL  2020:  5:36 AM Creatinine 0.89 mg/dL  2021:  4:38 AM Creatinine 1.00 mg/dL  2021:  6:39 AM Creatinine 1.22 mg/dL    Recent Vancomycin Levels (past 3 days)  2021:  8:42 AM Vancomycin Level 23.2 mg/L. This represents a 10 hr trough.     Vancomycin IV Administrations (past 72 hours)                   vancomycin 1500 mg in 0.9% NaCl 250 ml intermittent infusion 1,500 mg (mg) 1,500 mg Given 21 1000     1,500 mg Given 21 2223     1,500 mg Given  1010     1,500 mg Given 20 2200     1,500 mg Given  0943    vancomycin 1500 mg in 0.9% NaCl 250 ml intermittent infusion 1,500 mg (mg) 1,500 mg Given 20 2136                Nephrotoxins and other renal medications (From now, onward)    Start     Dose/Rate Route Frequency Ordered Stop    20 1000  vancomycin 1500 mg in 0.9% NaCl 250 ml intermittent infusion 1,500 mg      1,500 mg  over 90 Minutes Intravenous EVERY 12 HOURS 20 0044  piperacillin-tazobactam (ZOSYN) 3.375 g vial to attach to  mL bag      3.375 g  over 30 Minutes Intravenous EVERY 6 HOURS 20 0044               Contrast Orders - past 72 hours (72h ago, onward)    None          Interpretation of levels and current regimen:  Trough level is  Supratherapeutic    Has serum creatinine changed > 50% in last 72 hours: No    Urine output:  unable to determine    Renal Function: Worsening    Plan:  1.  Decrease Dose to 1250 mg IVPB Q 12 hrs  2.  Pharmacy will check trough levels as appropriate in 1-3 Days.    3. Serum creatinine levels will be ordered  daily for the first week of therapy and at least twice weekly for subsequent weeks.      Thanks for the consult  Jeffrey Shaffer RPH        .

## 2021-01-03 LAB
ANION GAP SERPL CALCULATED.3IONS-SCNC: 10 MMOL/L (ref 3–14)
BACTERIA SPEC CULT: ABNORMAL
BUN SERPL-MCNC: 14 MG/DL (ref 7–30)
CALCIUM SERPL-MCNC: 8.8 MG/DL (ref 8.5–10.1)
CHLORIDE SERPL-SCNC: 109 MMOL/L (ref 94–109)
CO2 SERPL-SCNC: 21 MMOL/L (ref 20–32)
CREAT SERPL-MCNC: 1.18 MG/DL (ref 0.66–1.25)
CRP SERPL-MCNC: 39 MG/L (ref 0–8)
ERYTHROCYTE [SEDIMENTATION RATE] IN BLOOD BY WESTERGREN METHOD: 41 MM/H (ref 0–20)
GFR SERPL CREATININE-BSD FRML MDRD: 69 ML/MIN/{1.73_M2}
GLUCOSE SERPL-MCNC: 118 MG/DL (ref 70–99)
POTASSIUM SERPL-SCNC: 3.6 MMOL/L (ref 3.4–5.3)
SODIUM SERPL-SCNC: 140 MMOL/L (ref 133–144)
SPECIMEN SOURCE: ABNORMAL

## 2021-01-03 PROCEDURE — 250N000011 HC RX IP 250 OP 636: Performed by: HOSPITALIST

## 2021-01-03 PROCEDURE — 250N000013 HC RX MED GY IP 250 OP 250 PS 637: Performed by: STUDENT IN AN ORGANIZED HEALTH CARE EDUCATION/TRAINING PROGRAM

## 2021-01-03 PROCEDURE — 99233 SBSQ HOSP IP/OBS HIGH 50: CPT | Mod: GC | Performed by: HOSPITALIST

## 2021-01-03 PROCEDURE — 999N000128 HC STATISTIC PERIPHERAL IV START W/O US GUIDANCE

## 2021-01-03 PROCEDURE — 36415 COLL VENOUS BLD VENIPUNCTURE: CPT | Performed by: STUDENT IN AN ORGANIZED HEALTH CARE EDUCATION/TRAINING PROGRAM

## 2021-01-03 PROCEDURE — 80048 BASIC METABOLIC PNL TOTAL CA: CPT | Performed by: STUDENT IN AN ORGANIZED HEALTH CARE EDUCATION/TRAINING PROGRAM

## 2021-01-03 PROCEDURE — 250N000013 HC RX MED GY IP 250 OP 250 PS 637: Performed by: HOSPITALIST

## 2021-01-03 PROCEDURE — 85652 RBC SED RATE AUTOMATED: CPT | Performed by: STUDENT IN AN ORGANIZED HEALTH CARE EDUCATION/TRAINING PROGRAM

## 2021-01-03 PROCEDURE — 120N000002 HC R&B MED SURG/OB UMMC

## 2021-01-03 PROCEDURE — 86140 C-REACTIVE PROTEIN: CPT | Performed by: STUDENT IN AN ORGANIZED HEALTH CARE EDUCATION/TRAINING PROGRAM

## 2021-01-03 PROCEDURE — 99232 SBSQ HOSP IP/OBS MODERATE 35: CPT | Mod: GC | Performed by: STUDENT IN AN ORGANIZED HEALTH CARE EDUCATION/TRAINING PROGRAM

## 2021-01-03 PROCEDURE — 258N000003 HC RX IP 258 OP 636: Performed by: HOSPITALIST

## 2021-01-03 PROCEDURE — 250N000011 HC RX IP 250 OP 636: Performed by: STUDENT IN AN ORGANIZED HEALTH CARE EDUCATION/TRAINING PROGRAM

## 2021-01-03 PROCEDURE — 258N000003 HC RX IP 258 OP 636: Performed by: STUDENT IN AN ORGANIZED HEALTH CARE EDUCATION/TRAINING PROGRAM

## 2021-01-03 RX ORDER — ACETAMINOPHEN 325 MG/1
975 TABLET ORAL 3 TIMES DAILY
Status: DISCONTINUED | OUTPATIENT
Start: 2021-01-03 | End: 2021-01-10 | Stop reason: HOSPADM

## 2021-01-03 RX ORDER — HYDROMORPHONE HYDROCHLORIDE 2 MG/1
2-4 TABLET ORAL EVERY 4 HOURS PRN
Status: DISCONTINUED | OUTPATIENT
Start: 2021-01-03 | End: 2021-01-06

## 2021-01-03 RX ORDER — NAPROXEN 500 MG/1
500 TABLET ORAL 2 TIMES DAILY WITH MEALS
Status: DISCONTINUED | OUTPATIENT
Start: 2021-01-03 | End: 2021-01-10 | Stop reason: HOSPADM

## 2021-01-03 RX ADMIN — ALBUTEROL SULFATE 2 PUFF: 90 AEROSOL, METERED RESPIRATORY (INHALATION) at 20:01

## 2021-01-03 RX ADMIN — BUPROPION HYDROCHLORIDE 450 MG: 150 TABLET, FILM COATED, EXTENDED RELEASE ORAL at 09:35

## 2021-01-03 RX ADMIN — ALBUTEROL SULFATE 2 PUFF: 90 AEROSOL, METERED RESPIRATORY (INHALATION) at 09:37

## 2021-01-03 RX ADMIN — ACETAMINOPHEN 975 MG: 325 TABLET, FILM COATED ORAL at 14:57

## 2021-01-03 RX ADMIN — HYDROMORPHONE HYDROCHLORIDE 4 MG: 2 TABLET ORAL at 09:36

## 2021-01-03 RX ADMIN — ACETAMINOPHEN 975 MG: 325 TABLET, FILM COATED ORAL at 20:01

## 2021-01-03 RX ADMIN — NAPROXEN 500 MG: 500 TABLET ORAL at 18:47

## 2021-01-03 RX ADMIN — PIPERACILLIN AND TAZOBACTAM 3.38 G: 3; .375 INJECTION, POWDER, FOR SOLUTION INTRAVENOUS at 09:45

## 2021-01-03 RX ADMIN — ALBUTEROL SULFATE 2 PUFF: 90 AEROSOL, METERED RESPIRATORY (INHALATION) at 04:20

## 2021-01-03 RX ADMIN — HYDROMORPHONE HYDROCHLORIDE 4 MG: 2 TABLET ORAL at 18:50

## 2021-01-03 RX ADMIN — PIPERACILLIN AND TAZOBACTAM 3.38 G: 3; .375 INJECTION, POWDER, FOR SOLUTION INTRAVENOUS at 20:02

## 2021-01-03 RX ADMIN — OXYCODONE HYDROCHLORIDE 20 MG: 10 TABLET ORAL at 02:51

## 2021-01-03 RX ADMIN — VANCOMYCIN HYDROCHLORIDE 1250 MG: 100 INJECTION, POWDER, LYOPHILIZED, FOR SOLUTION INTRAVENOUS at 12:09

## 2021-01-03 RX ADMIN — HYDROMORPHONE HYDROCHLORIDE 4 MG: 2 TABLET ORAL at 14:57

## 2021-01-03 RX ADMIN — PIPERACILLIN AND TAZOBACTAM 3.38 G: 3; .375 INJECTION, POWDER, FOR SOLUTION INTRAVENOUS at 02:51

## 2021-01-03 RX ADMIN — NAPROXEN 500 MG: 500 TABLET ORAL at 14:57

## 2021-01-03 RX ADMIN — MICAFUNGIN SODIUM 100 MG: 50 INJECTION, POWDER, LYOPHILIZED, FOR SOLUTION INTRAVENOUS at 22:52

## 2021-01-03 RX ADMIN — CHLORHEXIDINE GLUCONATE 0.12% ORAL RINSE 15 ML: 1.2 LIQUID ORAL at 20:02

## 2021-01-03 RX ADMIN — PIPERACILLIN AND TAZOBACTAM 3.38 G: 3; .375 INJECTION, POWDER, FOR SOLUTION INTRAVENOUS at 15:06

## 2021-01-03 RX ADMIN — CHLORHEXIDINE GLUCONATE 0.12% ORAL RINSE 15 ML: 1.2 LIQUID ORAL at 09:36

## 2021-01-03 ASSESSMENT — ACTIVITIES OF DAILY LIVING (ADL)
ADLS_ACUITY_SCORE: 16

## 2021-01-03 ASSESSMENT — MIFFLIN-ST. JEOR: SCORE: 1562.01

## 2021-01-03 NOTE — PLAN OF CARE
"Assumed cares: [id number]  Events: Oxycodone dosage increased to 20mg. Improved pain control initially but pt reports second dose \"didn't touch the pain\".       VS: VSS on RA except hypotensive.  IV: PIV TKO for antibx  Resp: Expiratory/inspiratory wheezing. Encouraged to TCDB. HOB >45 per MD note.   Nutrition: Pt ate 75% breakfast on mech/soft diet. Saline rinse post meal.   Skin: CDI. 2x pen alondra drain intact w/ gauze to collect small-moderate serosanguinous drainage. Changed x1.   Pain: Pt c/o pain in neck w/ inadequate relief from PRN oxycodone and tylenol q4. Pt states he has a high pain tolerance and that he feel no improvement from pain medications.   Activity: Pt up independently to bathroom.       Plan: discharge after 4-7 w/ antibiotic plan established. Potential pen alondra removal tomorrow.   "

## 2021-01-03 NOTE — PLAN OF CARE
Assumed cares: [id number]  Events: Medial pen alondra drain removed by surgery. Vancomycin extravasation on R forearm PIV.       VS: VSS on RA except hypotensive.  IV: PIV TKO for antibx  Resp: Expiratory/inspiratory wheezing. Encouraged to TCDB. HOB >45 per MD note.   Nutrition: Pt ate 100% breakfast on mech/soft diet. Saline rinse post meal.   Skin: CDI. Left pen alondra drain intact w/ gauze to collect small-moderate serosanguinous drainage. Changed x1.   Pain: Pt c/o pain in neck w/ adequate relief from PRN dilaudid and tylenol q4.  Activity: Pt up independently to bathroom.         Plan: discharge after 4-7 w/ antibiotic plan established. Potential pen alondra removal tomorrow or Tuesday.

## 2021-01-03 NOTE — PLAN OF CARE
[id number] Pt awake for most of the night. Pain medication given with some relief reported. Small serosanguinous drainage from drains. A/O x4. VSS. Potassium replaced with recheck in the AM. Voiding spontaneously. Tolerated IV antibiotics.

## 2021-01-03 NOTE — PROGRESS NOTES
ORAL & MAXILLOFACIAL SURGERY   PROGRESS NOTE  Porfirio Clarke,  MRN: [id number],  : 1965        ASSESSMENT:  55 year old male s/p I&D of left sublingual and submental abscesses via transoral and transcutaneous approaches, extraction of teeth #20, 21, 22, 23, 24, 25, 26, 27, 28, and 29, and biopsy of anterior mandible bone on 2020 progressing well. Patient may need future debridement of anterior mandible pending disease course.      We appreciate ID recommendations, we anticipate he will be on long term antibiotics per ID.      Recommendations:   Recommendations:  - Continue antibiotics per ID recommendations  - Change fluffs as they become saturated  - Head of bed elevated 45 degrees  - Dental soft diet  - Peridex BID  - Warm saline rinses after meals  - Pain management per primary team- OK for Ibuprofen  - Encourage ambulation, if not then recommend pneumoboots for dvt prophylaxis.   - Encourage PO intake.   - Warm compress extraorally prn comfort, do not use ice  - Penrose drains: Submental penrose drain removed this morning. Left submandibular penrose drain still with small amount of drainage output. Plan to leave left submandibular drain in today and re-evaluate for removal tomorrow. If patient is discharged today, drain can be removed at our follow up appointment in OMFS clinic.   - Patient to follow up with OMFS in our clinic following discharge in [facility], 7th floor. [address] [place] [address]. OMFS clinic to call and schedule patient.      Please contact the OMFS resident on-call with questions or concerns.     Discussed with chief resident and staff.     Srinath Leavitt DDS  Oral & Maxillofacial Surgery, PGY-1     ____________________________________        SUBJECTIVE:  Patient pleasant this morning, endorses some mild tenderness of the jaw and surgical site. Patient still curious when he will be discharged. Informed him he will likely be discharged following an antibiotic plan  from ID. Talked to him about follow up in our OMFS clinic sometime next week to monitor his progression.      PHYSICAL EXAM:   Temp: 96.8  F (36  C) Temp src: Oral BP: (!) 138/91 Pulse: 93   Resp: 18 SpO2: 93 % O2 Device: None (Room air)      GEN: WD/WN male, NAD  HEENT: Left-sided facial swelling extending roughly 15 mm past midline in submental region      Ears: External ears are normal      Eyes: Pupils equal round and reactive to light, Extraocular eye movement intact      Nose: External alae are normal, there is no hemorrhage, septum midline, no septal hematoma, no crepitus/deviation of the nasal dorsum      Mouth:   Extraction sites of remaining mandibular dentition has mucofibrinous tissue which is consistent with healing, stitches in place, bilateral mandibular buccal vestibules are tender to palpation, no purulence noted to be draining intraorally. Left floor of mouth is mildly distended and tender but improved from yesterday. JEREMY: 35mm.  Tongue and uvula are midline and in anatomic position, no lateral pharyngeal swelling. Mucosal membranes are moist.   Neck: Left submandibular penrose drain with mild serosanguinous drainage not fully saturating fluffs. Submental drain was removed this morning as there was no drainage being output from site. Incisions on submental and submandibular area are clean.   NEURO: AAOx4, CN II-XII intact bilaterally  PSYCH: Appropriate mood and affect     LABS:   Reviewed  CRP Inflammation   Date Value Ref Range Status   01/03/2021 39.0 (H) 0.0 - 8.0 mg/L Final   CBC RESULTS:   Recent Labs   Lab Test 01/02/21  0639   WBC 8.2   RBC 4.11*   HGB 10.8*   HCT 34.3*   MCV 84   MCH 26.3*   MCHC 31.5   RDW 14.5   *     Last Comprehensive Metabolic Panel:  Sodium   Date Value Ref Range Status   01/03/2021 140 133 - 144 mmol/L Final     Potassium   Date Value Ref Range Status   01/03/2021 3.6 3.4 - 5.3 mmol/L Final     Chloride   Date Value Ref Range Status   01/03/2021 109 94 - 109  mmol/L Final     Carbon Dioxide   Date Value Ref Range Status   01/03/2021 21 20 - 32 mmol/L Final     Anion Gap   Date Value Ref Range Status   01/03/2021 10 3 - 14 mmol/L Final     Glucose   Date Value Ref Range Status   01/03/2021 118 (H) 70 - 99 mg/dL Final     Urea Nitrogen   Date Value Ref Range Status   01/03/2021 14 7 - 30 mg/dL Final     Creatinine   Date Value Ref Range Status   01/03/2021 1.18 0.66 - 1.25 mg/dL Final     GFR Estimate   Date Value Ref Range Status   01/03/2021 69 >60 mL/min/[1.73_m2] Final     Comment:     Non  GFR Calc  Starting 12/18/2018, serum creatinine based estimated GFR (eGFR) will be   calculated using the Chronic Kidney Disease Epidemiology Collaboration   (CKD-EPI) equation.       Calcium   Date Value Ref Range Status   01/03/2021 8.8 8.5 - 10.1 mg/dL Final       RADIOLOGY:  None new

## 2021-01-03 NOTE — PROGRESS NOTES
[facility]     Progress Note - Marfred 4 Service        Date of Admission:  12/30/2020    Assessment & Plan           Porfirio Clarke is a 55 year old male with a PMHx of COPD, TBI, bipolar disorder, polysubstance abuse, and homelessness who was admitted on 12/30/2020 as an emergent transfer from [facility] per OMFS for progressively worsening left-sided facial swelling and pain with CT neck findings concerning for Manny's angina and mandibular osteomyelitis, s/p abscess drainage, teeth extraction, mandibular biopsy by OMFS on 12/30.     Today:  - Continue current abx, awaiting culture sensitivities   - Appreciate daily OMFS and Penrose drain, and wound care recs - one penrose drain out today, anticipate removal of the other tomorrow   - Pain control: Transition from PO oxy to PO dilaudid today, addition of naproxen 500mg BID    Manny Angina   Mandibular osteomyelitis  Presented to [facility] ED on 12/30 for progressively worsening left sided facial swelling and pain with associated fevers, dysphagia, odynophagia. CT neck indicated multifocal periapical abscesses and findings consistent with Manny's angina. Emergently transferred to [facility] for operative repair with incision and drainage of bilateral submandibular, sublingual and submental abscesses by OMFS. Airway evaluated by ENT, so signs of airway compromise. Taken to OR on 12/30, successful drainage of multiple abscesses, teeth extraction, mandibular biopsy. Gram stain with GPCs. Mandibular bone culture with light growth Strep Anginosis. Submandibular abscess cx moderate growth Strep Intermedius.   - Await culture sensitivities   - Continue vanc/zosyn for now until sensitivities result  - ID following for osteomyelitis   - OMFS following   - Pain control: APAP TID scheduled, naproxen BID scheduled, dilaudid PRN  - Head of bed 45 degrees  - Mechanical soft diet  - Peridex BID  - Warm saline rinses after meals  -  Warm compresses extraorally for comfort   - Patient to follow up with OMFS clinic following discharge. OMFS clinic to call and schedule     Chronic Conditions PTA:   COPD: albuterol inhaler PTN  Bipolar disorder: PTA wellbutrin  Polysubstance abuse  TBI     Diet: Mechanical/Dental Soft Diet  Room Service  Snacks/Supplements Adult: Boost Shake; With Meals    Fluids: None  Lines: PIV  DVT Prophylaxis: Pneumatic Compression Devices  Mckay Catheter: not present  Code Status: Full Code           Disposition Plan   Expected discharge: 3-5 days, recommended to likely TCU once antibiotic plan established.  Entered: Aftab Gastelum MD 01/03/2021, 7:13 AM     The patient's care was discussed with the Attending Physician, Dr. Loya, Patient.    Aftab Gastelum MD  [address]   Please see sign in/sign out for up to date coverage information  ______________________________________________________________________    Interval History   Patient seen for follow up of Manny's angina, mandibular osteomyelitis. POD4 from OMFS surgery (abcess drainage, teeth extraction, mandibular biopsy, etc). No acute events overnight. Pain regimen increased yesterday, still not adequately controlled. Tolerating diet very well with good PO intake. One drain out today, other will likely come out tomorrow. No new complaints.     Data reviewed today: All imaging reviewed.    Physical Exam   Vital Signs: Temp: 96.8  F (36  C) Temp src: Oral BP: (!) 138/91 Pulse: 93   Resp: 18 SpO2: 93 % O2 Device: None (Room air)    Weight: 178 lbs 3.2 oz  General: Pt laying comfortably in bed, no acute distress  HEENT: neck incision dressings C/D/I, scant serosanguinous drainage from penrose drains, one drain removed with one drain remaining  Resp: Breathing comfortably on room air  Cardiac: RRR  Abdomen: Soft, non-tender  Ext: warm and dry    Data   Recent Labs   Lab 01/02/21  0639 01/01/21  0437  12/31/20  0536 12/30/20  2112   WBC 8.2 11.3*  --  13.2*   HGB 10.8* 10.3*  --  12.1*   MCV 84 85  --  84   * 482*  --  418   INR  --   --  1.30*  --     138 137 138   POTASSIUM 3.2* 3.4 3.4 3.3*   CHLORIDE 108 104 104 103   CO2 25 26 25 28   BUN 18 22 11 11   CR 1.22 1.00 0.89 0.93   ANIONGAP 9 8 9 7   SILVERIO 8.8 8.7 8.6 8.8   GLC 94 106* 115* 91     No results found for this or any previous visit (from the past 24 hour(s)).

## 2021-01-03 NOTE — PROVIDER NOTIFICATION
Low 5B AMITA Alvarez 4 39-1 Just noticed his potassium today was 3.2. Nursing replacement order set not in place. I did not see any correction given. Orders? Thanks Mirian LAZO [id number]

## 2021-01-03 NOTE — PROGRESS NOTES
"  [facility] PROGRESS NOTE     Patient:  Porfirio Clarke   Date of birth 1965, Medical record number [id number]  Date of Visit:  01/03/2021  Date of Admission: 12/30/2020  Consult Requester:Gaston Loya MD          Assessment and Recommendations:   ASSESSMENT:  1. 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 Aurelia's angina  3. Social: Polysubstance use, housing insecurity    DISCUSSION:   Porfirio Clarke is a 55 year old male with history significant for osteomyelitis and discitis of spine (11/2016), polysubstance abuse, bipolar 1 disorder, and housing insecurity who presented to [facility] ED with facial swelling on 12/30/20, transferred to [facility] on the same day after CT revealed mandibular osteomyelitis and changes consistent with Aurelia's angina. He was taken to OR on 12/30/20 by OMFS for extraction of 10 teeth, I&D of abscess, and biopsy of anterior mandible. Abscess fluid and tissue from mandible sent for cultures- growing Strep anginosus, Strep intermedius, Actinomyces, yeast, and light growth of mixed respiratory roc. Blood cultures remaining NGDT. Inflammatory markers downtrending and patient remaining clinically stable. Would continue pip-tazo at this time while awaiting culture finalization, then anticipate narrowing, likely to an oral agent. Can stop Vanc at this time. Would add micafungin at this time while awaiting yeast speciation.      RECOMMENDATION:  1. Stop vancomycin  2. Continue pip-tazo  3. Start micafungin (100mg, IV, daily)    Thank you for this consult. ID will continue to follow.     Rusty Tabares MD  ID Fellow, PGY-5  [phone]  ______________________________________________________________    Consult Question: aurelia angina, concern for osteo.  Admission Diagnosis: Aurelia's angina [K12.2]  Oral abscess [K12.2]        Interval Events:     Patient feeling \"fine.\" Less pain in neck. Denies f/c. Looking forward to going home. " "    CRP and WBC have been down-trending. No fevers.    Abscess cultures now growing Strep anginosus, Strep intermedium, Actinomyces, and yeast.     Antimicrobials:  Current:  - Vancomycin (start 12/30)  - Zosyn (start 12/31)         Review of Systems:   4-system ROS completed in detail. Pertinent findings as above.          Allergies:     Allergies   Allergen Reactions     Ceftriaxone Anaphylaxis     Per Care Everywhere, patient has tolerated penicillins previously.     Codeine Nausea and Vomiting and Other (See Comments)     Nauseous and itchiness (says \"it still works); PN Reaction Type: Allergy; PN Noted: 20060912  Nauseous and itchiness (says \"it still works); PN Reaction Type: Allergy; PN Noted: 20060912       Cephalexin Other (See Comments) and Rash     Skin slothing/peeling per pt. Has tolerated penicillins.  Skin slothing/peeling per pt. Has tolerated penicillins.              Physical Exam:   Vitals were reviewed  Patient Vitals for the past 24 hrs:   BP Temp Temp src Pulse Resp SpO2 Weight   01/03/21 1536 113/75 98.4  F (36.9  C) Axillary 87 18 94 % --   01/03/21 0544 (!) 138/91 96.8  F (36  C) Oral 93 18 93 % --   01/02/21 2230 132/86 98  F (36.7  C) Oral 104 16 92 % --   01/02/21 1630 -- -- -- -- -- -- 80.8 kg (178 lb 3.2 oz)       Physical Examination:  GENERAL:  Awake, alert, oriented and in NAD lying supine in bed. Pleasant and conversant.  HEENT:  Head is normocephalic, atraumatic   EYES:  Eyes have anicteric sclerae without conjunctival injection or stigmata of endocarditis.    ENT:  Oropharynx is moist. S/p dental extractions. Penrose drain in place.  NECK:  Supple. +penrose drain x2 left and midline anterior neck.  LUNGS:  Diffuse expiratory wheezing with diminished bases bilaterally.   CARDIOVASCULAR:  RRR, +S1/S2, no murmur appreciated  ABDOMEN:  soft, nontender, nondistended, hypoactive bowel sounds.   SKIN:  No acute rashes. Scabs on anterior/radial aspect of left wrist, no web space lesions " visible. No stigmata of endocarditis.  EXTREMITIES: Warm, no mottling or edema. +firm, quarter sized swelling just below mid-shin on right leg with no overlying erythema, ecchymosis, or tenderness to palpation  NEUROLOGIC:  Grossly nonfocal. Active x4 extremities         Laboratory Data:     Inflammatory Markers    Recent Labs   Lab Test 01/03/21  0610 01/02/21  0639 01/01/21  0438 12/31/20  0536 12/30/20  2112   SED 41* 77* 73* 62* 59*   CRP 39.0* 61.0* 130.0* 210.0* 190.0*       Hematology Studies    Recent Labs   Lab Test 01/02/21  0639 01/01/21  0438 12/30/20  2112   WBC 8.2 11.3* 13.2*   ANEU  --   --  9.8*   AEOS  --   --  0.4   HGB 10.8* 10.3* 12.1*   MCV 84 85 84   * 482* 418       Metabolic Studies     Recent Labs   Lab Test 01/03/21  0610 01/02/21  0639 01/01/21  0438 12/31/20  0536 12/30/20  2112    141 138 137 138   POTASSIUM 3.6 3.2* 3.4 3.4 3.3*   CHLORIDE 109 108 104 104 103   CO2 21 25 26 25 28   BUN 14 18 22 11 11   CR 1.18 1.22 1.00 0.89 0.93   GFRESTIMATED 69 66 84 >90 >90       Hepatic Studies  No lab results found.    Microbiology:  Culture Micro   Date Value Ref Range Status   12/30/2020 (A)  Preliminary    Light growth  Actinomyces species  Identification obtained by MALDI-TOF mass spectrometry research use only database. Test   characteristics determined and verified by the Infectious Diseases Diagnostic Laboratory   ([facility]) [place], [place].  Susceptibility testing not routinely done     12/30/2020 Light growth  Mixed respiratory roc   (A)  Preliminary   12/30/2020 Yeast isolated (A)  Preliminary   12/30/2020 Light growth  Normal oral roc    Final   12/30/2020 (A)  Final    Light growth  Streptococcus anginosus  This organism is susceptible to ampicillin, penicillin, vancomycin and the cephalosporins.   If treatment is required AND your patient is allergic to penicillin, contact the   Microbiology Lab within 5 days to request susceptibility testing.  Testing unavailable due to  's backorder.     12/30/2020 (A)  Final    Moderate growth  Streptococcus intermedius  This organism is susceptible to ampicillin, penicillin, vancomycin and the cephalosporins.   If treatment is required AND your patient is allergic to penicillin, contact the   Microbiology Lab within 5 days to request susceptibility testing.  Testing unavailable due to 's backorder.     12/30/2020 Culture negative monitoring continues  Preliminary   12/30/2020 Culture negative after 1 day  Preliminary   12/30/2020 No growth after 4 days  Preliminary   12/30/2020 No growth after 4 days  Preliminary       Urine Studies  No lab results found.    Vancomycin Levels    Recent Labs   Lab Test 01/02/21  0842   VANCOMYCIN 23.2       Hepatitis B Testing No lab results found.  Hepatitis C Testing   No results found for: HCVAB, HQTG, HCGENO, HCPCR, HQTRNA, HEPRNA  Respiratory Virus Testing    No results found for: RS, FLUAG          Imaging:     CT soft tissue neck w/ contrast (12/30/20; via Care Everywhere)  1.  Extensively carious mandibular dentition with multifocal periapical abscesses as described above. With this, there is extensive osseous destruction throughout the adjacent mandibular genu, likely representing osteomyelitis.  2.  Extensive soft tissue swelling and inflammatory changes throughout the perimandibular and submental soft tissues, also extending into the left premaxillary soft tissues, left lateral face, and upper neck, with inflammatory thickening of the platysma   muscles, left greater than right. Multifocal enlarged, likely reactive lymph nodes throughout the submental and bilateral submandibular station, as well as in the upper neck bilaterally. 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  3.  Mild to moderate biapical paraseptal pulmonary emphysema, right greater than left.

## 2021-01-04 LAB
ANION GAP SERPL CALCULATED.3IONS-SCNC: 10 MMOL/L (ref 3–14)
BUN SERPL-MCNC: 11 MG/DL (ref 7–30)
CALCIUM SERPL-MCNC: 9.1 MG/DL (ref 8.5–10.1)
CHLORIDE SERPL-SCNC: 109 MMOL/L (ref 94–109)
CO2 SERPL-SCNC: 20 MMOL/L (ref 20–32)
COPATH REPORT: NORMAL
CREAT SERPL-MCNC: 1.17 MG/DL (ref 0.66–1.25)
CRP SERPL-MCNC: 32 MG/L (ref 0–8)
ERYTHROCYTE [SEDIMENTATION RATE] IN BLOOD BY WESTERGREN METHOD: 84 MM/H (ref 0–20)
GFR SERPL CREATININE-BSD FRML MDRD: 69 ML/MIN/{1.73_M2}
GLUCOSE SERPL-MCNC: 100 MG/DL (ref 70–99)
POTASSIUM SERPL-SCNC: 3.4 MMOL/L (ref 3.4–5.3)
SODIUM SERPL-SCNC: 140 MMOL/L (ref 133–144)

## 2021-01-04 PROCEDURE — 250N000011 HC RX IP 250 OP 636: Performed by: STUDENT IN AN ORGANIZED HEALTH CARE EDUCATION/TRAINING PROGRAM

## 2021-01-04 PROCEDURE — 120N000002 HC R&B MED SURG/OB UMMC

## 2021-01-04 PROCEDURE — 999N000127 HC STATISTIC PERIPHERAL IV START W US GUIDANCE

## 2021-01-04 PROCEDURE — 85652 RBC SED RATE AUTOMATED: CPT | Performed by: STUDENT IN AN ORGANIZED HEALTH CARE EDUCATION/TRAINING PROGRAM

## 2021-01-04 PROCEDURE — 80048 BASIC METABOLIC PNL TOTAL CA: CPT | Performed by: STUDENT IN AN ORGANIZED HEALTH CARE EDUCATION/TRAINING PROGRAM

## 2021-01-04 PROCEDURE — 250N000013 HC RX MED GY IP 250 OP 250 PS 637: Performed by: STUDENT IN AN ORGANIZED HEALTH CARE EDUCATION/TRAINING PROGRAM

## 2021-01-04 PROCEDURE — 258N000003 HC RX IP 258 OP 636: Performed by: STUDENT IN AN ORGANIZED HEALTH CARE EDUCATION/TRAINING PROGRAM

## 2021-01-04 PROCEDURE — 86140 C-REACTIVE PROTEIN: CPT | Performed by: STUDENT IN AN ORGANIZED HEALTH CARE EDUCATION/TRAINING PROGRAM

## 2021-01-04 PROCEDURE — 99233 SBSQ HOSP IP/OBS HIGH 50: CPT | Mod: GC | Performed by: HOSPITALIST

## 2021-01-04 PROCEDURE — 36415 COLL VENOUS BLD VENIPUNCTURE: CPT | Performed by: STUDENT IN AN ORGANIZED HEALTH CARE EDUCATION/TRAINING PROGRAM

## 2021-01-04 PROCEDURE — 250N000013 HC RX MED GY IP 250 OP 250 PS 637: Performed by: HOSPITALIST

## 2021-01-04 PROCEDURE — 99233 SBSQ HOSP IP/OBS HIGH 50: CPT | Mod: GC | Performed by: INTERNAL MEDICINE

## 2021-01-04 RX ORDER — AMPICILLIN AND SULBACTAM 2; 1 G/1; G/1
3 INJECTION, POWDER, FOR SOLUTION INTRAMUSCULAR; INTRAVENOUS EVERY 6 HOURS
Status: DISCONTINUED | OUTPATIENT
Start: 2021-01-04 | End: 2021-01-10 | Stop reason: HOSPADM

## 2021-01-04 RX ADMIN — HYDROMORPHONE HYDROCHLORIDE 4 MG: 2 TABLET ORAL at 01:14

## 2021-01-04 RX ADMIN — AMPICILLIN SODIUM AND SULBACTAM SODIUM 3 G: 2; 1 INJECTION, POWDER, FOR SOLUTION INTRAMUSCULAR; INTRAVENOUS at 14:45

## 2021-01-04 RX ADMIN — NAPROXEN 500 MG: 500 TABLET ORAL at 09:27

## 2021-01-04 RX ADMIN — ACETAMINOPHEN 975 MG: 325 TABLET, FILM COATED ORAL at 14:12

## 2021-01-04 RX ADMIN — HYDROMORPHONE HYDROCHLORIDE 4 MG: 2 TABLET ORAL at 06:57

## 2021-01-04 RX ADMIN — PIPERACILLIN AND TAZOBACTAM 3.38 G: 3; .375 INJECTION, POWDER, FOR SOLUTION INTRAVENOUS at 09:28

## 2021-01-04 RX ADMIN — HYDROMORPHONE HYDROCHLORIDE 4 MG: 2 TABLET ORAL at 16:16

## 2021-01-04 RX ADMIN — AMPICILLIN SODIUM AND SULBACTAM SODIUM 3 G: 2; 1 INJECTION, POWDER, FOR SOLUTION INTRAMUSCULAR; INTRAVENOUS at 18:02

## 2021-01-04 RX ADMIN — CHLORHEXIDINE GLUCONATE 0.12% ORAL RINSE 15 ML: 1.2 LIQUID ORAL at 19:18

## 2021-01-04 RX ADMIN — HYDROMORPHONE HYDROCHLORIDE 4 MG: 2 TABLET ORAL at 11:53

## 2021-01-04 RX ADMIN — CHLORHEXIDINE GLUCONATE 0.12% ORAL RINSE 15 ML: 1.2 LIQUID ORAL at 09:27

## 2021-01-04 RX ADMIN — BUPROPION HYDROCHLORIDE 450 MG: 150 TABLET, FILM COATED, EXTENDED RELEASE ORAL at 09:27

## 2021-01-04 RX ADMIN — HYDROMORPHONE HYDROCHLORIDE 4 MG: 2 TABLET ORAL at 21:52

## 2021-01-04 RX ADMIN — ACETAMINOPHEN 975 MG: 325 TABLET, FILM COATED ORAL at 09:27

## 2021-01-04 RX ADMIN — ACETAMINOPHEN 975 MG: 325 TABLET, FILM COATED ORAL at 19:17

## 2021-01-04 RX ADMIN — ALBUTEROL SULFATE 2 PUFF: 90 AEROSOL, METERED RESPIRATORY (INHALATION) at 14:12

## 2021-01-04 RX ADMIN — MICAFUNGIN SODIUM 100 MG: 50 INJECTION, POWDER, LYOPHILIZED, FOR SOLUTION INTRAVENOUS at 21:42

## 2021-01-04 RX ADMIN — NAPROXEN 500 MG: 500 TABLET ORAL at 18:02

## 2021-01-04 ASSESSMENT — ACTIVITIES OF DAILY LIVING (ADL)
ADLS_ACUITY_SCORE: 16

## 2021-01-04 ASSESSMENT — MIFFLIN-ST. JEOR: SCORE: 1552.75

## 2021-01-04 NOTE — PLAN OF CARE
"Assumed cares 0700 - 1530    /85 (BP Location: Right arm)   Pulse 97   Temp 97.1  F (36.2  C) (Oral)   Resp 20   Ht 1.676 m (5' 6\")   Wt 78.9 kg (174 lb)   SpO2 95%   BMI 28.08 kg/m      A&Ox4. VSS on RA. Pt reported pain in face. Dilaudid given - pt reported some relief. Independent in room. Mechanical/soft diet - good appetite. Zosyn discontinued and Unasyn started. Both surgical drains removed. R PIV extravasation. L PIV infusing ABX. Will continue to monitor and update MD with any changes.      Jenifer Avendaño RN on 1/4/2021 at 2:50 PM    "

## 2021-01-04 NOTE — PROGRESS NOTES
[facility] PROGRESS NOTE     Patient:  Porfirio Clarke   Date of birth 1965, Medical record number [id number]  Date of Visit:  01/04/2021  Date of Admission: 12/30/2020  Consult Requester:Gaston Loya MD          Assessment and Recommendations:   ASSESSMENT:  1. 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 Aurelia's angina  3. Social: Polysubstance use, housing insecurity    DISCUSSION:   Porfirio Clarke is a 55 year old male with history significant for osteomyelitis and discitis of spine (11/2016), polysubstance abuse, bipolar 1 disorder, and housing insecurity who presented to [facility] ED with facial swelling on 12/30/20, transferred to [facility] on the same day after CT revealed mandibular osteomyelitis and submandibular changes consistent with Aurelia's angina. He was taken to OR on 12/30/20 by OMFS for extraction of 10 teeth, I&D of abscess, and biopsy of anterior mandible. Abscess fluid and tissue from mandible sent for cultures- growing Strep anginosus, Strep intermedius, Actinomyces, yeast, and light growth of mixed respiratory roc. Blood cultures remaining NGDT. Inflammatory markers downtrending and patient remaining clinically stable. Based on cultures, can narrow Pip-tazo to amp-sulbactam at this time. Given the growth of actinomyces, likely will warrant a few weeks of IV antibiotics prior to transition to orals. Continue micafungin at this time while awaiting yeast speciation.      RECOMMENDATION:  1. Transition pip-tazo to ampicillin-sulbactam (3g, IV, q6 hours)  2. Continue micafungin (100mg, IV, daily) for the time being.     Thank you for this consult. ID will continue to follow.     Rusty Tabares MD  ID Fellow, PGY-5  [phone]  ______________________________________________________________    Consult Question: aurelia angina, concern for osteo.  Admission Diagnosis: Aurelia's angina [K12.2]  Oral abscess [K12.2]        Interval Events:  "    Patient feeling \"a little worn out.\" Less pain in neck. Denies f/c. Looking forward to leaving hospital, no new concerns.     CRP and WBC have been down-trending. No fevers.    Abscess cultures now growing Strep anginosus, Strep intermedium, Actinomyces, and yeast (still pending speciation).     Antimicrobials:  Current:  - Micafungin (start 1/3)  - Zosyn (start 12/31)         Review of Systems:   5-system ROS completed in detail. Pertinent findings as above.          Allergies:     Allergies   Allergen Reactions     Ceftriaxone Anaphylaxis     Per Care Everywhere, patient has tolerated penicillins previously.     Codeine Nausea and Vomiting and Other (See Comments)     Nauseous and itchiness (says \"it still works); PN Reaction Type: Allergy; PN Noted: 20060912  Nauseous and itchiness (says \"it still works); PN Reaction Type: Allergy; PN Noted: 20060912       Cephalexin Other (See Comments) and Rash     Skin slothing/peeling per pt. Has tolerated penicillins.  Skin slothing/peeling per pt. Has tolerated penicillins.              Physical Exam:   Vitals were reviewed  Patient Vitals for the past 24 hrs:   BP Temp Temp src Pulse Resp SpO2 Weight   01/04/21 0659 (!) 157/98 97.8  F (36.6  C) Oral 93 20 93 % --   01/03/21 2206 (!) 142/94 97.1  F (36.2  C) Axillary 92 19 93 % --   01/03/21 1745 -- -- -- -- -- -- 78.9 kg (174 lb)   01/03/21 1536 113/75 98.4  F (36.9  C) Axillary 87 18 94 % --       Physical Examination:  GENERAL:  Awake, alert, oriented and sitting in chair. Pleasant and conversant.  HEENT:  Head is normocephalic, atraumatic   EYES:  Eyes have anicteric sclerae without conjunctival injection or stigmata of endocarditis.    ENT:  Oropharynx is moist. S/p dental extractions. Penrose drain in place.  NECK:  Supple. +penrose drain x2 left and midline anterior neck.  LUNGS:  Diffuse expiratory wheezing with diminished bases bilaterally.   CARDIOVASCULAR:  RRR, +S1/S2, no murmur appreciated  ABDOMEN:  soft, " nontender, nondistended, hypoactive bowel sounds.   SKIN:  No acute rashes. Scabs on anterior/radial aspect of left wrist, no web space lesions visible. No stigmata of endocarditis.  EXTREMITIES: Warm, no mottling or edema. +firm, quarter sized swelling just below mid-shin on right leg with no overlying erythema, ecchymosis, or tenderness to palpation  NEUROLOGIC:  Grossly nonfocal. Active x4 extremities         Laboratory Data:     Inflammatory Markers    Recent Labs   Lab Test 01/04/21  0547 01/03/21  0610 01/02/21  0639 01/01/21  0438 12/31/20  0536 12/30/20  2112   SED 84* 41* 77* 73* 62* 59*   CRP 32.0* 39.0* 61.0* 130.0* 210.0* 190.0*       Hematology Studies    Recent Labs   Lab Test 01/02/21  0639 01/01/21  0438 12/30/20  2112   WBC 8.2 11.3* 13.2*   ANEU  --   --  9.8*   AEOS  --   --  0.4   HGB 10.8* 10.3* 12.1*   MCV 84 85 84   * 482* 418       Metabolic Studies     Recent Labs   Lab Test 01/04/21  0547 01/03/21  0610 01/02/21  0639 01/01/21  0438 12/31/20  0536    140 141 138 137   POTASSIUM 3.4 3.6 3.2* 3.4 3.4   CHLORIDE 109 109 108 104 104   CO2 20 21 25 26 25   BUN 11 14 18 22 11   CR 1.17 1.18 1.22 1.00 0.89   GFRESTIMATED 69 69 66 84 >90       Hepatic Studies  No lab results found.    Microbiology:  Culture Micro   Date Value Ref Range Status   12/30/2020 (A)  Preliminary    Light growth  Actinomyces species  Identification obtained by MALDI-TOF mass spectrometry research use only database. Test   characteristics determined and verified by the Infectious Diseases Diagnostic Laboratory   ([facility]) [place], [place].  Susceptibility testing not routinely done     12/30/2020 Light growth  Mixed respiratory roc   (A)  Preliminary   12/30/2020 Yeast isolated (A)  Preliminary   12/30/2020 Light growth  Normal oral roc    Final   12/30/2020 (A)  Final    Light growth  Streptococcus anginosus  This organism is susceptible to ampicillin, penicillin, vancomycin and the cephalosporins.   If  treatment is required AND your patient is allergic to penicillin, contact the   Microbiology Lab within 5 days to request susceptibility testing.  Testing unavailable due to 's backorder.     12/30/2020 (A)  Final    Moderate growth  Streptococcus intermedius  This organism is susceptible to ampicillin, penicillin, vancomycin and the cephalosporins.   If treatment is required AND your patient is allergic to penicillin, contact the   Microbiology Lab within 5 days to request susceptibility testing.  Testing unavailable due to 's backorder.     12/30/2020 Culture negative monitoring continues  Preliminary   12/30/2020 Culture negative after 1 day  Preliminary   12/30/2020 No growth after 5 days  Preliminary   12/30/2020 No growth after 5 days  Preliminary       Urine Studies  No lab results found.    Vancomycin Levels    Recent Labs   Lab Test 01/02/21  0842   VANCOMYCIN 23.2       Hepatitis B Testing No lab results found.  Hepatitis C Testing   No results found for: HCVAB, HQTG, HCGENO, HCPCR, HQTRNA, HEPRNA  Respiratory Virus Testing    No results found for: RS, FLUAG          Imaging:     CT soft tissue neck w/ contrast (12/30/20; via Care Everywhere)  1.  Extensively carious mandibular dentition with multifocal periapical abscesses as described above. With this, there is extensive osseous destruction throughout the adjacent mandibular genu, likely representing osteomyelitis.  2.  Extensive soft tissue swelling and inflammatory changes throughout the perimandibular and submental soft tissues, also extending into the left premaxillary soft tissues, left lateral face, and upper neck, with inflammatory thickening of the platysma   muscles, left greater than right. Multifocal enlarged, likely reactive lymph nodes throughout the submental and bilateral submandibular station, as well as in the upper neck bilaterally. 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  3.  Mild to moderate  biapical paraseptal pulmonary emphysema, right greater than left.

## 2021-01-04 NOTE — PROGRESS NOTES
ORAL & MAXILLOFACIAL SURGERY   PROGRESS NOTE  Porfirio Clarke,  MRN: [id number],  : 1965    ASSESSMENT:  55 year old male s/p I&D of left sublingual and submental abscesses via transoral and transcutaneous approaches, extraction of teeth #20, 21, 22, 23, 24, 25, 26, 27, 28, and 29, and biopsy of anterior mandible bone on 2020 progressing well. Patient may need future debridement of anterior mandible pending disease course.      We appreciate ID recommendations, we anticipate he will be on long term antibiotics per ID.      Recommendations:   Recommendations:  - Continue antibiotic/antifungal coverage per ID  - Head of bed elevated 45 degrees  - Dental soft diet  - Peridex BID  - Warm saline rinses after meals- patient packing food into intraoral incision site.   - Pain management per primary team- OK for Ibuprofen  - Encourage ambulation, if not then recommend pneumoboots for dvt prophylaxis.   - Warm compress extraorally prn comfort, do not use ice  - Penrose drains have now been removed. Fluffs to incision sites as needed, change as saturated. Submental incision has minimal drainage and submandibular incision has only mild drainage following drain removal.   - Once patient is discharged, he will follow up in OMFS clinic where we will obtain a panoramic x-ray of his jaw and continue to follow/determine if he needs an additional debridement.   - Ok from OMFS perspective to discharge to TCU with follow up in OMFS clinic, OMFS to call to coordinate.      Please contact the OMFS resident on-call with questions or concerns.     Discussed with chief resident and staff.     Srinath Leavitt DDS  Oral & Maxillofacial Surgery, PGY-1     ____________________________________        SUBJECTIVE:  Patient pleasant this morning, endorses some mild tenderness of the jaw and surgical site. Patient reports feeling much better today than he did a couple days ago.      PHYSICAL EXAM:   /78   Pulse 89   Temp 98.8  F  "(37.1  C) (Axillary)   Resp 15   Ht 1.676 m (5' 6\")   Wt 82.2 kg (181 lb 3.5 oz)   SpO2 96%   BMI 29.25 kg/m    GEN: WD/WN male, NAD  HEENT: Left-sided facial swelling extending roughly 15 mm past midline in submental region      Ears: External ears are moreno      Eyes: Pupils equal round and reactive to light, Extraocular eye movement intact      Nose: External alae are normal, there is no hemorrhage, septum midline, no septal hematoma, no crepitus/deviation of the nasal dorsum      Mouth:   Extraction sites of remaining mandibular dentition has mucofibrinous tissue which is consistent with healing, incision site is able to be peeled back slightly exposing some packed food, bilateral mandibular buccal vestibules are tender to palpation, no purulence noted to be draining intraorally, some mild food debris noted in incision site. Floor of mouth is tender on left. JEREMY: 35mm.  Tongue and uvula are midline and in anatomic position, no lateral pharyngeal swelling. Mucosal membranes are moist.   Neck: 1 penrose drain in left submandibular incision with very mild serosanguinous drainage, which was removed today. Both incision sites clean. Submental incision with minimal to no drainage, neck soft in submental area. Submandibular incision with mild serosanguinous drainage following drain removal.   NEURO: AAOx4, CN II-XII intact bilaterally  PSYCH: Appropriate mood and affect   LABS:   Reviewed  CRP Inflammation   Date Value Ref Range Status   01/04/2021 32.0 (H) 0.0 - 8.0 mg/L Final   CBC RESULTS:   Recent Labs   Lab Test 01/02/21  0639   WBC 8.2   RBC 4.11*   HGB 10.8*   HCT 34.3*   MCV 84   MCH 26.3*   MCHC 31.5   RDW 14.5   *         RADIOLOGY:  None new    "

## 2021-01-04 NOTE — PROGRESS NOTES
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     Today:  - Discontinue vancomycin  - Start micafungin (yeast in tissue culture)  - ID to weight in on actinomyces tx plan  - Defer PICC placement until final abx plan established  - Continue current pain management plant    Manny Angina   Mandibular osteomyelitis  Presented to [facility] ED on 12/30 for progressively worsening left sided facial swelling and pain with associated fevers, dysphagia, odynophagia. CT neck indicated multifocal periapical abscesses and findings consistent with Manny's angina. Emergently transferred to [facility] for operative repair with incision and drainage of bilateral submandibular, sublingual and submental abscesses by OMFS. Airway evaluated by ENT, so signs of airway compromise. Taken to OR on 12/30, successful drainage of multiple abscesses, teeth extraction, mandibular biopsy. Gram stain with GPCs. Mandibular bone culture with light growth Strep Anginosis. Submandibular abscess cx moderate growth Strep Intermedius. Tissue cultures with yeast and actinomyces.   - Await culture sensitivities   - Discontinue vanc (12/30-1/3), continue zosyn (12/30-**)  - Add micafungin (1/3-**)  - ID following for osteomyelitis   - OMFS following - all penrose drains removed  - Pain control: APAP TID scheduled, naproxen BID scheduled, dilaudid PRN  - Head of bed 45 degrees  - Mechanical soft diet  - Peridex BID  - Warm  saline rinses after meals  - Warm compresses extraorally for comfort   - Patient to follow up with OMFS clinic following discharge. OMFS clinic to call and schedule     Chronic Conditions PTA:   COPD: albuterol inhaler PTN  Bipolar disorder: PTA wellbutrin  Polysubstance abuse  TBI     Diet: Mechanical/Dental Soft Diet  Room Service  Snacks/Supplements Adult: Boost Shake; With Meals    Fluids: None  Lines: PIV  DVT Prophylaxis: Pneumatic Compression Devices  Mckay Catheter: not present  Code Status: Full Code           Disposition Plan   Expected discharge: 2-4 days, recommended to likely TCU vs home once antibiotic plan established.  Entered: Aftab Gastelum MD 01/04/2021, 7:02 AM     The patient's care was discussed with the Attending Physician, Dr. Loya, Patient.    Aftab Gastelum MD  [address]   Please see sign in/sign out for up to date coverage information  ______________________________________________________________________    Interval History   Patient seen for follow up of Manny's angina, mandibular osteomyelitis. POD5 from OMFS surgery (abcess drainage, teeth extraction, mandibular biopsy, etc). No acute events overnight. Pain regimen increased yesterday with adequate pain control. No new complaints. Remaining penrose drain removed by OMFS this morning. Good PO intake.     Data reviewed today: All imaging reviewed.    Physical Exam   Vital Signs: Temp: 97.8  F (36.6  C) Temp src: Oral BP: (!) 157/98 Pulse: 93   Resp: 20 SpO2: 93 % O2 Device: None (Room air)    Weight: 174 lbs 0 oz  General: Pt laying comfortably in bed, no acute distress  HEENT: neck incision dressings C/D/I, scant serosanguinous drainage on dressing, remaining penrose drain removed   Resp: Breathing comfortably on room air  Cardiac: RRR  Abdomen: Soft, non-tender  Ext: warm and dry    Data   Recent Labs   Lab 01/03/21  0610 01/02/21  0639 01/01/21  0438 12/31/20  0536  12/30/20  2112   WBC  --  8.2 11.3*  --  13.2*   HGB  --  10.8* 10.3*  --  12.1*   MCV  --  84 85  --  84   PLT  --  512* 482*  --  418   INR  --   --   --  1.30*  --     141 138 137 138   POTASSIUM 3.6 3.2* 3.4 3.4 3.3*   CHLORIDE 109 108 104 104 103   CO2 21 25 26 25 28   BUN 14 18 22 11 11   CR 1.18 1.22 1.00 0.89 0.93   ANIONGAP 10 9 8 9 7   SILVERIO 8.8 8.8 8.7 8.6 8.8   * 94 106* 115* 91     No results found for this or any previous visit (from the past 24 hour(s)).

## 2021-01-04 NOTE — PLAN OF CARE
"[id number] Tolerated IV antibiotics. No fever overnight. Albuterol inhaler used for expiratory wheezes with relief. Pain managed with ordered medications. Dressing changed once for left drain with small amount serosanguinous drainage. A/O x4. Stable on RA. Voiding spontaneously. Pt made a statement to nursing that he was \"feeling down and deals with depression\", when asked pt declined feeling suicidal or wanting to hurt anybody. Nursing provided empathic listening; frustrated and sad that a certain friend did not call him to see who he was doing. Pt played some of his personal music to cope. Extravasation present in rt arm; warm pack applied.   "

## 2021-01-05 ENCOUNTER — APPOINTMENT (OUTPATIENT)
Dept: GENERAL RADIOLOGY | Facility: CLINIC | Age: 56
DRG: 540 | End: 2021-01-05
Payer: MEDICARE

## 2021-01-05 LAB
ANION GAP SERPL CALCULATED.3IONS-SCNC: 7 MMOL/L (ref 3–14)
BACTERIA SPEC CULT: NO GROWTH
BACTERIA SPEC CULT: NO GROWTH
BUN SERPL-MCNC: 13 MG/DL (ref 7–30)
CALCIUM SERPL-MCNC: 9.1 MG/DL (ref 8.5–10.1)
CHLORIDE SERPL-SCNC: 109 MMOL/L (ref 94–109)
CO2 SERPL-SCNC: 24 MMOL/L (ref 20–32)
CREAT SERPL-MCNC: 1.06 MG/DL (ref 0.66–1.25)
CRP SERPL-MCNC: 20 MG/L (ref 0–8)
ERYTHROCYTE [DISTWIDTH] IN BLOOD BY AUTOMATED COUNT: 15.1 % (ref 10–15)
ERYTHROCYTE [SEDIMENTATION RATE] IN BLOOD BY WESTERGREN METHOD: 72 MM/H (ref 0–20)
GFR SERPL CREATININE-BSD FRML MDRD: 78 ML/MIN/{1.73_M2}
GLUCOSE SERPL-MCNC: 103 MG/DL (ref 70–99)
HCT VFR BLD AUTO: 35.1 % (ref 40–53)
HGB BLD-MCNC: 11.3 G/DL (ref 13.3–17.7)
MCH RBC QN AUTO: 26.9 PG (ref 26.5–33)
MCHC RBC AUTO-ENTMCNC: 32.2 G/DL (ref 31.5–36.5)
MCV RBC AUTO: 84 FL (ref 78–100)
PLATELET # BLD AUTO: 591 10E9/L (ref 150–450)
POTASSIUM SERPL-SCNC: 3.4 MMOL/L (ref 3.4–5.3)
RBC # BLD AUTO: 4.2 10E12/L (ref 4.4–5.9)
SODIUM SERPL-SCNC: 140 MMOL/L (ref 133–144)
SPECIMEN SOURCE: NORMAL
SPECIMEN SOURCE: NORMAL
WBC # BLD AUTO: 8.3 10E9/L (ref 4–11)

## 2021-01-05 PROCEDURE — 85652 RBC SED RATE AUTOMATED: CPT | Performed by: STUDENT IN AN ORGANIZED HEALTH CARE EDUCATION/TRAINING PROGRAM

## 2021-01-05 PROCEDURE — 36415 COLL VENOUS BLD VENIPUNCTURE: CPT | Performed by: STUDENT IN AN ORGANIZED HEALTH CARE EDUCATION/TRAINING PROGRAM

## 2021-01-05 PROCEDURE — 86140 C-REACTIVE PROTEIN: CPT | Performed by: STUDENT IN AN ORGANIZED HEALTH CARE EDUCATION/TRAINING PROGRAM

## 2021-01-05 PROCEDURE — 250N000009 HC RX 250: Performed by: STUDENT IN AN ORGANIZED HEALTH CARE EDUCATION/TRAINING PROGRAM

## 2021-01-05 PROCEDURE — 80048 BASIC METABOLIC PNL TOTAL CA: CPT | Performed by: STUDENT IN AN ORGANIZED HEALTH CARE EDUCATION/TRAINING PROGRAM

## 2021-01-05 PROCEDURE — 250N000013 HC RX MED GY IP 250 OP 250 PS 637: Performed by: HOSPITALIST

## 2021-01-05 PROCEDURE — 36568 INSJ PICC <5 YR W/O IMAGING: CPT

## 2021-01-05 PROCEDURE — 99233 SBSQ HOSP IP/OBS HIGH 50: CPT | Mod: GC | Performed by: INTERNAL MEDICINE

## 2021-01-05 PROCEDURE — 99232 SBSQ HOSP IP/OBS MODERATE 35: CPT | Mod: GC | Performed by: STUDENT IN AN ORGANIZED HEALTH CARE EDUCATION/TRAINING PROGRAM

## 2021-01-05 PROCEDURE — 272N000201 ZZ HC ADHESIVE SKIN CLOSURE, DERMABOND

## 2021-01-05 PROCEDURE — 120N000002 HC R&B MED SURG/OB UMMC

## 2021-01-05 PROCEDURE — 999N000065 XR CHEST 1 VW

## 2021-01-05 PROCEDURE — 272N000456 ZZ HC KIT, 4 FR SL BIOFLO OPEN ENDED PICC

## 2021-01-05 PROCEDURE — 272N000277 HC DEVICE 4FR SECURACATH

## 2021-01-05 PROCEDURE — 71045 X-RAY EXAM CHEST 1 VIEW: CPT | Mod: 26 | Performed by: RADIOLOGY

## 2021-01-05 PROCEDURE — 85027 COMPLETE CBC AUTOMATED: CPT | Performed by: STUDENT IN AN ORGANIZED HEALTH CARE EDUCATION/TRAINING PROGRAM

## 2021-01-05 PROCEDURE — 250N000011 HC RX IP 250 OP 636: Performed by: STUDENT IN AN ORGANIZED HEALTH CARE EDUCATION/TRAINING PROGRAM

## 2021-01-05 PROCEDURE — 272N000103 HC INTRODUCER MICRO SET

## 2021-01-05 PROCEDURE — 250N000013 HC RX MED GY IP 250 OP 250 PS 637: Performed by: STUDENT IN AN ORGANIZED HEALTH CARE EDUCATION/TRAINING PROGRAM

## 2021-01-05 PROCEDURE — 258N000003 HC RX IP 258 OP 636: Performed by: STUDENT IN AN ORGANIZED HEALTH CARE EDUCATION/TRAINING PROGRAM

## 2021-01-05 RX ORDER — HEPARIN SODIUM,PORCINE 10 UNIT/ML
2-5 VIAL (ML) INTRAVENOUS
Status: ACTIVE | OUTPATIENT
Start: 2021-01-05 | End: 2021-01-08

## 2021-01-05 RX ORDER — LIDOCAINE 40 MG/G
CREAM TOPICAL
Status: ACTIVE | OUTPATIENT
Start: 2021-01-05 | End: 2021-01-08

## 2021-01-05 RX ADMIN — HYDROMORPHONE HYDROCHLORIDE 4 MG: 2 TABLET ORAL at 15:43

## 2021-01-05 RX ADMIN — HYDROMORPHONE HYDROCHLORIDE 4 MG: 2 TABLET ORAL at 19:31

## 2021-01-05 RX ADMIN — CHLORHEXIDINE GLUCONATE 0.12% ORAL RINSE 15 ML: 1.2 LIQUID ORAL at 19:31

## 2021-01-05 RX ADMIN — ACETAMINOPHEN 975 MG: 325 TABLET, FILM COATED ORAL at 08:04

## 2021-01-05 RX ADMIN — AMPICILLIN SODIUM AND SULBACTAM SODIUM 3 G: 2; 1 INJECTION, POWDER, FOR SOLUTION INTRAMUSCULAR; INTRAVENOUS at 12:49

## 2021-01-05 RX ADMIN — MICAFUNGIN SODIUM 100 MG: 50 INJECTION, POWDER, LYOPHILIZED, FOR SOLUTION INTRAVENOUS at 21:44

## 2021-01-05 RX ADMIN — AMPICILLIN SODIUM AND SULBACTAM SODIUM 3 G: 2; 1 INJECTION, POWDER, FOR SOLUTION INTRAMUSCULAR; INTRAVENOUS at 18:23

## 2021-01-05 RX ADMIN — BUPROPION HYDROCHLORIDE 450 MG: 150 TABLET, FILM COATED, EXTENDED RELEASE ORAL at 08:05

## 2021-01-05 RX ADMIN — LIDOCAINE HYDROCHLORIDE ANHYDROUS 5 ML: 10 INJECTION, SOLUTION INFILTRATION at 10:26

## 2021-01-05 RX ADMIN — CHLORHEXIDINE GLUCONATE 0.12% ORAL RINSE 15 ML: 1.2 LIQUID ORAL at 11:06

## 2021-01-05 RX ADMIN — AMPICILLIN SODIUM AND SULBACTAM SODIUM 3 G: 2; 1 INJECTION, POWDER, FOR SOLUTION INTRAMUSCULAR; INTRAVENOUS at 01:15

## 2021-01-05 RX ADMIN — NAPROXEN 500 MG: 500 TABLET ORAL at 17:06

## 2021-01-05 RX ADMIN — HYDROMORPHONE HYDROCHLORIDE 4 MG: 2 TABLET ORAL at 11:23

## 2021-01-05 RX ADMIN — AMPICILLIN SODIUM AND SULBACTAM SODIUM 3 G: 2; 1 INJECTION, POWDER, FOR SOLUTION INTRAMUSCULAR; INTRAVENOUS at 06:44

## 2021-01-05 RX ADMIN — ACETAMINOPHEN 975 MG: 325 TABLET, FILM COATED ORAL at 19:30

## 2021-01-05 RX ADMIN — NAPROXEN 500 MG: 500 TABLET ORAL at 08:05

## 2021-01-05 RX ADMIN — HYDROMORPHONE HYDROCHLORIDE 4 MG: 2 TABLET ORAL at 06:39

## 2021-01-05 RX ADMIN — HYDROMORPHONE HYDROCHLORIDE 4 MG: 2 TABLET ORAL at 02:39

## 2021-01-05 RX ADMIN — ACETAMINOPHEN 975 MG: 325 TABLET, FILM COATED ORAL at 14:36

## 2021-01-05 ASSESSMENT — ACTIVITIES OF DAILY LIVING (ADL)
ADLS_ACUITY_SCORE: 16

## 2021-01-05 ASSESSMENT — MIFFLIN-ST. JEOR: SCORE: 1555.66
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
## 2021-01-05 NOTE — PROGRESS NOTES
"ORAL & MAXILLOFACIAL SURGERY   PROGRESS NOTE  Porfirio Clarke,  MRN: [id number],  : 1965    ASSESSMENT:  55 year old male s/p I&D of left sublingual and submental abscesses via transoral and transcutaneous approaches, extraction of teeth #20, 21, 22, 23, 24, 25, 26, 27, 28, and 29, and biopsy of anterior mandible bone on 2020 progressing well. Patient may need future debridement of anterior mandible pending disease course.      We appreciate ID recommendations, we anticipate he will be on long term antibiotics per ID.      Recommendations:   Recommendations:  - Continue antibiotic/antifungal coverage per ID  - Dental soft diet  - Peridex BID  - Warm saline rinses after meals - patient packing food into intraoral incision site.  - Pain management per primary team- OK for Ibuprofen  - Encourage ambulation, if not then recommend pneumoboots for dvt prophylaxis.   - Warm compress extraorally prn comfort, do not use ice  - Penrose drains have now been removed. Fluffs to incision sites as needed, change as saturated. Minimal drainage from submental and submandibular incisions  - Once patient is discharged, he will follow up in OMFS clinic where we will obtain a panoramic x-ray of his jaw and continue to follow/determine if he needs an additional debridement.   - Ok from OMFS perspective to discharge to TCU with follow up in OMFS clinic, OMFS to call to coordinate.      Oral Surgery will follow peripherally. Please contact the OMFS resident on-call with questions or concerns.     Discussed with chief resident and staff.     Shruti Gonzalez DDS  Oral & Maxillofacial Surgery, PGY-2     ____________________________________        SUBJECTIVE:  Patient pleasant this morning, endorses some minimal tenderness intraorally. Continuing to improve. Reports eating and drinking without difficulty.      PHYSICAL EXAM:   /78   Pulse 89   Temp 98.8  F (37.1  C) (Axillary)   Resp 15   Ht 1.676 m (5' 6\")   Wt 82.2 " kg (181 lb 3.5 oz)   SpO2 96%   BMI 29.25 kg/m    GEN: WD/WN male, NAD  HEENT: Left-sided facial swelling extending roughly 15 mm past midline in submental region      Ears: External ears are moreno      Eyes: Pupils equal round and reactive to light, Extraocular eye movement intact      Nose: External alae are normal, there is no hemorrhage, septum midline, no septal hematoma, no crepitus/deviation of the nasal dorsum      Mouth:   Extraction sites of remaining mandibular dentition has mucofibrinous tissue which is consistent with healing, incision site is able to be peeled back slightly exposing some packed food, bilateral mandibular buccal vestibules are tender to palpation, no purulence noted to be draining intraorally, some mild food debris noted in incision site. Floor of mouth is tender on left. JEREMY: >35mm.  Tongue and uvula are midline and in anatomic position, no lateral pharyngeal swelling. Mucosal membranes are moist. Left V3 altered sensation.  Neck: Both incision sites clean. Submental and submandibular incisions with minimal to no drainage, neck soft.  NEURO: AAOx4, CN II-XII intact bilaterally  PSYCH: Appropriate mood and affect   LABS:   Reviewed  CRP Inflammation   Date Value Ref Range Status   01/05/2021 20.0 (H) 0.0 - 8.0 mg/L Final   CBC RESULTS:   Recent Labs   Lab Test 01/02/21  0639   WBC 8.2   RBC 4.11*   HGB 10.8*   HCT 34.3*   MCV 84   MCH 26.3*   MCHC 31.5   RDW 14.5   *         RADIOLOGY:  None new

## 2021-01-05 NOTE — PROGRESS NOTES
[facility]     Progress Note - Marfred 4 Service        Date of Admission:  12/30/2020    Assessment & Plan           Porfirio Clarke is a 55 year old male with a PMHx of COPD, TBI, bipolar disorder, polysubstance abuse, and homelessness who was admitted on 12/30/2020 as an emergent transfer from [facility] per OMFS for progressively worsening left-sided facial swelling and pain with CT neck findings concerning for Manny's angina and mandibular osteomyelitis, s/p abscess drainage, teeth extraction, mandibular biopsy by OMFS on 12/30.     Today:  - Continue micafungin  - Zosyn switched to unasyn   - PICC placement ordered  - SW to work on TCU placement    Manny Angina   Mandibular osteomyelitis  Presented to [facility] ED on 12/30 for progressively worsening left sided facial swelling and pain with associated fevers, dysphagia, odynophagia. CT neck indicated multifocal periapical abscesses and findings consistent with Manny's angina. Emergently transferred to [facility] for operative repair with incision and drainage of bilateral submandibular, sublingual and submental abscesses by OMFS. Airway evaluated by ENT, so signs of airway compromise. Taken to OR on 12/30, successful drainage of multiple abscesses, teeth extraction, mandibular biopsy. Gram stain with GPCs. Mandibular bone culture with light growth Strep Anginosis. Submandibular abscess cx moderate growth Strep Intermedius. Tissue cultures with yeast and actinomyces.   - Antibiotics:   - Vanc (12/30-1/3)   - Zosyn (12/30-1/4)   - Micafungin (1/3-**)   - Unasyn (1/4-**)  - ID following for osteomyelitis   - OMFS following - all penrose drains removed. Will need OMFS clinic follow up after discharge. OMFS clinic to call and schedule  - Pain control: APAP TID scheduled, naproxen BID scheduled, dilaudid PRN  - Head of bed 45 degrees   - Mechanical soft diet  - Peridex BID  - Warm saline rinses after meals  - Warm  compresses extraorally for comfort      Chronic Conditions PTA:   COPD: albuterol inhaler PTN  Bipolar disorder: PTA wellbutrin  Polysubstance abuse  TBI     Diet: Mechanical/Dental Soft Diet  Room Service  Snacks/Supplements Adult: Boost Shake; With Meals    Fluids: None  Lines: PIV  DVT Prophylaxis: Pneumatic Compression Devices  Mckay Catheter: not present  Code Status: Full Code           Disposition Plan   Expected discharge: 2-4 days, recommended to likely TCU vs home once antibiotic plan established.  Entered: Aftab Gastelum MD 01/05/2021, 12:37 PM     The patient's care was discussed with the Attending Physician, Dr. Stevens, Patient.    Aftab Gastelum MD  [address]   Please see sign in/sign out for up to date coverage information  ______________________________________________________________________    Interval History   Patient seen for follow up of Manny's angina, mandibular osteomyelitis. POD6 from OMFS surgery (abcess drainage, teeth extraction, mandibular biopsy, etc). No acute events overnight. Was really hoping to go home on PO abx, but agreeable to TCU for prolonged IV abx. No new complaints.     Data reviewed today: All imaging reviewed.    Physical Exam   Vital Signs: Temp: 96.1  F (35.6  C) Temp src: Axillary BP: 132/85 Pulse: 68   Resp: 16 SpO2: 97 % O2 Device: None (Room air)    Weight: 171 lbs 15.34 oz  General: Pt laying comfortably in bed, no acute distress  HEENT: neck incisions clean, dry without surrounding erythema or warmth, mild left facial swelling   Resp: Breathing comfortably on room air  Cardiac: RRR  Abdomen: Soft, non-tender  Ext: warm and dry    Data   Recent Labs   Lab 01/05/21  0435 01/04/21  0547 01/03/21  0610 01/02/21  0639 01/01/21  0438 12/31/20  0536   WBC 8.3  --   --  8.2 11.3*  --    HGB 11.3*  --   --  10.8* 10.3*  --    MCV 84  --   --  84 85  --    *  --   --  512* 482*  --    INR  --   --    --   --   --  1.30*    140 140 141 138 137   POTASSIUM 3.4 3.4 3.6 3.2* 3.4 3.4   CHLORIDE 109 109 109 108 104 104   CO2 24 20 21 25 26 25   BUN 13 11 14 18 22 11   CR 1.06 1.17 1.18 1.22 1.00 0.89   ANIONGAP 7 10 10 9 8 9   SILVERIO 9.1 9.1 8.8 8.8 8.7 8.6   * 100* 118* 94 106* 115*     Recent Results (from the past 24 hour(s))   XR Chest 1 View    Narrative    EXAM: XR CHEST 1 VW  1/5/2021 10:36 AM      HISTORY: picc placement    COMPARISON: None.    FINDINGS: Single view of the chest. Left upper extremity PICC  terminates in the mid/low SVC. Trachea is midline. Cardiomediastinal  silhouette and pulmonary vascular within normal limits. Bibasilar  atelectasis. No pleural effusion or pneumothorax.      Impression    IMPRESSION:  1. Left upper extremity PICC terminates in the mid/low SVC.  2. Bibasilar atelectasis.

## 2021-01-05 NOTE — PLAN OF CARE
"/85 (BP Location: Right arm)   Pulse 97   Temp 97.1  F (36.2  C) (Oral)   Resp 20   Ht 1.676 m (5' 6\")   Wt 78.9 kg (174 lb)   SpO2 95%   BMI 28.08 kg/m      Time: 3050-4119  R. of admission/Status:POD# 5 s/p incision and drainage abscess left submandibular, submental and sublingual and teeth extraction.    Neuro: A&Ox4, numbness to left side of jaw (surgical site since surgery), swelling getting better per pt.   Activity: independent in room.   Pain: c/o surgical site pain, Dilaudid 4 mg po x 2 this shift with some relief per pt.   Cardiac: WNL  Respiratory: sating >92% on room air, expiratory wheezing right lung, BURRIS. Denied coughing. Deep breathing and coughing encouraged.   GI/: last bm today, refused bedtime bowel meds, active bowel sound. Voided twice in the toilet.   Diet: mechanical soft diet, good appetite and denied N/V.   Skin: No new skin deficit. Extravasation site to right arm getting better and pt denied pain.   LDAs: PIV infusing tko.   Labs/Imaging: reviewed.   New change this shift: none   Plan: continue on iv abx, monitor extravasation site. Contact Kim 4 for any changes.     "

## 2021-01-05 NOTE — PROGRESS NOTES
[facility] PROGRESS NOTE     Patient:  Porfirio Clarke   Date of birth 1965, Medical record number [id number]  Date of Visit:  01/05/2021  Date of Admission: 12/30/2020  Consult Requester:Gaston Loya MD          Assessment and Recommendations:   ASSESSMENT:  1. 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 Manny's angina  3. Social: Polysubstance use, housing insecurity    DISCUSSION:   Porfirio Clarke is a 55 year old male who presented to [facility] ED with facial swelling on 12/30/20, transferred to [facility] on the same day after CT revealed mandibular osteomyelitis and submandibular changes consistent with Manny's angina. He was taken to OR on 12/30/20 by OMFS for extraction of 10 teeth, I&D of abscess, and biopsy of anterior mandible. Abscess fluid and tissue from mandible bone sent for cultures- growing Strep anginosus, Strep intermedius, Actinomyces, Candida dublinensis, and light growth of mixed respiratory roc. Blood cultures remaining NGTD. Inflammatory markers downtrending and patient clinically improved.     Recommend treatment with IV antibiotics for at least 2 weeks in the setting of actinomyces, with a prolonged oral course thereafter. In the setting of polymicrobial infection, would keep amp-sulbactam at this time which is also excellent coverage for the Strep species which grew in culture. Timing of the transition to oral antibiotics will be decided in the outpatient ID clinic.      Can transition micafungin to oral fluconazole at this time for Candida dublinesis growth from bone cultures as this Candida species is almost universally fluconazole sensitive. Since Candida is not felt to be the primary agent driving his osteomyelitis (Actinomyces and/or Strep more likely) a standard 6-12 month course for fungal osteomyelitis may not be required. Would treat for at least 6-8 weeks and determine  "ultimate duration in the outpatient setting.     RECOMMENDATION:  1. Continue ampicillin-sulbactam (3g, IV, q6 hours) to complete at least 2 more weeks of IV actinomyces-directed therapy (through at least 1/20/21). Patient will then need to transition to an oral agent such as amoxicillin-clavulanate to complete a prolonged course of several months for the actinomyces mandibular osteomyelitis.   2. We will arrange for ID outpatient follow-up in 1-2 weeks to determine the exact timing of the transition to oral antibiotics.   3. Please check at least weekly CBC and CMP while patient is on IV antibiotics.   4. Switch micafungin to PO fluconazole (400mg, PO, daily). Duration to be determined in the outpatient setting.  5. Please check EKG for QTc. Although QT prolongation is unlikely in this patient without significant cardiac history, this should be checked prior to discharging on prolonged fluconazole.     Thank you for this consult. ID will sign off at this time, please call back with any additional questions.     Rusty Tabares MD  ID Fellow, PGY-5  [phone]  ______________________________________________________________    Consult Question: manny angina, concern for osteo.  Admission Diagnosis: Manny's angina [K12.2]  Oral abscess [K12.2]        Interval Events:     Patient feeling \"Tired today. I didn't sleep well.\" Less pain in neck/ jaw. Penrose drain removed. Denies f/c. Looking forward to leaving hospital, no new concerns.     CRP down-trending. WBC normalized. No fevers.    Abscess cultures now growing Strep anginosus, Strep intermedius, Actinomyces, and Candida dublinensis.     Antimicrobials:  Current:  - Micafungin (start 1/3)  - Amp/sulb (start 1/4)         Review of Systems:   5-system ROS completed in detail. Pertinent findings as above.          Allergies:     Allergies   Allergen Reactions     Ceftriaxone Anaphylaxis     Per Care Everywhere, patient has tolerated penicillins previously. " "    Codeine Nausea and Vomiting and Other (See Comments)     Nauseous and itchiness (says \"it still works); PN Reaction Type: Allergy; PN Noted: 20060912  Nauseous and itchiness (says \"it still works); PN Reaction Type: Allergy; PN Noted: 20060912       Cephalexin Other (See Comments) and Rash     Skin slothing/peeling per pt. Has tolerated penicillins.  Skin slothing/peeling per pt. Has tolerated penicillins.              Physical Exam:   Vitals were reviewed  Patient Vitals for the past 24 hrs:   BP Temp Temp src Pulse Resp SpO2 Weight   01/05/21 1610 -- -- -- -- -- -- 78.3 kg (172 lb 9.6 oz)   01/05/21 1429 115/71 96.5  F (35.8  C) Oral 75 16 98 % --   01/05/21 0617 132/85 96.1  F (35.6  C) Axillary 68 16 97 % --   01/04/21 2300 (!) 146/86 97.9  F (36.6  C) Axillary 95 18 96 % 78 kg (171 lb 15.3 oz)       Physical Examination:  GENERAL:  Awake, alert, oriented lying in bed. A bit fatigued-appearing today. Pleasant and conversant.  HEENT:  Head is normocephalic, atraumatic   EYES:  Eyes have anicteric sclerae without conjunctival injection or stigmata of endocarditis.    ENT:  Oropharynx is clear. S/p dental extractions.   NECK:  Supple. Penrose drain no longer present. Moderate swelling still present, improved from prior.  LUNGS:  Diffuse mild expiratory wheezing. Otherwise CTA.   CARDIOVASCULAR:  RRR, +S1/S2, no murmur appreciated  ABDOMEN:  soft, nontender, nondistended, hypoactive bowel sounds.   SKIN:  No acute rashes. Scabs on anterior/radial aspect of left wrist, no web space lesions visible. No stigmata of endocarditis.  EXTREMITIES: Warm, no mottling or edema.  NEUROLOGIC:  Grossly nonfocal. Active x4 extremities         Laboratory Data:     Inflammatory Markers    Recent Labs   Lab Test 01/05/21  0435 01/04/21  0547 01/03/21  0610 01/02/21  0639 01/01/21  0438 12/31/20  0536 12/30/20  2112   SED 72* 84* 41* 77* 73* 62* 59*   CRP 20.0* 32.0* 39.0* 61.0* 130.0* 210.0* 190.0*       Hematology Studies  "   Recent Labs   Lab Test 01/05/21  0435 01/02/21  0639 01/01/21  0438 12/30/20  2112   WBC 8.3 8.2 11.3* 13.2*   ANEU  --   --   --  9.8*   AEOS  --   --   --  0.4   HGB 11.3* 10.8* 10.3* 12.1*   MCV 84 84 85 84   * 512* 482* 418       Metabolic Studies     Recent Labs   Lab Test 01/05/21  0435 01/04/21  0547 01/03/21  0610 01/02/21  0639 01/01/21  0438    140 140 141 138   POTASSIUM 3.4 3.4 3.6 3.2* 3.4   CHLORIDE 109 109 109 108 104   CO2 24 20 21 25 26   BUN 13 11 14 18 22   CR 1.06 1.17 1.18 1.22 1.00   GFRESTIMATED 78 69 69 66 84       Hepatic Studies  No lab results found.    Microbiology:  Culture Micro   Date Value Ref Range Status   12/30/2020 (A)  Preliminary    Light growth  Actinomyces species  Identification obtained by MALDI-TOF mass spectrometry research use only database. Test   characteristics determined and verified by the Infectious Diseases Diagnostic Laboratory   ([facility]) [place], [place].  Susceptibility testing not routinely done     12/30/2020 Light growth  Mixed respiratory roc   (A)  Preliminary   12/30/2020 Shelly dubliniensis  isolated   (A)  Preliminary   12/30/2020 Light growth  Normal oral roc    Final   12/30/2020 (A)  Final    Light growth  Streptococcus anginosus  This organism is susceptible to ampicillin, penicillin, vancomycin and the cephalosporins.   If treatment is required AND your patient is allergic to penicillin, contact the   Microbiology Lab within 5 days to request susceptibility testing.  Testing unavailable due to 's backorder.     12/30/2020 (A)  Final    Moderate growth  Streptococcus intermedius  This organism is susceptible to ampicillin, penicillin, vancomycin and the cephalosporins.   If treatment is required AND your patient is allergic to penicillin, contact the   Microbiology Lab within 5 days to request susceptibility testing.  Testing unavailable due to 's backorder.     12/30/2020 Culture negative monitoring continues   Preliminary   12/30/2020 Culture negative after 4 days  Preliminary   12/30/2020 No growth  Final   12/30/2020 No growth  Final       Urine Studies  No lab results found.    Vancomycin Levels    Recent Labs   Lab Test 01/02/21  0842   VANCOMYCIN 23.2       Hepatitis B Testing No lab results found.  Hepatitis C Testing   No results found for: HCVAB, HQTG, HCGENO, HCPCR, HQTRNA, HEPRNA  Respiratory Virus Testing    No results found for: RS, FLUAG          Imaging:     CT soft tissue neck w/ contrast (12/30/20; via Care Everywhere)  1.  Extensively carious mandibular dentition with multifocal periapical abscesses as described above. With this, there is extensive osseous destruction throughout the adjacent mandibular genu, likely representing osteomyelitis.  2.  Extensive soft tissue swelling and inflammatory changes throughout the perimandibular and submental soft tissues, also extending into the left premaxillary soft tissues, left lateral face, and upper neck, with inflammatory thickening of the platysma   muscles, left greater than right. Multifocal enlarged, likely reactive lymph nodes throughout the submental and bilateral submandibular station, as well as in the upper neck bilaterally. 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  3.  Mild to moderate biapical paraseptal pulmonary emphysema, right greater than left.

## 2021-01-05 NOTE — PROCEDURES
[facility]     Single Lumen PICC Placement    Date/Time: 1/5/2021 11:17 AM  Performed by: Hugh Diehl RN  Authorized by: Evelia Stevens MD     UNIVERSAL PROTOCOL   Site Marked: Yes  Prior Images Obtained and Reviewed:  Yes  Required items: Required blood products, implants, devices and special equipment available    Patient identity confirmed:  Verbally with patient, arm band and hospital-assigned identification number  NA - No sedation, light sedation, or local anesthesia  Confirmation Checklist:  Patient's identity using two indicators, relevant allergies, procedure was appropriate and matched the consent or emergent situation and correct equipment/implants were available  Time out: Immediately prior to the procedure a time out was called    Universal Protocol: the Joint Commission Universal Protocol was followed    Preparation: Patient was prepped and draped in usual sterile fashion           ANESTHESIA    Anesthesia: Local infiltration  Local Anesthetic:  Lidocaine 1% without epinephrine  Anesthetic Total (mL):  5      SEDATION    Patient Sedated: No        Preparation: skin prepped with ChloraPrep  Skin prep agent: skin prep agent completely dried prior to procedure  Sterile barriers: maximum sterile barriers were used: cap, mask, sterile gown, sterile gloves, and large sterile sheet  Hand hygiene: hand hygiene performed prior to central venous catheter insertion  Type of line used: PICC and Power PICC  Catheter type: single lumen  Lumen type: non-valved  Catheter size: 4 Fr  Brand: Bard  Lot number: [id number]  Placement method: venipuncture, MST, ultrasound and tip confirmation system  Number of attempts: 1  Successful placement: yes  Orientation: left  Location: brachial vein (lateral) (vd 0.61 cm)  Arm circumference: adults 10 cm  Extremity circumference: 29  Visible catheter length: 3  Total catheter length: 48  Dressing and securement:  blood cleaned with CHG, chlorhexidine disc applied, dressing applied, glue, line secured, securement device, site cleaned and sterile dressing applied  Post procedure assessment: blood return through all ports, free fluid flow and placement verified by x-ray  PROCEDURE   Patient Tolerance:  Patient tolerated the procedure well with no immediate complications  Describe Procedure: PICC ok to use

## 2021-01-05 NOTE — PLAN OF CARE
"Assumed cares 0700 - 1530    /71 (BP Location: Left arm)   Pulse 75   Temp 96.5  F (35.8  C) (Oral)   Resp 16   Ht 1.676 m (5' 6\")   Wt 78 kg (171 lb 15.3 oz)   SpO2 98%   BMI 27.75 kg/m      A&Ox4. VSS on RA. Pt reported pain in face. Dilaudid given - pt reported some relief. Independent in room. Mechanical/soft diet - good appetite. L facial swelling. R PIV extravasation. L PIV removed and L PICC placed. Plan to discharge to TCU with PICC to continue IV antibiotics. Will continue to monitor and update MD with any changes.      Jenifer Avendaño RN on 1/5/2021 at 2:46 PM    "

## 2021-01-05 NOTE — PLAN OF CARE
2223-3822 No major events overnight. Tolerated IV antibiotics. VSS. Left face swelling present; appear more swollen then compared to last night. Incisions intact with minimal serosanguinous drainage. Plan to have PICC place today. Some pain relief given with ordered medications; pain is described as burning with some numbness.

## 2021-01-06 LAB
ALBUMIN SERPL-MCNC: 2.4 G/DL (ref 3.4–5)
ALP SERPL-CCNC: 95 U/L (ref 40–150)
ALT SERPL W P-5'-P-CCNC: 30 U/L (ref 0–70)
AST SERPL W P-5'-P-CCNC: 20 U/L (ref 0–45)
BILIRUB DIRECT SERPL-MCNC: <0.1 MG/DL (ref 0–0.2)
BILIRUB SERPL-MCNC: 0.2 MG/DL (ref 0.2–1.3)
PROT SERPL-MCNC: 6.3 G/DL (ref 6.8–8.8)

## 2021-01-06 PROCEDURE — 120N000002 HC R&B MED SURG/OB UMMC

## 2021-01-06 PROCEDURE — 93010 ELECTROCARDIOGRAM REPORT: CPT | Performed by: INTERNAL MEDICINE

## 2021-01-06 PROCEDURE — 250N000013 HC RX MED GY IP 250 OP 250 PS 637: Performed by: STUDENT IN AN ORGANIZED HEALTH CARE EDUCATION/TRAINING PROGRAM

## 2021-01-06 PROCEDURE — 250N000011 HC RX IP 250 OP 636: Performed by: STUDENT IN AN ORGANIZED HEALTH CARE EDUCATION/TRAINING PROGRAM

## 2021-01-06 PROCEDURE — 80076 HEPATIC FUNCTION PANEL: CPT | Performed by: STUDENT IN AN ORGANIZED HEALTH CARE EDUCATION/TRAINING PROGRAM

## 2021-01-06 PROCEDURE — 36415 COLL VENOUS BLD VENIPUNCTURE: CPT | Performed by: STUDENT IN AN ORGANIZED HEALTH CARE EDUCATION/TRAINING PROGRAM

## 2021-01-06 PROCEDURE — 99232 SBSQ HOSP IP/OBS MODERATE 35: CPT | Mod: GC | Performed by: STUDENT IN AN ORGANIZED HEALTH CARE EDUCATION/TRAINING PROGRAM

## 2021-01-06 PROCEDURE — 93005 ELECTROCARDIOGRAM TRACING: CPT

## 2021-01-06 PROCEDURE — 250N000013 HC RX MED GY IP 250 OP 250 PS 637: Performed by: HOSPITALIST

## 2021-01-06 RX ORDER — FLUCONAZOLE 100 MG/1
400 TABLET ORAL DAILY
Status: DISCONTINUED | OUTPATIENT
Start: 2021-01-06 | End: 2021-01-10 | Stop reason: HOSPADM

## 2021-01-06 RX ORDER — HYDROMORPHONE HYDROCHLORIDE 2 MG/1
2-4 TABLET ORAL EVERY 6 HOURS PRN
Status: DISCONTINUED | OUTPATIENT
Start: 2021-01-06 | End: 2021-01-10 | Stop reason: HOSPADM

## 2021-01-06 RX ORDER — HYDROMORPHONE HYDROCHLORIDE 2 MG/1
2-4 TABLET ORAL EVERY 8 HOURS PRN
Status: DISCONTINUED | OUTPATIENT
Start: 2021-01-06 | End: 2021-01-06

## 2021-01-06 RX ADMIN — AMPICILLIN SODIUM AND SULBACTAM SODIUM 3 G: 2; 1 INJECTION, POWDER, FOR SOLUTION INTRAMUSCULAR; INTRAVENOUS at 06:08

## 2021-01-06 RX ADMIN — HYDROMORPHONE HYDROCHLORIDE 4 MG: 2 TABLET ORAL at 00:40

## 2021-01-06 RX ADMIN — HYDROMORPHONE HYDROCHLORIDE 4 MG: 2 TABLET ORAL at 22:13

## 2021-01-06 RX ADMIN — AMPICILLIN SODIUM AND SULBACTAM SODIUM 3 G: 2; 1 INJECTION, POWDER, FOR SOLUTION INTRAMUSCULAR; INTRAVENOUS at 00:41

## 2021-01-06 RX ADMIN — FLUCONAZOLE 400 MG: 100 TABLET ORAL at 12:33

## 2021-01-06 RX ADMIN — CHLORHEXIDINE GLUCONATE 0.12% ORAL RINSE 15 ML: 1.2 LIQUID ORAL at 19:32

## 2021-01-06 RX ADMIN — HYDROMORPHONE HYDROCHLORIDE 4 MG: 2 TABLET ORAL at 04:43

## 2021-01-06 RX ADMIN — AMPICILLIN SODIUM AND SULBACTAM SODIUM 3 G: 2; 1 INJECTION, POWDER, FOR SOLUTION INTRAMUSCULAR; INTRAVENOUS at 19:26

## 2021-01-06 RX ADMIN — CHLORHEXIDINE GLUCONATE 0.12% ORAL RINSE 15 ML: 1.2 LIQUID ORAL at 08:05

## 2021-01-06 RX ADMIN — ACETAMINOPHEN 975 MG: 325 TABLET, FILM COATED ORAL at 08:04

## 2021-01-06 RX ADMIN — BUPROPION HYDROCHLORIDE 450 MG: 150 TABLET, FILM COATED, EXTENDED RELEASE ORAL at 08:04

## 2021-01-06 RX ADMIN — NAPROXEN 500 MG: 500 TABLET ORAL at 17:26

## 2021-01-06 RX ADMIN — NAPROXEN 500 MG: 500 TABLET ORAL at 08:04

## 2021-01-06 RX ADMIN — HYDROMORPHONE HYDROCHLORIDE 4 MG: 2 TABLET ORAL at 14:43

## 2021-01-06 RX ADMIN — AMPICILLIN SODIUM AND SULBACTAM SODIUM 3 G: 2; 1 INJECTION, POWDER, FOR SOLUTION INTRAMUSCULAR; INTRAVENOUS at 13:53

## 2021-01-06 RX ADMIN — ACETAMINOPHEN 975 MG: 325 TABLET, FILM COATED ORAL at 14:43

## 2021-01-06 RX ADMIN — ACETAMINOPHEN 975 MG: 325 TABLET, FILM COATED ORAL at 19:32

## 2021-01-06 ASSESSMENT — ACTIVITIES OF DAILY LIVING (ADL)
ADLS_ACUITY_SCORE: 16

## 2021-01-06 ASSESSMENT — MIFFLIN-ST. JEOR: SCORE: 1553.84

## 2021-01-06 NOTE — PLAN OF CARE
"Assumed cares 0700 - 1530    /82 (BP Location: Right arm)   Pulse 69   Temp 97  F (36.1  C) (Oral)   Resp 16   Ht 1.676 m (5' 6\")   Wt 78.3 kg (172 lb 9.6 oz)   SpO2 95%   BMI 27.86 kg/m      A&Ox4. VSS on RA. Pt reported pain in face. Dilaudid given x1. Independent in room. Mechanical/soft diet - good appetite. L facial swelling. R PIV extravasation. L PICC infusing ABX. Plan to discharge to TCU pending placement with PICC to continue IV antibiotics. Will continue to monitor and update MD with any changes.      Jenifer Avendaño RN on 1/6/2021 at 3:05 PM        "

## 2021-01-06 NOTE — PROGRESS NOTES
Care Management Follow Up    Length of Stay (days): 7    Expected Discharge Date: 01/08/21     Concerns to be Addressed:   Placement    Patient plan of care discussed at interdisciplinary rounds: Yes    Anticipated Discharge Disposition:  TBD    Patient/family educated on Medicare website which has current facility and service quality ratings:  Yes  Education Provided on the Discharge Plan:  Yes  Patient/Family in Agreement with the Plan:  Yes    Referrals Placed by CM/SURJIT:  See below  Private pay costs discussed: Not applicable    Additional Information:  SURJIT received call from natali Campbell for [facility], who informed pt is global denial for hx of drug use and criminal record.     SURJIT then called natali Epperson for [facility], and left VM explaining pt's situation, and faxed referral.     SURJIT later called [facility] at 3pm to f/u on referral. Zeenat stated she is waiting to hear back from two different nursing directors for final answer, and will update SURJIT.       Tomer Ames, MSW, SW  Acute Care Float   [facility]

## 2021-01-06 NOTE — PROGRESS NOTES
[facility]     Progress Note - Marfred 4 Service        Date of Admission:  12/30/2020    Assessment & Plan         Porfirio Clarke is a 55 year old male with a PMHx of COPD, TBI, bipolar disorder, polysubstance abuse, and homelessness who was admitted on 12/30/2020 as an emergent transfer from [facility] per OMFS for progressively worsening left-sided facial swelling and pain with CT neck findings concerning for Manny's angina and mandibular osteomyelitis, s/p abscess drainage, teeth extraction, mandibular biopsy by OMFS on 12/30.     Today:  - Obtaining EKG and baseline liver function tests, then plan to transition to PO fluconazole if acceptable  - Continue Unasyn  - Decreased dilaudid dosing frequency   - SW/CC working on placement    Manny Angina   Mandibular osteomyelitis  Presented to [facility] ED on 12/30 for progressively worsening left sided facial swelling and pain with associated fevers, dysphagia, odynophagia. CT neck indicated multifocal periapical abscesses and findings consistent with Manny's angina. Emergently transferred to [facility] for operative repair with incision and drainage of bilateral submandibular, sublingual and submental abscesses by OMFS. Airway evaluated by ENT, so signs of airway compromise. Taken to OR on 12/30, successful drainage of multiple abscesses, teeth extraction, mandibular biopsy. Gram stain with GPCs. Mandibular bone culture with light growth Strep Anginosis. Submandibular abscess cx moderate growth Strep Intermedius. Tissue cultures with yeast and actinomyces.   - Antibiotics:   - Vanc (12/30-1/3)   - Zosyn (12/30-1/4)   - Micafungin (1/3-1/6)   - Unasyn (1/4-**)   - Fluconazole (1/6-**)  - ID following for osteomyelitis   - OMFS following - all penrose drains removed. Will need OMFS clinic follow up after discharge. OMFS clinic to call and schedule  - Pain control: APAP TID scheduled, naproxen BID scheduled, dilaudid  PRN  - Head of bed 45 degrees   - Mechanical soft diet  - Peridex BID  - Warm saline rinses after meals  - Warm compresses extraorally for comfort     Malnutrition:    - Level of malnutrition: Non-Severe   - Based on: mild (or greater) subcutaneous fat loss, mild (or greater) muscle loss, mild (or greater) fluid retention     Chronic Conditions PTA:   COPD: albuterol inhaler PTN  Bipolar disorder: PTA wellbutrin  Polysubstance abuse  TBI     Diet: Mechanical/Dental Soft Diet  Room Service  Snacks/Supplements Adult: Boost Shake; With Meals    Fluids: None  Lines: PIV  DVT Prophylaxis: Pneumatic Compression Devices  Mckay Catheter: not present  Code Status: Full Code           Disposition Plan   Expected discharge: 1-3 days, recommended to likely TCU vs home once antibiotic plan established.  Entered: Aftab Gastelum MD 01/06/2021, 10:53 AM     The patient's care was discussed with the Attending Physician, Dr. Stevens, Patient.    Aftab Gastelum MD  [address]   Please see sign in/sign out for up to date coverage information  ______________________________________________________________________    Interval History   Patient seen for follow up of Manny's angina, mandibular osteomyelitis. POD7 from OMFS surgery (abcess drainage, teeth extraction, mandibular biopsy, etc). No acute events overnight. Aware of plan for TCU. Still having some pain. Did not sleep well last night, feels anxious. No other new complaints.     Data reviewed today: All imaging reviewed.    Physical Exam   Vital Signs: Temp: 95.8  F (35.4  C) Temp src: Oral BP: 128/83 Pulse: 73   Resp: 16 SpO2: 95 % O2 Device: None (Room air)    Weight: 172 lbs 9.6 oz  General: Pt laying comfortably in bed, no acute distress  HEENT: neck incisions clean, dry without surrounding erythema or warmth, mild left facial swelling   Resp: Breathing comfortably on room air, CTAB  Cardiac: RRR  Abdomen: Soft,  non-tender  Ext: warm and dry    Data   Recent Labs   Lab 01/05/21  0435 01/04/21  0547 01/03/21  0610 01/02/21  0639 01/01/21  0438 12/31/20  0536   WBC 8.3  --   --  8.2 11.3*  --    HGB 11.3*  --   --  10.8* 10.3*  --    MCV 84  --   --  84 85  --    *  --   --  512* 482*  --    INR  --   --   --   --   --  1.30*    140 140 141 138 137   POTASSIUM 3.4 3.4 3.6 3.2* 3.4 3.4   CHLORIDE 109 109 109 108 104 104   CO2 24 20 21 25 26 25   BUN 13 11 14 18 22 11   CR 1.06 1.17 1.18 1.22 1.00 0.89   ANIONGAP 7 10 10 9 8 9   SILVERIO 9.1 9.1 8.8 8.8 8.7 8.6   * 100* 118* 94 106* 115*     No results found for this or any previous visit (from the past 24 hour(s)).

## 2021-01-06 NOTE — PLAN OF CARE
Assumed cares 1739-1770. Pt A&Ox4, VSS on RA. Pt talkative and pleasant. Endorsed lower face/jaw pain at surgical site that is controlled by PRN Dilaudid. Surgical site WDL. Chlorhexidine mouthwash and  normal saline swish/spit performed after eating. Pt consumed 100% of mechanical soft diet. Abx & anti-fungal given via left PICC. Pt met with social work today. Plan to discharge to TCU due to need for continued abx. Currently awaiting placement options. Continue to monitor and update MD with changes.

## 2021-01-06 NOTE — PLAN OF CARE
Neuro: A&Ox4; WDL  GI: WDL; bowel sounds present x4; no BM this shift; denies nausea; mechanical soft diet  : voids spontaneously  Resp: expiratory wheezing in bases noted; WDL on RA; stated some dyspnea on exertion  Mobility: independent in room and repositions independently in bed  Cardiac: WDL  Skin: Pink R arm spot of vanco extravasation; some edema noted in face and extremities  Lines/Drains: L single lumen PICC TKO  Pain: states face pain decreased with PRN dilaudid x2  Behavior: calm; cooperative; pleasant  VS: VSS on RA    Plan of Care: Looking for TCU placement to continue abx treatment; continue pain management; continue cares and assessments per provider instruction; monitor for changes.

## 2021-01-07 LAB
BACTERIA SPEC CULT: ABNORMAL
BACTERIA SPEC CULT: ABNORMAL
INTERPRETATION ECG - MUSE: NORMAL
Lab: ABNORMAL
SPECIMEN SOURCE: ABNORMAL

## 2021-01-07 PROCEDURE — 250N000011 HC RX IP 250 OP 636: Performed by: STUDENT IN AN ORGANIZED HEALTH CARE EDUCATION/TRAINING PROGRAM

## 2021-01-07 PROCEDURE — 250N000013 HC RX MED GY IP 250 OP 250 PS 637: Performed by: STUDENT IN AN ORGANIZED HEALTH CARE EDUCATION/TRAINING PROGRAM

## 2021-01-07 PROCEDURE — 250N000013 HC RX MED GY IP 250 OP 250 PS 637: Performed by: HOSPITALIST

## 2021-01-07 PROCEDURE — 99232 SBSQ HOSP IP/OBS MODERATE 35: CPT | Mod: GC | Performed by: STUDENT IN AN ORGANIZED HEALTH CARE EDUCATION/TRAINING PROGRAM

## 2021-01-07 PROCEDURE — 120N000002 HC R&B MED SURG/OB UMMC

## 2021-01-07 RX ADMIN — ACETAMINOPHEN 975 MG: 325 TABLET, FILM COATED ORAL at 19:34

## 2021-01-07 RX ADMIN — AMPICILLIN SODIUM AND SULBACTAM SODIUM 3 G: 2; 1 INJECTION, POWDER, FOR SOLUTION INTRAMUSCULAR; INTRAVENOUS at 19:35

## 2021-01-07 RX ADMIN — ACETAMINOPHEN 975 MG: 325 TABLET, FILM COATED ORAL at 08:26

## 2021-01-07 RX ADMIN — HYDROMORPHONE HYDROCHLORIDE 4 MG: 2 TABLET ORAL at 19:34

## 2021-01-07 RX ADMIN — BUPROPION HYDROCHLORIDE 450 MG: 150 TABLET, FILM COATED, EXTENDED RELEASE ORAL at 08:27

## 2021-01-07 RX ADMIN — AMPICILLIN SODIUM AND SULBACTAM SODIUM 3 G: 2; 1 INJECTION, POWDER, FOR SOLUTION INTRAMUSCULAR; INTRAVENOUS at 06:13

## 2021-01-07 RX ADMIN — FLUCONAZOLE 400 MG: 100 TABLET ORAL at 08:27

## 2021-01-07 RX ADMIN — NAPROXEN 500 MG: 500 TABLET ORAL at 17:05

## 2021-01-07 RX ADMIN — ALBUTEROL SULFATE 2 PUFF: 90 AEROSOL, METERED RESPIRATORY (INHALATION) at 08:31

## 2021-01-07 RX ADMIN — AMPICILLIN SODIUM AND SULBACTAM SODIUM 3 G: 2; 1 INJECTION, POWDER, FOR SOLUTION INTRAMUSCULAR; INTRAVENOUS at 00:44

## 2021-01-07 RX ADMIN — CHLORHEXIDINE GLUCONATE 0.12% ORAL RINSE 15 ML: 1.2 LIQUID ORAL at 19:41

## 2021-01-07 RX ADMIN — NICOTINE 7 MG/24 HR DAILY TRANSDERMAL PATCH 1 PATCH: at 10:10

## 2021-01-07 RX ADMIN — AMPICILLIN SODIUM AND SULBACTAM SODIUM 3 G: 2; 1 INJECTION, POWDER, FOR SOLUTION INTRAMUSCULAR; INTRAVENOUS at 13:28

## 2021-01-07 RX ADMIN — CHLORHEXIDINE GLUCONATE 0.12% ORAL RINSE 15 ML: 1.2 LIQUID ORAL at 08:27

## 2021-01-07 RX ADMIN — ACETAMINOPHEN 975 MG: 325 TABLET, FILM COATED ORAL at 13:32

## 2021-01-07 RX ADMIN — HYDROMORPHONE HYDROCHLORIDE 4 MG: 2 TABLET ORAL at 13:32

## 2021-01-07 RX ADMIN — NAPROXEN 500 MG: 500 TABLET ORAL at 08:26

## 2021-01-07 RX ADMIN — HYDROMORPHONE HYDROCHLORIDE 4 MG: 2 TABLET ORAL at 04:37

## 2021-01-07 ASSESSMENT — ACTIVITIES OF DAILY LIVING (ADL)
ADLS_ACUITY_SCORE: 16

## 2021-01-07 ASSESSMENT — MIFFLIN-ST. JEOR: SCORE: 1554.75

## 2021-01-07 NOTE — PLAN OF CARE
Neuro: A&Ox4; WDL  GI: good appetite; WDL  : WDL  Resp: VSS on RA; denies SOB; wheezing noted in lower lobes  Mobility: independent in room and repositions independently  Cardiac: WDL  Skin: CDI  Lines/Drains: L PICC infusing abx  Pain: L sided jaw pain decreased with PRN dilaudid q6  Behavior: calm; cooperative; pleasant  VS: VSS on RA    Plan of Care: Awaiting TCU placement with antibiotic treatment; continue cares and assessments per provider instruction; monitor for changes.

## 2021-01-07 NOTE — PLAN OF CARE
Assumed cares 0502-0905. Pt A&Ox4, VSS on RA. Endorsed worsening left sided facial/jaw pain not adequately controlled by scheduled meds, PRN Dilaudid not available until end of shift. Provider paged, switched q8 Dilaudid back to q6. Mechanical soft diet, good appetite. Encouraged to swish/spit with NS after meals. Mouthwash given per MAR. Showered independently. Abx given via PICC. Awaiting TCU placement. Continue to monitor and update MD with changes.

## 2021-01-07 NOTE — PLAN OF CARE
"[facility] cares 0700 - 1530    /85 (BP Location: Right arm)   Pulse 79   Temp 96.4  F (35.8  C) (Oral)   Resp 16   Ht 1.676 m (5' 6\")   Wt 78.1 kg (172 lb 3.2 oz)   SpO2 95%   BMI 27.79 kg/m      A&Ox4. VSS on RA. Pt reported pain in face. Dilaudid given x1. Pt expressed feeling anxious about discharging to a TCU and where he will be placed. Independent in room. Mechanical/soft diet - good appetite. L facial swelling. R PIV extravasation. L PICC infusing TKO for intermittent ABX. Plan to discharge to TCU pending placement with PICC to continue IV antibiotics. Will continue to monitor and update MD with any changes.      Jenifer Avendaño RN on 1/7/2021 at 2:44 PM       "

## 2021-01-07 NOTE — PROGRESS NOTES
BRIEF SOCIAL WORK NOTE    SURJIT called [facility] liaison for [facility], at 3:10 PM to f/u on [facility] referral for [facility]. No answer, left VM to f/u on referral, requested return call.     SURJIT met with pt at bedside and provided update.      MIGUEL Lmab, [facility]  Acute Care Float   [facility]

## 2021-01-07 NOTE — PROVIDER NOTIFICATION
Provider notified (name): Royal Duke [id number]  Reason for notification: Pt is having increased pain on left side of face, Dilaudid not available until 2245.   Recommendation/request given to provider: Are you able to give him a one-time dose?  Response from provider: no response    Paged again:    Provider notified (name): Mady Mcnulty [id number]  Reason for notification: Pt is having increased pain on left side of face, Dilaudid not available until 2245.   Recommendation/request given to provider: order something for breakthrough pain   Response from provider: switched q8 Dilaudid back to q6

## 2021-01-07 NOTE — PROGRESS NOTES
[facility]     Progress Note - Marfred 4 Service        Date of Admission:  12/30/2020    Assessment & Plan         Porfirio Clarke is a 55 year old male with a PMHx of COPD, TBI, bipolar disorder, polysubstance abuse, and homelessness who was admitted on 12/30/2020 as an emergent transfer from [facility] per OMFS for progressively worsening left-sided facial swelling and pain with CT neck findings concerning for Manny's angina and mandibular osteomyelitis, s/p abscess drainage, teeth extraction, mandibular biopsy by OMFS on 12/30.     Today:  - Transitioned to PO fluconazole yesterday  - No new changes, awaiting placement    Manny Angina   Mandibular osteomyelitis  Presented to [facility] ED on 12/30 for progressively worsening left sided facial swelling and pain with associated fevers, dysphagia, odynophagia. CT neck indicated multifocal periapical abscesses and findings consistent with Manny's angina. Emergently transferred to [facility] for operative repair with incision and drainage of bilateral submandibular, sublingual and submental abscesses by OMFS. Airway evaluated by ENT, so signs of airway compromise. Taken to OR on 12/30, successful drainage of multiple abscesses, teeth extraction, mandibular biopsy. Gram stain with GPCs. Mandibular bone culture with light growth Strep Anginosis. Submandibular abscess cx moderate growth Strep Intermedius. Tissue cultures with yeast and actinomyces.   - Antibiotics:   - Vanc (12/30-1/3)   - Zosyn (12/30-1/4)   - Micafungin (1/3-1/6)   - Unasyn (1/4-**)   - Fluconazole (1/6-**)  - ID following for osteomyelitis   - OMFS following - all penrose drains removed. Will need OMFS clinic follow up after discharge. OMFS clinic to call and schedule  - Pain control: APAP TID scheduled, naproxen BID scheduled, dilaudid PRN  - Head of bed 45 degrees   - Mechanical soft diet  - Peridex BID  - Warm saline rinses after meals  - Warm  compresses extraorally for comfort     Malnutrition:    - Level of malnutrition: Non-Severe   - Based on: mild (or greater) subcutaneous fat loss, mild (or greater) muscle loss, mild (or greater) fluid retention     Chronic Conditions PTA:   COPD: albuterol inhaler PTN  Bipolar disorder: PTA wellbutrin  Polysubstance abuse  TBI     Diet: Mechanical/Dental Soft Diet  Room Service  Snacks/Supplements Adult: Boost Shake; With Meals    Fluids: None  Lines: PIV  DVT Prophylaxis: Ambulate  Mckay Catheter: not present  Code Status: Full Code           Disposition Plan   Expected discharge: 1-3 days, recommended to likely TCU vs home once antibiotic plan established.  Entered: Aftab Gastelum MD 01/07/2021, 7:00 AM     The patient's care was discussed with the Attending Physician, Dr. Stevens, Patient.    Aftab Gastelum MD  [address]   Please see sign in/sign out for up to date coverage information  ______________________________________________________________________    Interval History   Patient seen for follow up of Manny's angina, mandibular osteomyelitis. POD8 from OMFS surgery (abcess drainage, teeth extraction, mandibular biopsy, etc). No acute events overnight. Still having some pain, dilaudid frequency increased from Q8H PRN to Q6H PRN with relief. No new complaints.     Data reviewed today: All imaging reviewed.    Physical Exam   Vital Signs: Temp: 96.4  F (35.8  C) Temp src: Axillary BP: 123/85 Pulse: 79   Resp: 16 SpO2: 95 % O2 Device: None (Room air)    Weight: 172 lbs 3.2 oz  General: Pt laying comfortably in bed, no acute distress  HEENT: neck incisions clean, dry without surrounding erythema or warmth, mild left facial swelling   Resp: Breathing comfortably on room air, CTAB  Cardiac: RRR  Abdomen: Soft, non-tender  Ext: warm and dry    Data   Recent Labs   Lab 01/06/21  1001 01/05/21  0435 01/04/21  0547 01/03/21  0610 01/02/21  0639  01/01/21  0438   WBC  --  8.3  --   --  8.2 11.3*   HGB  --  11.3*  --   --  10.8* 10.3*   MCV  --  84  --   --  84 85   PLT  --  591*  --   --  512* 482*   NA  --  140 140 140 141 138   POTASSIUM  --  3.4 3.4 3.6 3.2* 3.4   CHLORIDE  --  109 109 109 108 104   CO2  --  24 20 21 25 26   BUN  --  13 11 14 18 22   CR  --  1.06 1.17 1.18 1.22 1.00   ANIONGAP  --  7 10 10 9 8   SILVERIO  --  9.1 9.1 8.8 8.8 8.7   GLC  --  103* 100* 118* 94 106*   ALBUMIN 2.4*  --   --   --   --   --    PROTTOTAL 6.3*  --   --   --   --   --    BILITOTAL 0.2  --   --   --   --   --    ALKPHOS 95  --   --   --   --   --    ALT 30  --   --   --   --   --    AST 20  --   --   --   --   --      No results found for this or any previous visit (from the past 24 hour(s)).

## 2021-01-07 NOTE — PHARMACY
[facility]  Parenteral ANtibiotic Review at Departure from Acute Care [facility]     Antimicrobial Stewardship Program - A joint venture between [facility] and  Physicians to optimize antibiotic management.  NOT a formal consult - Restricted Antimicrobial Review     Patient: Porfirio Clarke  MRN: [id number]  Allergies: Ceftriaxone, Codeine, and Cephalexin    Brief Summary: Porfirio Clarke is a 55 year old male with a PMHx significant for osteomyelitis and discitis of spine (2016), polysubstance abuse, bipolar disorder 1, and housing insecurity who was transferred to [facility] on  for further management of mandibular osteomyelitis and Manny's angina. He was taken to the OR on  for extraction of 10 teeth, I&D of abscess, and biopsy of anterior mandible. Abscess fluid and tissue from mandible with Strep intermedius, Strep anginosis, Actinomyces species, Shelly dubliniensis, and light growth of mixed respiratory roc. Patient was transitioned to Unasyn and oral fluconazole in preparation for discharge. Plan is to complete parenteral antibiotic therapy as outpatient followed by transitioning to a completely oral regimen for an extended duration.     Antimicrobial Dose/Route/Frequency Duration/Indication Start Date End Date   Ampicillin + Sulbactam 3 g/IV/every 6 hours At least 2 weeks/  osteomyelitis(polymicrobial; Manny's angina) 2020 TBD per ID provider   Fluconazole 400 mg/PO/every 24 hours TBD/osteomyelitis(polymicrobial; Manny's angina) 1/3/2021 TBD per ID provider     Laboratory Tests: CBC, CMP   Lab Monitoring Frequency: 1x week   Therapeutic Drug Monitoring: none     Therapeutic Drug Monitoring Frequency: none     Miscellaneous Drug Monitorin2021 QTc 429      Line Type: PICC(Single lumen, placed 2021)    Reassess Line/Pull Line Date: TBD per ID provider     First Dose Received in Controlled Setting:  Yes    Designated Provider: Kendall Chatterjee MD    Follow-up: Patient does  have ID follow-up. Appointment date/time: 1/20/2021 @ 1:00 PM.    Recommendations/Additional Information:   1. Please fax laboratory results to [facility] ([phone]), attn: Dr. Shena Westbrook, PharmD, BCIDP  Pager: [phone]    Vital Signs/Clinical Features:  Vitals         01/05 0700  -  01/06 0659 01/06 0700  -  01/07 0659 01/07 0700  -  01/07 0925   Most Recent    Temp ( F) 95.8 -  97.5    96.4 -  97       96.4 (35.8)    Pulse 72 -  75    69 -  79       79    Resp   16      16       16    /80 -  128/83    123/82 -  125/85       123/85    SpO2 (%) 95 -  98    94 -  95       95            Labs  Estimated Creatinine Clearance: 76.5 mL/min (based on SCr of 1.06 mg/dL).  Recent Labs   Lab Test 12/31/20 0536 01/01/21 0438 01/02/21 0639 01/03/21  0610 01/04/21  0547 01/05/21  0435   CR 0.89 1.00 1.22 1.18 1.17 1.06       Recent Labs   Lab Test 12/30/20 2112 01/01/21 0438 01/02/21 0639 01/05/21  0435   WBC 13.2* 11.3* 8.2 8.3   ANEU 9.8*  --   --   --    ALYM 1.4  --   --   --    THIERRY 1.5*  --   --   --    AEOS 0.4  --   --   --    HGB 12.1* 10.3* 10.8* 11.3*   HCT 37.7* 32.6* 34.3* 35.1*   MCV 84 85 84 84    482* 512* 591*       Recent Labs   Lab Test 01/06/21  1001   BILITOTAL 0.2   ALKPHOS 95   ALBUMIN 2.4*   AST 20   ALT 30       Recent Labs   Lab Test 12/31/20  0536 01/01/21 0438 01/02/21 0639 01/03/21  0610 01/04/21  0547 01/05/21  0435   .0* 130.0* 61.0* 39.0* 32.0* 20.0*   SED 62* 73* 77* 41* 84* 72*       Recent Labs   Lab Test 01/02/21  0842   VANCOMYCIN 23.2       Culture Results:  7-Day Micro Results       ** No results found for the last 168 hours. **                                    Imaging: Xr Chest 1 View    Result Date: 1/5/2021  EXAM: XR CHEST 1 VW  1/5/2021 10:36 AM  HISTORY: picc placement COMPARISON: None. FINDINGS: Single view of the chest. Left upper extremity PICC terminates in  the mid/low SVC. Trachea is midline. Cardiomediastinal silhouette and pulmonary vascular within normal limits. Bibasilar atelectasis. No pleural effusion or pneumothorax.     IMPRESSION: 1. Left upper extremity PICC terminates in the mid/low SVC. 2. Bibasilar atelectasis. I have personally reviewed the examination and initial interpretation and I agree with the findings. NORY PEOPLES MD

## 2021-01-08 LAB
BACTERIA SPEC CULT: ABNORMAL
Lab: ABNORMAL
SPECIMEN SOURCE: ABNORMAL

## 2021-01-08 PROCEDURE — 250N000013 HC RX MED GY IP 250 OP 250 PS 637: Performed by: STUDENT IN AN ORGANIZED HEALTH CARE EDUCATION/TRAINING PROGRAM

## 2021-01-08 PROCEDURE — 250N000013 HC RX MED GY IP 250 OP 250 PS 637: Performed by: HOSPITALIST

## 2021-01-08 PROCEDURE — 99232 SBSQ HOSP IP/OBS MODERATE 35: CPT | Mod: GC | Performed by: STUDENT IN AN ORGANIZED HEALTH CARE EDUCATION/TRAINING PROGRAM

## 2021-01-08 PROCEDURE — 250N000011 HC RX IP 250 OP 636: Performed by: STUDENT IN AN ORGANIZED HEALTH CARE EDUCATION/TRAINING PROGRAM

## 2021-01-08 PROCEDURE — 120N000002 HC R&B MED SURG/OB UMMC

## 2021-01-08 RX ADMIN — AMPICILLIN SODIUM AND SULBACTAM SODIUM 3 G: 2; 1 INJECTION, POWDER, FOR SOLUTION INTRAMUSCULAR; INTRAVENOUS at 19:00

## 2021-01-08 RX ADMIN — NAPROXEN 500 MG: 500 TABLET ORAL at 19:00

## 2021-01-08 RX ADMIN — ACETAMINOPHEN 975 MG: 325 TABLET, FILM COATED ORAL at 07:43

## 2021-01-08 RX ADMIN — HYDROMORPHONE HYDROCHLORIDE 2 MG: 2 TABLET ORAL at 01:32

## 2021-01-08 RX ADMIN — HYDROMORPHONE HYDROCHLORIDE 4 MG: 2 TABLET ORAL at 19:46

## 2021-01-08 RX ADMIN — NAPROXEN 500 MG: 500 TABLET ORAL at 07:44

## 2021-01-08 RX ADMIN — NICOTINE 7 MG/24 HR DAILY TRANSDERMAL PATCH 1 PATCH: at 07:45

## 2021-01-08 RX ADMIN — ACETAMINOPHEN 975 MG: 325 TABLET, FILM COATED ORAL at 13:30

## 2021-01-08 RX ADMIN — CHLORHEXIDINE GLUCONATE 0.12% ORAL RINSE 15 ML: 1.2 LIQUID ORAL at 07:43

## 2021-01-08 RX ADMIN — AMPICILLIN SODIUM AND SULBACTAM SODIUM 3 G: 2; 1 INJECTION, POWDER, FOR SOLUTION INTRAMUSCULAR; INTRAVENOUS at 06:48

## 2021-01-08 RX ADMIN — HYDROMORPHONE HYDROCHLORIDE 4 MG: 2 TABLET ORAL at 07:43

## 2021-01-08 RX ADMIN — BUPROPION HYDROCHLORIDE 450 MG: 150 TABLET, FILM COATED, EXTENDED RELEASE ORAL at 07:45

## 2021-01-08 RX ADMIN — FLUCONAZOLE 400 MG: 100 TABLET ORAL at 07:45

## 2021-01-08 RX ADMIN — HYDROMORPHONE HYDROCHLORIDE 4 MG: 2 TABLET ORAL at 13:33

## 2021-01-08 RX ADMIN — CHLORHEXIDINE GLUCONATE 0.12% ORAL RINSE 15 ML: 1.2 LIQUID ORAL at 19:46

## 2021-01-08 RX ADMIN — AMPICILLIN SODIUM AND SULBACTAM SODIUM 3 G: 2; 1 INJECTION, POWDER, FOR SOLUTION INTRAMUSCULAR; INTRAVENOUS at 13:30

## 2021-01-08 RX ADMIN — ACETAMINOPHEN 975 MG: 325 TABLET, FILM COATED ORAL at 19:46

## 2021-01-08 RX ADMIN — AMPICILLIN SODIUM AND SULBACTAM SODIUM 3 G: 2; 1 INJECTION, POWDER, FOR SOLUTION INTRAMUSCULAR; INTRAVENOUS at 01:20

## 2021-01-08 ASSESSMENT — MIFFLIN-ST. JEOR: SCORE: 1529.75

## 2021-01-08 ASSESSMENT — ACTIVITIES OF DAILY LIVING (ADL)
ADLS_ACUITY_SCORE: 14
ADLS_ACUITY_SCORE: 16
ADLS_ACUITY_SCORE: 14
ADLS_ACUITY_SCORE: 14

## 2021-01-08 NOTE — PLAN OF CARE
"Neuro: A&Ox4; numbness and tingling in extremities at baseline  GI: bowel sounds present x4; good appetite this shift; encouraged saline rinses; mechanical soft diet tolerated well  : WDL  Resp: WDL; VSS on RA  Mobility: independent in room and with repositioning  Cardiac: WDL  Skin: L sided face/L jaw edema noted; open area on jawline with serous to serosanguinous drainage; open to air per pt preference  Lines/Drains: L single lumen PICC infusing abx  Pain: pt states \"the dilaudid is covering my jaw pain well\"; PRN 4mg given x1  Behavior: calm, cooperative, pleasant  VS: VSS on RA    Plan of Care: Awaiting TCU placement; continue cares and assessments per provider instruction; monitor for changes.   "

## 2021-01-08 NOTE — PROGRESS NOTES
"Care Management Follow Up    Length of Stay (days): 9    Expected Discharge Date: 01/09/21(TCU)     Concerns to be Addressed: 2 IV abx at discharge, TCU placement     Patient plan of care discussed at interdisciplinary rounds: Yes    Anticipated Discharge Disposition:  TCU TBD    Patient/family educated on Medicare website which has current facility and service quality ratings: Yes   Education Provided on the Discharge Plan:  Yes  Patient/Family in Agreement with the Plan:  Yes    Referrals Placed by CM/SW:  See below  Private pay costs discussed: Not applicable    Additional Information:  Pt medically ready for discharge. Pt has TCU recommendations d/t requiring two IV abx's until at least 1/20/21. Pt is homeless with hx of polysubstance abuse (no hx of IV drug use indicated).     SURJIT called [facility] liaison for [facility]; Zeenat informed SW that pt is global denial for [facility] TCU's.    SW informed pt. Pt understandably frustrated; pt stated he \"feels like he's in detention, but a nice one.\" Pt discussed discontinuing IV abx and requested to speak with MD about this. SW relayed this request to MD.     Pt agreeable with SW sending referrals to the following TCU's. Pt's discharge disposition status is as follows:    PENDING REFERRALS     [facility] ([facility])  [address] [address]  P: [phone]  01/08/21 12:48PM SW sent referral    [facility] (SNF)  [address] [facility] [address]  P: [phone]  01/08/21 12:48PM SW sent referral    [facility] ([facility])  [address] [address]  [phone]  01/08/21 12:48PM SW sent referral        SW will continue to follow pt for discharge planning. SW/Weekend SW will f/u on referrals with TCU's listed above.       MIGUEL Lamb, LGSW  Acute Care Float   [facility]       "

## 2021-01-08 NOTE — PLAN OF CARE
"BP (!) 141/100 (BP Location: Right arm)   Pulse 95   Temp 97.1  F (36.2  C) (Axillary)   Resp 18   Ht 1.676 m (5' 6\")   Wt 78.2 kg (172 lb 6.4 oz)   SpO2 92%   BMI 27.83 kg/m      Care from: [id number]      VS & Pain: VSS ex hypertensive, on room air, prn Dilaudid x2 and scheduled Tylenol were given for jaw pain    Neuro: A&O x4    Respiratory: fine crackles in LLL, coarse lung sounds in R lobes     Cardiac: WDL    Peripheral neurovascular: baseline numbness and tingling in all extremities    GI/: adequate urine output and no BM this shift    Nutrition: good appetite and oral intake, denied nausea    Skin: bruised, L PICC, and medial and L mandible old drain sites- cleansed site with NS, REJI per MD, small serosanguinous drainage    Musculoskeletal: WDL    Lines: L PICC is TKO with NS     Activity: Up independently, ambulated in lewis x2    Events this shift: Call light within reach.    Plan: Continue to monitor and follow poc. Awaiting for placement.  "

## 2021-01-08 NOTE — PLAN OF CARE
Assumed cares [id number]. Pt A&Ox4, VSS on RA. Endorsed left sided facial/jaw pain partially controlled by PRN Dilaudid. Pt c/o slightly increased drainage from left lower jaw surgical site. Declined dressing to this area. Sutures to lower gums largely gone this shift. Pt diligently performed saline rinses throughout shift. HOB at 45 degrees. Mech soft diet, good appetite. Abx given via PICC. Awaiting TCU placement. Continue to monitor and update MD with changes.

## 2021-01-09 VITALS
WEIGHT: 166.89 LBS | RESPIRATION RATE: 18 BRPM | TEMPERATURE: 97.9 F | OXYGEN SATURATION: 97 % | SYSTOLIC BLOOD PRESSURE: 138 MMHG | BODY MASS INDEX: 26.82 KG/M2 | DIASTOLIC BLOOD PRESSURE: 103 MMHG | HEIGHT: 66 IN | HEART RATE: 91 BPM

## 2021-01-09 PROCEDURE — 250N000011 HC RX IP 250 OP 636: Performed by: STUDENT IN AN ORGANIZED HEALTH CARE EDUCATION/TRAINING PROGRAM

## 2021-01-09 PROCEDURE — 250N000013 HC RX MED GY IP 250 OP 250 PS 637: Performed by: HOSPITALIST

## 2021-01-09 PROCEDURE — 99232 SBSQ HOSP IP/OBS MODERATE 35: CPT | Mod: GC | Performed by: STUDENT IN AN ORGANIZED HEALTH CARE EDUCATION/TRAINING PROGRAM

## 2021-01-09 PROCEDURE — 250N000013 HC RX MED GY IP 250 OP 250 PS 637: Performed by: STUDENT IN AN ORGANIZED HEALTH CARE EDUCATION/TRAINING PROGRAM

## 2021-01-09 RX ADMIN — ACETAMINOPHEN 975 MG: 325 TABLET, FILM COATED ORAL at 07:59

## 2021-01-09 RX ADMIN — AMPICILLIN SODIUM AND SULBACTAM SODIUM 3 G: 2; 1 INJECTION, POWDER, FOR SOLUTION INTRAMUSCULAR; INTRAVENOUS at 01:51

## 2021-01-09 RX ADMIN — NAPROXEN 500 MG: 500 TABLET ORAL at 07:58

## 2021-01-09 RX ADMIN — AMPICILLIN SODIUM AND SULBACTAM SODIUM 3 G: 2; 1 INJECTION, POWDER, FOR SOLUTION INTRAMUSCULAR; INTRAVENOUS at 07:58

## 2021-01-09 RX ADMIN — CHLORHEXIDINE GLUCONATE 0.12% ORAL RINSE 15 ML: 1.2 LIQUID ORAL at 07:58

## 2021-01-09 RX ADMIN — FLUCONAZOLE 400 MG: 100 TABLET ORAL at 07:59

## 2021-01-09 RX ADMIN — NICOTINE 7 MG/24 HR DAILY TRANSDERMAL PATCH 1 PATCH: at 08:02

## 2021-01-09 RX ADMIN — HYDROMORPHONE HYDROCHLORIDE 4 MG: 2 TABLET ORAL at 14:28

## 2021-01-09 RX ADMIN — AMPICILLIN SODIUM AND SULBACTAM SODIUM 3 G: 2; 1 INJECTION, POWDER, FOR SOLUTION INTRAMUSCULAR; INTRAVENOUS at 14:28

## 2021-01-09 RX ADMIN — HYDROMORPHONE HYDROCHLORIDE 4 MG: 2 TABLET ORAL at 07:59

## 2021-01-09 RX ADMIN — BUPROPION HYDROCHLORIDE 450 MG: 150 TABLET, FILM COATED, EXTENDED RELEASE ORAL at 07:59

## 2021-01-09 RX ADMIN — ACETAMINOPHEN 975 MG: 325 TABLET, FILM COATED ORAL at 14:28

## 2021-01-09 RX ADMIN — HYDROMORPHONE HYDROCHLORIDE 4 MG: 2 TABLET ORAL at 01:50

## 2021-01-09 ASSESSMENT — ACTIVITIES OF DAILY LIVING (ADL)
ADLS_ACUITY_SCORE: 14

## 2021-01-09 NOTE — PROGRESS NOTES
[facility]     Progress Note - Marfred 4 Service        Date of Admission:  12/30/2020    Assessment & Plan         Porfirio Clarke is a 55 year old male with a PMHx of COPD, TBI, bipolar disorder, polysubstance abuse, and homelessness who was admitted on 12/30/2020 as an emergent transfer from [facility] per OMFS for progressively worsening left-sided facial swelling and pain with CT neck findings concerning for Manny's angina and mandibular osteomyelitis, s/p abscess drainage, teeth extraction, mandibular biopsy by OMFS on 12/30.     Today:  - No new changes, awaiting placement    Manny Angina   Mandibular osteomyelitis  Presented to [facility] ED on 12/30 for progressively worsening left sided facial swelling and pain with associated fevers, dysphagia, odynophagia. CT neck indicated multifocal periapical abscesses and findings consistent with Manny's angina. Emergently transferred to [facility] for operative repair with incision and drainage of bilateral submandibular, sublingual and submental abscesses by OMFS. Airway evaluated by ENT, so signs of airway compromise. Taken to OR on 12/30, successful drainage of multiple abscesses, teeth extraction, mandibular biopsy. Gram stain with GPCs. Mandibular bone culture with light growth Strep Anginosis. Submandibular abscess cx moderate growth Strep Intermedius. Tissue cultures with yeast and actinomyces.   - Antibiotics:   - Vanc (12/30-1/3)   - Zosyn (12/30-1/4)   - Micafungin (1/3-1/6)   - Unasyn (1/4-**)   - Fluconazole (1/6-**)  - ID following for osteomyelitis   - OMFS following - all penrose drains removed. Will need OMFS clinic follow up after discharge. OMFS clinic to call and schedule  - Pain control: APAP TID scheduled, naproxen BID scheduled, dilaudid PRN  - Head of bed 45 degrees   - Mechanical soft diet  - Peridex BID  - Warm saline rinses after meals  - Warm compresses extraorally for comfort     Malnutrition:     - Level of malnutrition: Non-Severe   - Based on: mild (or greater) subcutaneous fat loss, mild (or greater) muscle loss, mild (or greater) fluid retention     Chronic Conditions PTA:   COPD: albuterol inhaler PTN  Bipolar disorder: PTA wellbutrin  Polysubstance abuse  TBI     Diet: Mechanical/Dental Soft Diet  Room Service  Snacks/Supplements Adult: Boost Shake; With Meals    Fluids: None  Lines: PIV  DVT Prophylaxis: Ambulate  Mckay Catheter: not present  Code Status: Full Code           Disposition Plan   Expected discharge: 1-3 days, recommended to likely TCU vs home once antibiotic plan established.  Entered: Evelia Stevens MD 01/08/2021, 7:47 PM     The patient's care was discussed with the Attending Physician, Dr. Stevens, Patient.    Evelia Stevens MD  [address]   Please see sign in/sign out for up to date coverage information  ______________________________________________________________________    Interval History   NAEON. Placement denied in [facility]. Frustrated today, feels like he is imprisoned as not allowed to leave the floor. Notes anxiety and wishing he could have abx set up out of the hospital, or PO. Discussed possible alternatives to staying in the hospital. Denies pain other than mouth pain.     Data reviewed today: All imaging reviewed.    Physical Exam   Vital Signs: Temp: 96.8  F (36  C) Temp src: Oral BP: (!) 150/97 Pulse: 84   Resp: 18 SpO2: 92 % O2 Device: None (Room air)    Weight: 172 lbs 6.4 oz  General: Pt laying comfortably in bed, no acute distress  HEENT: neck incisions clean, dry without surrounding erythema or warmth, mild left facial swelling   Resp: Breathing comfortably on room air, CTAB  Cardiac: RRR  Abdomen: Soft, non-tender  Ext: warm and dry    Data   Recent Labs   Lab 01/06/21  1001 01/05/21  0435 01/04/21  0547 01/03/21  0610 01/02/21  0639   WBC  --  8.3  --   --  8.2   HGB  --  11.3*  --    --  10.8*   MCV  --  84  --   --  84   PLT  --  591*  --   --  512*   NA  --  140 140 140 141   POTASSIUM  --  3.4 3.4 3.6 3.2*   CHLORIDE  --  109 109 109 108   CO2  --  24 20 21 25   BUN  --  13 11 14 18   CR  --  1.06 1.17 1.18 1.22   ANIONGAP  --  7 10 10 9   SILVERIO  --  9.1 9.1 8.8 8.8   GLC  --  103* 100* 118* 94   ALBUMIN 2.4*  --   --   --   --    PROTTOTAL 6.3*  --   --   --   --    BILITOTAL 0.2  --   --   --   --    ALKPHOS 95  --   --   --   --    ALT 30  --   --   --   --    AST 20  --   --   --   --      No results found for this or any previous visit (from the past 24 hour(s)).

## 2021-01-09 NOTE — PROGRESS NOTES
[facility]     Progress Note - Kim 4 Service        Date of Admission:  12/30/2020    Assessment & Plan         Porfirio Clarke is a 55 year old male with a PMHx of COPD, TBI, bipolar disorder, polysubstance abuse, and homelessness who was admitted on 12/30/2020 as an emergent transfer from [facility] per OMFS for progressively worsening left-sided facial swelling and pain with CT neck findings concerning for Manny's angina and mandibular osteomyelitis, s/p abscess drainage, teeth extraction, mandibular biopsy by OMFS on 12/30.     Today:  - No new changes, awaiting placement  - Discussed at length SI that was mentioned overnight; patient states he has no history of previous suicide attempts, does not have a plan, and does not have active SI this morning when evaluated. He feels depressed for multiple reasons, both acute related to his infection and hospitalization as well as chronic related to life stressors, but has no plan and declines psychiatry consult.  - Patient likely to leave AMA over the next 24 hours, will touch base with ID about best oral regimen to order when patient decides to leave     Manny Angina   Mandibular osteomyelitis  Presented to [facility] ED on 12/30 for progressively worsening left sided facial swelling and pain with associated fevers, dysphagia, odynophagia. CT neck indicated multifocal periapical abscesses and findings consistent with Manny's angina. Emergently transferred to [facility] for operative repair with incision and drainage of bilateral submandibular, sublingual and submental abscesses by OMFS. Airway evaluated by ENT, so signs of airway compromise. Taken to OR on 12/30, successful drainage of multiple abscesses, teeth extraction, mandibular biopsy. Gram stain with GPCs. Mandibular bone culture with light growth Strep Anginosis. Submandibular abscess cx moderate growth Strep Intermedius. Tissue cultures with yeast and  "actinomyces.   - Antibiotics:   - Vanc (12/30-1/3)   - Zosyn (12/30-1/4)   - Micafungin (1/3-1/6)   - Unasyn (1/4-**)   - Fluconazole (1/6-**)  - ID following for osteomyelitis   - OMFS following - all penrose drains removed. Will need OMFS clinic follow up after discharge. OMFS clinic to call and schedule  - Pain control: APAP TID scheduled, naproxen BID scheduled, dilaudid PRN  - Head of bed 45 degrees   - Mechanical soft diet  - Peridex BID  - Warm saline rinses after meals  - Warm compresses extraorally for comfort     Malnutrition:    - Level of malnutrition: Non-Severe   - Based on: mild (or greater) subcutaneous fat loss, mild (or greater) muscle loss, mild (or greater) fluid retention     Chronic Conditions PTA:   COPD: albuterol inhaler PTN  Bipolar disorder: PTA wellbutrin  Polysubstance abuse  TBI     Diet: Mechanical/Dental Soft Diet  Room Service  Snacks/Supplements Adult: Boost Shake; With Meals    Fluids: None  Lines: PIV  DVT Prophylaxis: Ambulate  Mckay Catheter: not present  Code Status: Full Code           Disposition Plan   Expected discharge: 1-3 days, recommended to likely TCU vs home once antibiotic plan established.  Entered: Aftab Gastelum MD 01/09/2021, 12:06 PM     The patient's care was discussed with the Attending Physician, Dr. Stevens, Patient.    Aftab Gastelum MD  [address]   Please see sign in/sign out for up to date coverage information  ______________________________________________________________________    Interval History   Patient seen for follow up of Manny's angina, mandibular osteomyelitis. POD8 from OMFS surgery (abcess drainage, teeth extraction, mandibular biopsy, etc). No acute events overnight. Patient states he is getting \"stir crazy\" sitting in his hospital room. Feels he cannot stay here much longer. Understands that it is difficult to find placement due to his criminal record, and is frustrated at " the fact that insurance will not cover home infusion. States he will likely leave at some point, and would like oral abx if possible.     Data reviewed today: All imaging reviewed.    Physical Exam   Vital Signs: Temp: 95.8  F (35.4  C) Temp src: Oral BP: (!) 165/113 Pulse: 94   Resp: 18 SpO2: 97 % O2 Device: None (Room air)    Weight: 166 lbs 14.21 oz  General: NAD, sitting up at bedside dressed in street clothes   HEENT: EOMI, mild left facial swelling   Resp: Breathing comfortably on room air, CTAB  Cardiac: RRR  Abdomen: Soft, non-tender  Ext: warm and dry    Data   Recent Labs   Lab 01/06/21  1001 01/05/21  0435 01/04/21  0547 01/03/21  0610   WBC  --  8.3  --   --    HGB  --  11.3*  --   --    MCV  --  84  --   --    PLT  --  591*  --   --    NA  --  140 140 140   POTASSIUM  --  3.4 3.4 3.6   CHLORIDE  --  109 109 109   CO2  --  24 20 21   BUN  --  13 11 14   CR  --  1.06 1.17 1.18   ANIONGAP  --  7 10 10   SILVERIO  --  9.1 9.1 8.8   GLC  --  103* 100* 118*   ALBUMIN 2.4*  --   --   --    PROTTOTAL 6.3*  --   --   --    BILITOTAL 0.2  --   --   --    ALKPHOS 95  --   --   --    ALT 30  --   --   --    AST 20  --   --   --      No results found for this or any previous visit (from the past 24 hour(s)).

## 2021-01-09 NOTE — PROGRESS NOTES
"Care Management Follow Up    Length of Stay (days): 10    Expected Discharge Date: 01/11/21     Concerns to be Addressed:   2IV abx at discharge; TCU placement follow up  Patient plan of care discussed at interdisciplinary rounds: No    Anticipated Discharge Disposition: Transitional Care TBD     Anticipated Discharge Services:  TBD  Anticipated Discharge DME:   TBD    Patient/family educated on Medicare website which has current facility and service quality ratings:  yes  Education Provided on the Discharge Plan:  Not at this encounter  Patient/Family in Agreement with the Plan:      Referrals Placed by CM/SW:    PENDING REFERRALS      [facility]  [address] [address]  P: [phone]  01/08/21 12:48PM SW sent referral  01/09/21 10:09AM SW sent referral again    01/09/21: SW spoke with Admissions and they stated they had not received the referral. SW faxed referral again to [phone].     [facility] ([facility])  [address] [facility] [address]  P: [phone]  01/08/21 12:48PM SW sent referral    01/09/21: Left VM with admissions at 9:49AM     [facility] ([facility])  [address] [place] [address]  [phone]  01/08/21 12:48PM SW sent referral    01/09/21:  SW called and was informed that  don't work the weekend and was transferred to admissions. VM left.     Private pay costs discussed: Not applicable    Additional Information:  Please see follow up notes above.  SW followed up with referrals sent yesterday.     Per RN notes from 01/09 patient reports feeling very depressed and having suicidal thoughts. \"Pt has plans but states \"it can't be done in the hospital.\" RN notified provider. Patient will most likely have psych consult.      SW will continue to follow up with referrals and follow for discharge.    Radha Tobin, MIGUEL, LGSW  01/09/21  Social Work Services/Care Management, Casual " staff  [facility]      For weekend social work needs, contact information below and avail on [employer]:  4A, 4C, 4E, 5A, 5B pager [phone]  6A, 6B, 6C, 6D  pager [phone]  7A, 7B, 7C, 7D, 5C pager [phone]  on-call/after hours pager [phone]    Weekend RN care coordinator pager [phone] (ID: [phone])  (home d/c with needs incl home care, assisted living facility returns, durable medical equip, IV antibiotics)

## 2021-01-09 NOTE — PROVIDER NOTIFICATION
"Provider notified (name): Aftab Gastelum  Reason for notification: \"Pt reports feeling very depressed. States he has a plan, but will not complete it in the hospital.\"  Recommendation/request given to provider: Psych consult?  Response from provider: Will pass along to day RN  "

## 2021-01-09 NOTE — PROVIDER NOTIFICATION
"Provider notified (name): Viet Schuster (Kim Crosscover)  Reason for notification: \"Pt's blood pressure is elevated--166/113.\"  Recommendation/request given to provider: \"Do you want any interventions?\"  Response from provider: Will pass along to day team.   "

## 2021-01-09 NOTE — PLAN OF CARE
"Assumed Cares: [id number]  Neuro: A&Ox4.   GI: No BM this shift.   : Voiding adequately and spontaneously in toilet.   Respiratory: Denies SOB.  Cardiac: Denies chest pain. Hypertensive. Provider aware.   Skin: Intact. No concerns.   Lines/Drains: PICC infusing IV abx per orders.   Mobility: Independent.   Behavior: Calm/cooperative.   Pain: Reports pain in face. Received PO dilaudid.   Vital signs:  VSS on RA. HTN.   BP (!) 165/113 (BP Location: Right arm)   Pulse 94   Temp 95.8  F (35.4  C) (Oral)   Resp 18   Ht 1.676 m (5' 6\")   Wt 75.7 kg (166 lb 14.2 oz)   SpO2 97%   BMI 26.94 kg/m       Events: Pt reported feeling very depressed. Pt has plans but states \"it can't be done in the hospital.\" Will pass along to next nurse to reassess and for psych consult. Provider notified.  Plan of care: Continue with plan of care and update provider as needed.      "

## 2021-01-10 ENCOUNTER — PATIENT OUTREACH (OUTPATIENT)
Dept: CARE COORDINATION | Facility: CLINIC | Age: 56
End: 2021-01-10

## 2021-01-10 NOTE — PLAN OF CARE
Discharged to: Pt potentially eloped around 1500. Unsure where he went. Providers notified.   Transportation: Unsure  Time: 1500  Prescriptions:   Belongings: Pt took belongings that were in room.  PIV/Access: Still has PICC line in.   Paperwork:    During bedside report, writer noticed that all of patients belongings were gone. MD notified and aware.

## 2021-01-10 NOTE — PLAN OF CARE
"BP (!) 138/103 (BP Location: Right arm)   Pulse 91   Temp 97.9  F (36.6  C) (Oral)   Resp 18   Ht 1.676 m (5' 6\")   Wt 75.7 kg (166 lb 14.2 oz)   SpO2 97%   BMI 26.94 kg/m      Care from: 5111-7177      VS & Pain: VSS ex hypertensive, on room air, prn Dilaudid x2 and scheduled Tylenol were given for jaw pain    Neuro: A&O x4, per pt feeling depressed but no active SI- MD assessed and pt declined psychiatry consult    Respiratory: fine crackles in LLL, coarse lung sounds in R lobes     Cardiac: WDL    Peripheral neurovascular: baseline numbness and tingling in all extremities    GI/: adequate urine output and no BM this shift    Nutrition: good appetite and oral intake, denied nausea    Skin: bruised, L PICC, and medial and L mandible old drain sites- cleansed site with NS, REJI per MD, small serosanguinous drainage    Musculoskeletal: WDL    Lines: L PICC is in place    Activity: Up independently, ambulated in lewis x1    Events this shift: Call light within reach. Last seen pt at around 1500, noticed pt is off unit at around 1730, assumed that pt was ambulating around like yesterday, however, when writer was reading the provider (Aftab DSOUZA MD)'s note for poc updates at around 1830, writer saw that \"Patient likely to leave AMA over the next 24 hours, will touch base with ID about best oral regimen to order when patient decides to leave\" but writer was never notified by MD of any changes or plan of care all shift, that was when writer suspected pt left AMA due to no belongings are left in the room after double checking with Margoth LAZO at 1900 during bedside report. Writer, Margoth RN, and Najma charge RN notified ANS and security of the situation. Margoth LAZO will continue to follow up.    Plan: Awaiting for placement. Main team is consulting ID to transition to po abx per pt's request. Follow up on whether pt left AMA or not.  "

## 2021-01-11 NOTE — DISCHARGE SUMMARY
[facility]   Hospitalist Discharge Summary      Date of Admission:  12/30/2020  Date of Discharge:  1/9/2021  3:00 PM  Discharging Provider: Evelia Stevens MD  Discharge Team: Hospitalist Service, Kim 4    Discharge Diagnoses   Ludwigs Angina   Mandibular osteomyelitis   Malnutrition: non severe  COPD  Bipolar disorder  TBI  Polysubstance abuse  Anxiety      Follow-ups Needed After Discharge   Follow-up Appointments     Adult [facility] Follow-up and recommended labs and tests      Follow up with primary care provider, Physician No Ref-Primary, within 7   days for hospital follow- up.  The following labs/tests are recommended:   CMP, CBC    Follow up with infectious disease in 2 weeks     Appointments on [place] and/or [facility] (with [facility] or [facility]   provider or service). Call [phone] if you haven't heard regarding   these appointments within 7 days of discharge.         Follow Up ([facility])      Follow up in oral and maxillofacial surgery clinic next week (week of   1/4/2021), [facility] clinic will call to schedule. Location: [facility], 7th   floor. [address] [place] [address].         Follow Up and recommended labs and tests      Weekly CMP, CBC                Unresulted Labs Ordered in the Past 30 Days of this Admission     Date and Time Order Name Status Description    12/30/2020 2245 Fungus Culture, non-blood Preliminary     12/30/2020 2230 Fungus Culture, non-blood Preliminary       These results will be followed up by ID if follows up     Discharge Disposition   Discharged to home AMA  Condition at discharge: AMA    Hospital Course       Porfirio Clarke is a 55 year old male with a PMHx of COPD, TBI, bipolar disorder, polysubstance abuse, and homelessness who was admitted on 12/30/2020 as an emergent transfer from [facility] for progressively worsening left-sided facial swelling and pain with CT neck findings concerning  for Manny's angina and mandibular osteomyelitis, s/p abscess drainage, teeth extraction, mandibular biopsy by OMFS on 12/30.         Manny Angina   Mandibular osteomyelitis  Presented to [facility] ED on 12/30 for progressively worsening left sided facial swelling and pain with associated fevers, dysphagia, odynophagia. CT neck indicated multifocal periapical abscesses and findings consistent with Manny's angina. Emergently transferred to [facility] for operative repair with incision and drainage of bilateral submandibular, sublingual and submental abscesses by OMFS. Airway evaluated by ENT, so signs of airway compromise. Taken to OR on 12/30, successful drainage of multiple abscesses, teeth extraction, mandibular biopsy. Gram stain with GPCs. Mandibular bone culture with light growth Strep Anginosis. Submandibular abscess cx moderate growth Strep Intermedius. Tissue cultures with yeast and actinomyces. Plan was to optimally treat with IV unasyn through 1/30 and PO fluconazole. After IV and follow up with ID, plan to transition to prolonged course of PO Augmentin. Pt noting wanting to leave and not sit in hospital for full duration. Discussed plan of transition to PO at time that he decided to leave AMA. Unfortunately he did not notify provider and eloped without having PICC pulled and without getting prescriptions for PO antimicrobials. Attempted to call pt, phone noting the number not tking calls. If he represents, will need PICC pulled unless agreeable to IV antimicrobials.     Malnutrition:    Level of malnutrition: Non-Severe  Based on: mild (or greater) subcutaneous fat loss, mild (or greater) muscle loss, mild (or greater) fluid retention     Chronic Conditions PTA:   COPD: albuterol inhaler PTN  Bipolar disorder: PTA wellbutrin  Polysubstance abuse  TBI    Consultations This Hospital Stay   PHARMACY TO DOSE VANCO  PHARMACY TO DOSE VANCO  MEDICATION HISTORY IP PHARMACY CONSULT  INFECTIOUS DISEASE GENERAL ADULT  IP CONSULT  VASCULAR ACCESS CARE ADULT IP CONSULT  VASCULAR ACCESS CARE ADULT IP CONSULT  VASCULAR ACCESS CARE ADULT IP CONSULT  VASCULAR ACCESS CARE ADULT IP CONSULT  VASCULAR ACCESS CARE ADULT IP CONSULT  VASCULAR ACCESS FOR PICC PLACEMENT ADULT IP CONSULT  CARE MANAGEMENT / SOCIAL WORK IP CONSULT    Code Status   Prior    Time Spent on this Encounter   I, Evelia Stevens MD, discharged this patient today but I did not personally see the patient today and will not be billing for the patient's discharge. AMA       Evelia Stevens MD  [facility] [address] [address]  Phone: [phone]  ______________________________________________________________________    Physical Exam   Vital Signs:                   Weight: 166 lbs 14.21 oz  No exam at time of AMA       Primary Care Physician   Physician No Ref-Primary    Discharge Orders      Comprehensive metabolic panel    Weekly: 1/13, 1/20, 1/27, 2/3     CBC with platelets    Last Lab Result: Hemoglobin (g/dL)       Date                     Value                 01/05/2021               11.3 (L)         ----------     Infectious Disease Referral      Follow Up ([facility])    Follow up in oral and maxillofacial surgery clinic next week (week of 1/4/2021), [facility] will call [facility] schedule[facility]. [address] [place] [address].     Reason for your hospital stay    You were admitted for osteomyelitis of the jaw and treated with antimicrobials.     Adult P/[facility] Follow-up and recommended labs and tests    Follow up with primary care provider, Physician No Ref-Primary, within 7 days for hospital follow- up.  The following labs/tests are recommended: CMP, CBC    Follow up with infectious disease in 2 weeks     Appointments on [place] and/or [facility] (with [facility] or [facility] provider or service). Call [phone] if you haven't heard regarding these appointments within 7 days of discharge.      Follow Up and recommended labs and tests    Weekly CMP, CBC     Activity    Your activity upon discharge: activity as tolerated     Discharge Instructions    Continue to take fluconazole through 1/30    Take Augmentin BID until follow up with infectious disease to determine full course. With not completing IV medications, there is a larger chance your infection is not optimally treated and may come back. If you have worsening pain, swelling or other changes to the mouth, or fevers/chills, nausea/vomiting, lightheadedness or feel overall unwell, please return to the clinic/hospital for further testing and likely need for IV antibiotics.   You should follow up with a primary Dr within 1 week. You should go to the lab weekly to have your blood levels checked and follow up with infectious disease within 2 weeks     Diet    Follow this diet upon discharge: Orders Placed This Encounter      Room Service      Snacks/Supplements Adult: Boost Shake; With Meals      Mechanical/Dental Soft Diet       Significant Results and Procedures   Most Recent 3 CBC's:  Recent Labs   Lab Test 01/05/21  0435 01/02/21  0639 01/01/21  0438   WBC 8.3 8.2 11.3*   HGB 11.3* 10.8* 10.3*   MCV 84 84 85   * 512* 482*     Most Recent 3 BMP's:  Recent Labs   Lab Test 01/05/21  0435 01/04/21  0547 01/03/21  0610    140 140   POTASSIUM 3.4 3.4 3.6   CHLORIDE 109 109 109   CO2 24 20 21   BUN 13 11 14   CR 1.06 1.17 1.18   ANIONGAP 7 10 10   SILVERIO 9.1 9.1 8.8   * 100* 118*     Most Recent 2 LFT's:  Recent Labs   Lab Test 01/06/21  1001   AST 20   ALT 30   ALKPHOS 95   BILITOTAL 0.2     Most Recent INR's and Anticoagulation Dosing History:  Anticoagulation Dose History     Recent Dosing and Labs Latest Ref Rng & Units 12/31/2020    INR 0.86 - 1.14 1.30(H)        Most Recent ESR & CRP:  Recent Labs   Lab Test 01/05/21 0435   SED 72*   CRP 20.0*     Most Recent Anemia Panel:  Recent Labs   Lab Test 01/05/21 0435   WBC 8.3   HGB  "11.3*   HCT 35.1*   MCV 84   *       Discharge Medications   Discharge Medication List as of 1/9/2021 11:05 PM      CONTINUE these medications which have NOT CHANGED    Details   albuterol (PROAIR HFA/PROVENTIL HFA/VENTOLIN HFA) 108 (90 Base) MCG/ACT inhaler Inhale 2 puffs into the lungs every 4 hours as needed for shortness of breath / dyspnea or wheezing, HistoricalPharmacy may dispense brand covered by insurance (Proair, or proventil or ventolin or generic albuterol inhaler)      buPROPion (WELLBUTRIN XL) 150 MG 24 hr tablet Take 450 mg by mouth every morning, Historical      Lurasidone HCl (LATUDA PO) Take by mouth daily Unknown dose, patient given 1 month of samples as never able to get insurance to pay for it, Historical      naproxen (NAPROSYN) 500 MG tablet Take 500 mg by mouth 2 times daily (with meals), Historical           Allergies   Allergies   Allergen Reactions     Ceftriaxone Anaphylaxis     Per Care Everywhere, patient has tolerated penicillins previously.     Codeine Nausea and Vomiting and Other (See Comments)     Nauseous and itchiness (says \"it still works); PN Reaction Type: Allergy; PN Noted: 20060912  Nauseous and itchiness (says \"it still works); PN Reaction Type: Allergy; PN Noted: 20060912       Cephalexin Other (See Comments) and Rash     Skin slothing/peeling per pt. Has tolerated penicillins.  Skin slothing/peeling per pt. Has tolerated penicillins.       "

## 2021-01-12 NOTE — PROGRESS NOTES
Attempted to contact the patient x3 for post-hospital call without answer. Will close encounter at this time.    Joana Martines CMA, [facility]  Post Hospital Discharge Team

## 2021-01-13 ENCOUNTER — NURSE TRIAGE (OUTPATIENT)
Dept: NURSING | Facility: CLINIC | Age: 56
End: 2021-01-13

## 2021-01-13 NOTE — TELEPHONE ENCOUNTER
Was on IV vancomyacin and 2 other ones  Was there for 2 weeks  Crystal's angina  With subsequent surgery    Had to leave due to outside crisis and then when he tried to come back, they would not let him.     Throat is starting to close back up   Looks like he has severe cellulitis back in both feet as well      Left hospital 2-3 days ago    Advised returning to ED for evaluation and treatment.   Patient is agreeable.       Reason for Disposition    Patient sounds very sick or weak to the triager    Additional Information    Negative: Severe difficulty breathing (e.g., struggling for each breath, speaks in single words)    Negative: Sounds like a life-threatening emergency to the triager    Negative: [1] Recent hospitalization for pneumonia AND [2] taking an antibiotic    Negative: [1] Animal bite infection AND [2] taking an antibiotic    Negative: [1] Cellulitis AND [2] taking an antibiotic    Negative: [1] Ear  infection (Otitis Media) AND [2] taking an antibiotic    Negative: [1] Ear  infection (Swimmer's Ear) AND [2] taking an antibiotic    Negative: [1] Sinus infection AND [2] taking an antibiotic    Negative: [1] Strep throat AND [2] taking an antibiotic    Negative: [1] Urinary tract  infection (e.g., cystitis, pyelonephritis, urethritis, epididymitis) AND [2] male AND [3] taking an antibiotic    Negative: [1] Urinary tract  infection (e.g., cystitis, pyelonephritis, urethritis) AND [2] female AND [3] taking an antibiotic    Negative: [1] Wound infection AND [2] taking an antibiotic    Protocols used: INFECTION ON ANTIBIOTIC FOLLOW-UP CALL-A-    Mami Shaffer RN on 1/13/2021 at 3:37 AM

## 2021-01-20 ENCOUNTER — VIRTUAL VISIT (OUTPATIENT)
Dept: INFECTIOUS DISEASES | Facility: CLINIC | Age: 56
End: 2021-01-20
Attending: STUDENT IN AN ORGANIZED HEALTH CARE EDUCATION/TRAINING PROGRAM
Payer: MEDICARE

## 2021-01-20 DIAGNOSIS — K12.2 ORAL ABSCESS: ICD-10-CM

## 2021-01-20 DIAGNOSIS — K12.2 LUDWIG'S ANGINA: Primary | ICD-10-CM

## 2021-01-20 PROCEDURE — 99207 PR NO CHARGE LOS: CPT | Performed by: STUDENT IN AN ORGANIZED HEALTH CARE EDUCATION/TRAINING PROGRAM

## 2021-01-20 NOTE — PROGRESS NOTES
Patient did not arrive for virtual visit as scheduled. IV antibiotic therapy scheduled to end today and needs to be transitioned to oral regimen (Augmentin 875mg PO BID) and to continue fluconazole he is already taking. We will continue to attempt to reach out to patient and emergency contact to arrange appropriate care as able. [facility] (noted cephalosporin allergy report, has tolerated Unasyn well and other penicillins per records).    Gaston Chatterjee MD  Division of Infectious Diseases and International Medicine  P: [phone]

## 2021-01-20 NOTE — LETTER
1/20/2021       RE: Porfirio Clarke  [address] [place] [address]     Dear Colleague,    Thank you for referring your patient, Porfirio Clrake, to the [facility] [place] at [facility]. Please see a copy of my visit note below.    Patient did not arrive for virtual visit as scheduled. IV antibiotic therapy scheduled to end today and needs to be transitioned to oral regimen (Augmentin 875mg PO BID) and to continue fluconazole he is already taking. We will continue to attempt to reach out to patient and emergency contact to arrange appropriate care as able. Augmentin prescription has been sent to the [place] pharmacy (noted cephalosporin allergy report, has tolerated Unasyn well and other penicillins per records).    Gaston Chatterjee MD  Division of Infectious Diseases and International Medicine  P: [phone]

## 2021-01-20 NOTE — PATIENT INSTRUCTIONS
Please fill Augmentin prescription (sent to [facility]) and begin taking this medication twice daily as soon as possible once you receive the last dose of your IV antibiotics.

## 2021-01-28 LAB
FUNGUS SPEC CULT: ABNORMAL
FUNGUS SPEC CULT: ABNORMAL
FUNGUS SPEC CULT: NORMAL
SPECIMEN SOURCE: ABNORMAL
SPECIMEN SOURCE: NORMAL

## 2021-05-26 ENCOUNTER — RECORDS - HEALTHEAST (OUTPATIENT)
Dept: ADMINISTRATIVE | Facility: CLINIC | Age: 56
End: 2021-05-26

## 2021-05-28 ENCOUNTER — RECORDS - HEALTHEAST (OUTPATIENT)
Dept: ADMINISTRATIVE | Facility: CLINIC | Age: 56
End: 2021-05-28

## 2021-05-30 VITALS — BODY MASS INDEX: 25.82 KG/M2 | HEIGHT: 66 IN

## 2021-05-31 VITALS — WEIGHT: 158 LBS | HEIGHT: 66 IN | BODY MASS INDEX: 25.39 KG/M2

## 2021-06-08 NOTE — PROGRESS NOTES
[facility]  Mental Health and Addiction Care  [facility], [facility], and [facility]   [phone] or [phone]    MASTER PLAN    Patient Name: Porfirio Clarke   MRN:[id number]  Counselor: Heike Nagel MA, [facility]    Title: Dimension 1-Withdrawal Potential, Risk Level 0  Diagnosis:   Amphetamine Use Disorder-Severe (F15.20) (304.40)  Cannabis Use Disorder-Moderate (F12.20) (304.30)    Problem: Patient reports last use of meth on 1/10/2017 and couple puffs of marijuana on 2/11/2017.   Patient denied of any withdrawals at intake.  No withdrawal signs or symptoms observed.  Treatment plan for this dimension is not needed at this time.        Title: Dimension -Biomedical Condition, Risk Level 2   Nicotine User: no, Amount per day: N/A   Problem:  Medical history- Chronic pain syndrome, Osteomyelitis of spine, TBI (traumatic brain injury)  Pt states he has not been taking any medications since he got out of  inpatient treatment on 1/31/2017.  Pt states his senior living/disability payment is $1100 but said he could not afford the medication co-pay because there was a stop on his account due to fraudulent use.  Pt states he does not have any regular doctor that he visits because has never seen one particular PCP and cares were always scattered.  Goal: to learn to attend to physical/medical needs while gaining new recovery skills  Begin Date: 2/15/2017  Target Date: 6/15/2017  Date Completed:    /   /     Method 1: I will set up a primary care and meet with a provider   Begin Date: 2/15/2017   Target Date: 6/15/2017   Date Completed:    /   /     Method 2: Once I receive SSI payment and able to afford medications, I will follow the medication order and not alter how I take them.     Begin Date: 2/15/2017  Target Date: 6/15/2017  Date Completed:    /   /       Title: Dimension 3-Emotional/Behavioral/Cognitive Conditions and Complications, Risk Level  3  Problem: Pt reports past mental health diagnosis of  PTSD, Bipolar Type 1, Anxiety, and Depression.  Patient is not working with any therapist but he is scheduled to meet with a psychiatrist Dr. Vega on 3/17 at [facility].  Pt reports he thinks about dying all the time but does not report any plan.    Goal: To learn to cope with all external/internal stress, gain coping skills for mental health symptoms and stay sober.    Begin Date: 2/15/2017  Target Date: 6/15/2017  Date Completed:    /   /     Method 1: I will schedule Diagnostic Assessment with a therapist at [facility].  I will follow the therapist recommendation on how often I will need to meet him/her.    Begin Date: 2/15/2017  Target Date: 6/15/2017  Date Completed:    /   /     Method 2: I will attend chemical dependency outpatient groups on Tuesdays and Thursdays 12:30-3:30pm at [facility].    Begin Date: 2/15/2017  Target Date: 6/15/2017  Date Completed:    /   /     Method 3: I will attend abstinence-based community support groups.    Begin Date: 2/15/2017  Target Date: 6/15/2017  Date Completed:    /   /    Title: Dimension 4-Treatment Acceptance/Resistance, Risk Level 3  Probation involvement: yes   Problem: Patient is on [facility] probation for felony drug possession from December 2015; possession of Shoplifting Gear from September 2015.     Patient has three felonies convictions since 2011.  Pt is mandated by probation to complete treatment.      Goal: to become more accountable for my own life and to take initiative on my sown recovery from chemical use  Begin Date: 2/15/2017  Target Date: 6/15/2017  Date Completed:    /   /    Method 1: I will comply with probation order.    Begin Date: 2/15/2017  Target Date: 6/15/2017  Date Completed:    /   /     Method 2: I will submit random UA and breathalyzer test any time [facility] and/or probation ask me to do so.  I understand refusing to cooperate may result in  automatic positive result.    Begin Date: 2/15/2017  Target Date: 6/15/2017  Date Completed:    /   /     Title: Dimension 5-Relapse/Continued Use/Continued Problem Potential, Risk Level 3   Problem: Pt used marijuana after he got out of inpatient treatment.  Pt states he is aware of probation and UA responsibility to stay clean but justified his use as chronic pain management.    Goal: Gain more knowledge and build skills for relapse prevention.    Begin Date: 2/15/2017  Target Date: 6/15/2017  Date Completed:    /   /     Method 1: I will not use marijuana as a way to self-medicate my pain.   I will first set up a primary care doctor and consult with him/her abut if seeing a pain specialist and getting the pain management care is necessary.    Begin Date: 2/15/2017  Target Date: 6/15/2017  Date Completed:    /   /     Method 2: I will identify 5 reasons why I use meth and marijuana.  I will develop a relapse prevention by attending Tuesday and Thursday CD outspent groups.   Begin Date: 2/15/2017  Target Date: 6/15/2017  Date Completed:    /   /     Method 3: I will gain deep breathing and meditation skills to cope and distract myself from urges/cravings. I will develop the skills by attending service coordination groups and meeting with my .    Begin Date: 2/15/2017  Target Date: 6/15/2017  Date Completed:    /   /    Title: Dimension 6-Recovery Environment, Risk Level 2   Problem: Pt states he lives at his father's for now but looking for his own apartment.  Pt sates he does not go to AA and just hang out with sober people.  Pt states he used to work and retired/disabled at this time with monthly income about $1100/month.  Goal: Integrate sober life knowledge into my day-to-day life and engaged in healthier structured life style that supports recovery from chemical use.    Begin Date: 2/15/2017  Target Date: 6/15/2017  Date Completed:    /   /     Method 1: Since I do not agree with AA philosophy, I  will attend other abstinence-based community support groups.    Begin Date: 2/15/2017  Target Date: 6/15/2017  Date Completed:    /   /     Method 2: I will work with a telephone  who will call me weekly to check on my sobriety.    Begin Date: 2/15/2017  Target Date: 6/15/2017  Date Completed:    /   /     Method 3: I will attend Recreational Recovery Group on Wednesdays at Service Coordination when the time/event is organized.    Begin Date: 2/15/2017  Target Date: 6/15/2017  Date Completed:    /   /    Method 4: I will look into felony-friendly apartment.  I understand that my recent felonies may cause a difficulty for this process and take longer.    Begin Date: 2/15/2017  Target Date: 6/15/2017  Date Completed:    /   /      By signing this document, I am acknowledging that I was actively and directly involved in the development of my treatment plan.    Client Signature:____________________________________________ Date:  2/15/2017    Staff Signature: ____________________________________________ Date: 2/15/2017  Counselors:  CYNTHIA Evans MA

## 2021-06-08 NOTE — PROGRESS NOTES
Addiction Services - Initial Services Plan      Name:  Profirio Clarke       :  1965        MRN:  [id number]    Goal Methods   1.  Acceptance of chemical dependency and mental illness as a disease. A.  Comply with all med/psych recommendations  B.  Complete preliminary interviews  C.  Attend all program functions  D.  Attend all individual counseling sessions  E.  Read all assigned literature  F.  Complete psychological testing as assigned  G.  Particpate in any necessary consultations     2.  Acceptance of my need and ability to change A.  Complete written workbook assignments on MICD  B.  Participate in conferences (P.O., , family)  C.  Participate in 12 Step/support groups  D.  Actively participate in treatment planning and reviews  E.  Complete all assignments given or recommended on Treatment Plan  F.  Present Drug History/Peer Assessment with peer group  G.  Complete 10th Step Inventory daily   3.  Acceptance of staff recommendations as a means to my recovery A.  Participate in all interviews for Continuum of Care Plans  B.  Familiarize self with 12 Step Recovery Program and how this applies to your day-to-day behavior  C.  Demonstrate a working knowledge of AA steps of recovery  D.  Obtain a sponsor     Patient describes their immediate need:  Medications   Are there any immediate Safety Needs such as (physical, stability, mobility):  None     Immediate Health Needs and Plan:  None, but patient has not been taking medications since inpatient discharge.      Vulnerable Adult:  No     [] Continue Current Medications for:   [] Request Consult for:  [] Notify Attending Physician about:  [x] Other:  Patent needs to set up primary care.     Issues to be addressed i the first sessions:    Patient states he needs to pay $40 co-pay and  his medications at [employer].      Patient strengths and needs:  Pt states he used to have a job and house.  Pt states he was an  for 26 years.       Plan for patient for time between intake and completion of the treatment plan:  Set up primary care     Staff Members' Titles authorized to Initiate Services are:    Director of Behavioral Service    Clinical Director of Chemical Dependency    Primary Counselor    MI/KRISTAL Angulo. Counselor        Nursing Staff    Vulnerable Adult Review  [x] Review of the facility Abuse Prevention plan was reviewed with the patient  [x] No individual abuse plan is necessary  [] In addition to the facility Abuse Prevention plan, an Individual Abuse Plan will be put in place    I understand these goals to be the Treatment Goals of the Program, and I agree to the stated Methods in attempting to accomplish these goals.    Patient Signature:  _________________________Date:  2/13/2017      Staff Name/Title:  Heike Nagel MA [facility]     Date:  2/13/2017  Time: 12:10 PM

## 2021-06-08 NOTE — PROGRESS NOTES
D) Linda Patino from [facility] called this writer about patient   A)Linda said patient had a horrible cold and missed the intake last week.  Linda asked to either call her or patient to reschedule intake.    T)haroon writer spoke with patient.  Pt said he was sick.  We decided to schedule intake for next Monday 2/13 at 11 am.  Pt will call his probation to report that he has scheduled intake.    Linda at [phone].  Patient at [phone].

## 2021-06-08 NOTE — PROGRESS NOTES
D)met with patient at 2700 and scheduled intake for outpatient CD.    T)intake scheduled for Friday 2/3 at 10 am.

## 2021-06-09 NOTE — PROGRESS NOTES
Weekly Progress Note  D: Patient attended 2/2 group this week  A: Staff facilitated groups and reviewed treatment progress. Assessed for [facility].   R: No VAP needed at this time. Patient working on the following dimensions:  Dimension #1 - Withdrawal Potential - Risk 0. On the intake date of 2/13/2017, patient reported his last use of meth as 1/10/2017 and marijuana as on-going.    Pt denied of any withdrawals concern.    Dimension #2 - Biomedical - Risk 1. Chronic pain, TIB. Patient was prescribed some medications at the time of inpatient CD treatment discharge but has not picked up meds.  Patient states he does not have money for meds, but he frequently reports using his money for something else.    Dimension #3 Emotional/Behavioral/Cognitive - Risk 3.  MH Histofy of Bipolar, depression, anxiety and PTSD reported. Patient is scheduled for DA with Yobany Flores on 3/28.  Patient admitted to using meth by mistake.  Pt explains that he was offered E-cigarette but someone put meth in it.  Pt met with psychiatrist this week and was recommended for [facility].  Pt said he went there before and agreed to go there.    Dimension #4 - Treatment Acceptance/Resistance - Risk 3.  Three felonies since 2011 and on probation at [facility].  Treatment is probation order.   seems to be very understanding of patient's TBI and tendency to forget.  PO recommended treatment to be flexible for him.  PO is ware of patient's current marijuana use but considers meth sobriety as success.  PO recommends more services tailored to his TBI.    Dimension #5 - Relapse Potential - Risk 3. Patient reports the longest sobriety is 1 year but that was when he was locked up.  Patient is not taking any psych medications at this time.  Patient has diagnostic assessment on 3/28. Patient met with psychiatrist this week and was [facility].    Dimension #6 - Recovery Environment - Risk 3.  Pt lives with his father but  the relationship is unhealthy according to PO.  Pt moved out of his father's temporarily becasue there was too much tension. Pt is couch hopping at this time.   Pt states he does not like attending AA meeting.  Patient is retired/disabled and receives $1100/Month.  Patient refuses the idea of moving into group residential housing because he does not want to lose his SSI, but he wants to find a private apartment.  Pt admits she has been couch hoping his friends but options is getting smaller and people are using.      T: Education provided on Anger.  Discharge date is preliminary 6 months from the intake depending on patient's progress.  Intake was on 2/13/2017.  Patient completed 2 hours of CD/Outpaietn so far.      Psycho-Educational Curriculum  Date Attended  Psycho-Educational Curriculum  Date Attended    Acceptance   Shame/Guilt  3/2    1st Step   Anger/Rage  3/16, 3/21, 3/23    Affirmations   Mental Health     Automatic Negative Thoughts   Anxiety     Cross Addiction   Co-Occurring Disorders     Stages of Change   Inge/Bipolar     Relapse   Trauma  3/14, 3/16    Addictive Thoughts   Victim Identity     Coping Skills   Sober Structure     Relapse Prevention   Continuum of Care     Medical Aspects   Non-12 Step Support     Brain/Neurotransmitters   Priorities     Medication Compliance   Spirituality     VANNA Alcohol/Drug Research   Weekend Planner     Physical Health   Educational Videos     Post Acute Withdrawal   1st Step     Pregnancy and Drug Use   2nd Step     Sexual Health   Assertive Communication     Short-Term/Long-Term Effects   My name is Broderick CANO    Relationships   Cross Addiction     Assertive Communication   God As We Understood Him     Boundaries   HBO Relapse     Codependence   HBO What Is Addiction     Defense Mechanisms   Medical Aspects 1     Family Roles   Medical Aspects 2     Goodbye Letter   National Geographic: Stress     Intimacy   PBS Depression Out of the Shadows     Needs/Dealbreakers  "in Relationships   The Anonymous People    Socialization Skills   [place]     Feelings   John Macedo \"Highjacked Brain\"    ABC Model of Emotion   Srinath Conti Humor in Tx    Grief and Loss   The Mindfulness Movie    Healthy vs. Unhealthy Feelings   Broderick CANO documentary     Meditation/Mindfulness   Health Goals    Overconfidence/Complacency   Budget  2/21   Resentment       Stress         "

## 2021-06-09 NOTE — PROGRESS NOTES
"D)Patient called and apologized for missing yesterday's group.  A)Pt said he received a letter from [facility] stating he has to present a proof of insurance by yesterday.  Pt said he went to [facility] but ended up losing his 's license.    R)Pt said his friend bought a van but did not want to be the irene dietz and asked patient to be the owner.     Pt said he wanted to help out his friend so decided he would be the official owner while his friend actually drives the van.  Pt said his friend was supposed to keep up with auto insurance.  Pt said he recived a letter from [facility] stating patient has to submit a proof of insurance.  Pt said he asked his friend to bring that to [facility] but the friend never showed up and patient lost 's license.  Patient was upset about this and said \"it always end up like this. I try to help people and they take advantage of me.\" This writer asked if his friend could be using drugs.  Patient said his friend always used drugs on and off.  Pt was worried that if his friend does any criminal activities with this van, he would be responsible because he is the owner.  Pt said he is going to get the van back and ask the friend to pay for $480 for not having proof of insurance.    "

## 2021-06-09 NOTE — PROGRESS NOTES
3/16/2017   D) CD Outpatient/Service Coordination Group.    A) Patient attended 3 hours of Service Coordination Group today.    We had therapy dogs for the first 20 minutes.    T) Education provided: Anger

## 2021-06-09 NOTE — PROGRESS NOTES
D)[facility] Probation contacted   A) Linda Ugalde said patient has felony charges in [place] and if he gets convicted, it will be probation violation in [place].  Pt is scheduled to go back to see a  in a month and probatio officer is encouraginging him to stay sober.  PO said patient came to meeting 2 days ago and agreed on finding a private sober house, not Groups Residential Housing where he has to give up his SSI.  PO also suggested patient to get  for his TBI.    T)This writer called [facility] and asked how to make referral to their service.

## 2021-06-09 NOTE — PROGRESS NOTES
D) CD Outpatient/Service Coordination Group  A) Patient attended 3 hours of Service Coordination Group today.     R) Patient identified today s mood as 5 (0- not good, 10-excellent) and sober from heroin since 2/10 and admitted he still smokes pot sometimes      Group Check in 1. How did you stay clean/sober past weekend? Pt said he kept busy with visiting her paralyzed friend at [facility].  Pt said he has not been at his fathers for couple days and not eating this whole time.  Pt said hewa svery hungry.  Pt was suggested to go to [employer] during the first break and get something to eat from the Food Truck.    Group Check in 2. What is the positive outcome from quitting?  Pt said all police knows him and frequently stops him for random drug search.  Pt said for the last 5 times they have not found any drug with him and he is very proud.    T) Education provided:  Anger

## 2021-06-09 NOTE — PROGRESS NOTES
D)[facility] Referral process  A)Part of referral paper includes PHYSICAL EXAM and MEDICAL HISTORY form to be signed by physician.  This has to get signed by MD patient has been working with, but he has not set doctor at this time.  Contacted Jc Baltazar  who offered some medical history but could not tell whether patient has been seeing doctor in recent time or not.  She did inform patient has early stage of dementia and 2 TBIs.  PO was surprised patient was open to the idea of going to [facility] and was happy about it.  There was a unexpected appointment opening at 1:30 with Dr. Vega today and patient was placed on that time slot. This writer called patient and informed.    T)This writer to meet with patient to fill the EXAM and MEDICAL form to the best of the ability without MD signature and explain the situation to [facility].

## 2021-06-09 NOTE — PROGRESS NOTES
D)[place] [facility] responded   A)they provide resources not case management.  Thre is a waiting list of 3-6 months.    T)Patient's information given to them and asked to place him on the waiting list.

## 2021-06-09 NOTE — PROGRESS NOTES
D)Patient called this writer. Pt apologized for not attending any group last week. Pt said he came after the group was over.  This writer reminded him of group time and hours.    A)Patient said hew was scheduled for hearing yesterday on President Day in [place], but he did not go to the court and now there is a warrant issued.  Pt said he cannot attend group because there is a warrant and he has to be careful.   T)This writer asked patient if there really was a hearing scheduled on the national holiday, President Day. Pt said there was.  This writer told patient that he has to take care of his warrant and also inform his [facility] probation as well.  Patient kept saying he could not attend group today because he has to be careful. This writer asked if he was afraid of taking the bus. He said he was afraid that he would get arrested at the hospital. This writer told patient that he has to take care of training himself in at some point but being in CD treatment should be a benefit for him.  Pt said he would attend group today.

## 2021-06-09 NOTE — PROGRESS NOTES
D)1:1  A) met with patient after his first group attendance.  We reviewed master treatment plan.  patient signed and received copy.    R)Pt shared more about living at his father's. Pt described that his father is mentally ill and very difficult to be with. Pt is looking for a regular apartment so he can be independent and welcome his girlfriend when she comes out of custodial.  We looked for felony-friendly apartment.  Pt seemed he liked \[facility]".  Pt said he arranged his pension to start in sometime in March.  Pt said once he receive the payment, he would like to contact the apartment.    T)Bus card given to patient.

## 2021-06-09 NOTE — PROGRESS NOTES
D) called  A)PO said she also got the same information from patient stating he worked things out with his father and that he is stable with his housing for now.  PO said patient told her that he wants to stay at his father's instead of going to [facility].  PO said she will be OK with that plan and staying in CD outpatient program.

## 2021-06-09 NOTE — PROGRESS NOTES
D)[facility] Linda called this writer [phone]  A)PO informed that patient needs a lot of help due to TBI. PO recommended not to be too strict with patient because he makes progress on his own term.  PO said it took her years to get him into inpatient treatment, and the fact that he completed the inpatient treatment was a huge deal.  PO confirmed that patient has active warrant in [place].  PO asked for letter stating when he finished Inpatient treatment and when he started outpatient treatment.    T)Letter faxed to PO

## 2021-06-09 NOTE — PROGRESS NOTES
D)[facility] contacted and asked about the referral process   A)After the patient's initial appointment with addiction psychiatrist Dr. Brunner from yesterday, [facility] was recommended as the potential alternative to address patient's TBI and addiction.    T)isaiah writer left a message to admission office at [facility] and asked about the referral process.  As of yeserday when this writer met with patient after the group and his psychiatric appointment, patient admitted to smoking meth by mistake.  Pt explained that someone offered an E-cigarette with meth inside.

## 2021-06-09 NOTE — PROGRESS NOTES
Weekly Progress Note  D: Patient attended 2/2 group this week  A: Staff facilitated groups and reviewed treatment progress. Assessed for [facility].   R: No VAP needed at this time. Patient working on the following dimensions:  Dimension #1 - Withdrawal Potential - Risk 0. On the intake date of 2/13/2017, patient reported his last use of meth as 1/10/2017 and marijuana as on-going.    Pt denied of any withdrawals concern.    Dimension #2 - Biomedical - Risk 1. Chronic pain, TIB. Patient was prescribed some medications at the time of inpatient CD treatment discharge but has not picked up meds.  Patient states he does not have money for meds, but he frequently reports using his money for something else.    Dimension #3 Emotional/Behavioral/Cognitive - Risk 3.  MH Histofy of Bipolar, depression, anxiety and PTSD reported. Patient is scheduled for DA with Yobany Flores on 3/28.  Pt reports chronic depression and anger toward his father.  Pt continues to go back and forth with living at his father's and staying at friend's.  Pt frequently talks about wanting to move into an apartment, but when this writer offers referral to group [facility] housing he declinies the offer stating he does not want to lose his SSI.    Dimension #4 - Treatment Acceptance/Resistance - Risk 3.  Three felonies since 2011 and on probation at [facility].  Treatment is probation order.   seems to be very understanding of patient's TBI and tendency to forget.  PO recommended treatment to be flexible for him.  PO is ware of patient's current marijuana use but considers meth sobriety as success.  PO recommends more services tailored to his TBI.    Dimension #5 - Relapse Potential - Risk 3. Patient reports the longest sobriety is 1 year but that was when he was locked up.  Patient is not taking any psych medications at this time.  Patient was scheduled for psychiatry on 3/13 and diagnostic assessment on 3/28.    Dimension #6 - Recovery  Environment - Risk 3.  Pt lives with his father but the relationship is unhealthy according to PO.  Pt moved out of his father's temporarily becasue there was too much tension. Pt is couch hopping at this time.   Pt states he does not like attending AA meeting.  Patient is retired/disabled and receives $1100/Month.  Patient refuses the idea of moving into group residential housing because he does not want to lose his SSI, but he wants to find a private apartment.      T: Education provided on relapse.  Discharge date is preliminary 6 months from the intake depending on patient's progress.  Intake was on 2/13/2017.  Patient completed 2 hours of CD/Outpaietn so far.      Psycho-Educational Curriculum  Date Attended  Psycho-Educational Curriculum  Date Attended    Acceptance   Shame/Guilt  3/2    1st Step   Anger/Rage  3/16   Affirmations   Mental Health     Automatic Negative Thoughts   Anxiety     Cross Addiction   Co-Occurring Disorders     Stages of Change   Inge/Bipolar     Relapse   Trauma  3/14, 3/16    Addictive Thoughts   Victim Identity     Coping Skills   Sober Structure     Relapse Prevention   Continuum of Care     Medical Aspects   Non-12 Step Support     Brain/Neurotransmitters   Priorities     Medication Compliance   Spirituality     VANNA Alcohol/Drug Research   Weekend Planner     Physical Health   Educational Videos     Post Acute Withdrawal   1st Step     Pregnancy and Drug Use   2nd Step     Sexual Health   Assertive Communication     Short-Term/Long-Term Effects   My name is Broderick CANO    Relationships   Cross Addiction     Assertive Communication   God As We Understood Him     Boundaries   HBO Relapse     Codependence   HBO What Is Addiction     Defense Mechanisms   Medical Aspects 1     Family Roles   Medical Aspects 2     Goodbye Letter   National Geographic: Stress     Intimacy   PBS Depression Out of the Shadows     Needs/Dealbreakers in Relationships   The Anonymous People    Socialization  "Skills   [place]     Feelings   John Macedo \"Highjacked Brain\"    ABC Model of Emotion   Srinath Conti Humor in [place]    Grief and Loss   The Mindfulness Movie    Healthy vs. Unhealthy Feelings   Broderick CANO documentary     Meditation/Mindfulness   Health Goals    Overconfidence/Complacency   Budget  2/21   Resentment       Stress         "
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
## 2021-06-09 NOTE — PROGRESS NOTES
D) CD Outpatient/Service Coordination Group.  This was patient's first group attendance.  Pt was not shy and participated very well.  Pt was introduced to group structure, rules and members.    A) Patient attended 3 hours of Service Coordination Group today.    R) Patient identified today s mood as 4 (0- not good, 10-excellent).  Patient said he already figured out his pension payment and it should start in March.    T) Education provided: monthly budget,  wants and needs.    16-Apr-2019
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
## 2021-06-09 NOTE — PROGRESS NOTES
"D) CD Outpatient/Service Coordination Group  A) Patient attended 3 hours of Service Coordination Group today.     R) Patient identified today s mood as 5 (0- not good, 10-excellent) and sober from meth since 2/10/2017 and admitted he is still using marijuana.     Group Check in 1. What one positive thing happened last weekend? PT said he did nothing.    Group Check in 2. Define   Sobriety  in your own words. \"No court cases\"  Patient asked group members if they have any housing resources.  One group member gave him a potential opening and phone number to the .    T) Education provided: Grief and its process   "

## 2021-06-09 NOTE — PROGRESS NOTES
Weekly Progress Note  D: Patient attended 1/1 group this week. Patient was unable to attend the Tuesday group due to conflicting psychotherapy.    A: Staff facilitated groups and reviewed treatment progress. Assessed for [facility].   R: No VAP needed at this time. Patient working on the following dimensions:  Dimension #1 - Withdrawal Potential - Risk 0. On the intake date of 2/13/2017, patient reported he smoked meth by accident recently.    Dimension #2 - Biomedical - Risk 1. Chronic pain, TIB. Patient was prescribed some medications at the time of inpatient CD treatment discharge but has not picked up meds.  On 3/23 when he showed up to group, patient looked he lost weight . Pt admitted he has not been eating for several days because he was staying at friend's house.  Pt said he was getting tired of being homeless and admitted to using meth by mistake one time when someone offered E-cigarette.  This week, patient looked he gained significant amount of weight.  Pt said he reconciled with his father and eating a lot at his father's house.    Dimension #3 Emotional/Behavioral/Cognitive - Risk 2.  MH Histofy of Bipolar, depression, anxiety and PTSD reported. Patient completed DA with psychotherapist Yobany Flores.  Patient admitted using meth by mistake, just one time. Pt said he got tired of being homeless and subjecting himself to dangers and use.  Pt met with his father and asked to let him stay at his house.  Pt said his father changed his attitude and has been helping patient transport him to places and offering foods.  Dimension #4 - Treatment Acceptance/Resistance - Risk 3.  Three felonies since 2011 and on probation at [facility].  Treatment is probation order.   seems to be very understanding of patient's TBI and tendency to forget.  PO recommended treatment to be flexible for him.  PO is ware of patient's current marijuana use but considers meth sobriety as success.  PO recommends more services  tailored to his TBI.    Dimension #5 - Relapse Potential - Risk 3. Patient reports the longest sobriety is 1 year but that was when he was locked up.  Patient is not taking any psych medications at this time.  Patient completed diagnostic assessment and psychotherapy scheduled for 4/12.    Dimension #6 - Recovery Environment - Risk 2. Patient reports he reconciled with his father. Patient's father is allowing him housing, food, and transportation.    T: Education Addiction Impact on health, Urges and Meditation.  Discharge date is preliminary 6 months from the intake depending on patient's progress.  Intake was on 2/13/2017.  Patient completed 2 hours of CD/Outpaietn so far.      Psycho-Educational Curriculum  Date Attended  Psycho-Educational Curriculum  Date Attended    Acceptance   Shame/Guilt  3/2    1st Step   Anger/Rage  3/16, 3/21, 3/23    Affirmations   Mental Health     Automatic Negative Thoughts   Anxiety     Cross Addiction   Co-Occurring Disorders     Stages of Change   Inge/Bipolar     Relapse   Trauma  3/14, 3/16    Addictive Thoughts   Victim Identity     Coping Skills   Sober Structure     Relapse Prevention   Continuum of Care     Medical Aspects   Non-12 Step Support     Brain/Neurotransmitters  3/30  Priorities     Medication Compliance   Spirituality     VANNA Alcohol/Drug Research   Weekend Planner     Physical Health   Educational Videos     Post Acute Withdrawal   1st Step     Pregnancy and Drug Use   2nd Step     Sexual Health   Assertive Communication     Short-Term/Long-Term Effects  3/30  My name is Broderick AYALASal    Relationships   Cross Addiction     Assertive Communication   God As We Understood Him     Boundaries   HBO Relapse     Codependence   HBO What Is Addiction     Defense Mechanisms   Medical Aspects 1     Family Roles   Medical Aspects 2     Goodbye Letter   National Geographic: Stress     Intimacy   PBS Depression Out of the Shadows     Needs/Dealbreakers in Relationships   The  "Anonymous People    Socialization Skills   [place]     Feelings   John Macedo \"Highjacked Brain\"    ABC Model of Emotion   Srinath Conti Humor in Tx    Grief and Loss   The Mindfulness Movie    Healthy vs. Unhealthy Feelings   Broderick CANO documentary     Meditation/Mindfulness   Health Goals    Overconfidence/Complacency   Budget  2/21   Resentment       Stress         "

## 2021-06-09 NOTE — PROGRESS NOTES
D) CD Outpatient/Service Coordination Group  A) Patient attended 3 hours of Service Coordination Group today.   We had therapy dogs for the first 20 minutes.    R) Patient identified today s mood as 3 (0- not good, 10-excellent) and sober for from meth since 2/10/2017. Pt admitted he is using marijuana here and there.  Pt shared he is chronically depressed.  Pt shared about his permanent neck injection and his most recent ODs all related to drug use.  Patient was asked to scheduled Diagnostic Assessment and start psychotherapy.    T) Each group members was asked to sharing their life stories before getting into treatment.  Patient took time and explained how addiction impacted his life and how he got into treatment.

## 2021-06-09 NOTE — PROGRESS NOTES
D) CD Outpatient/Service Coordination Group  A) Patient called in before the group and said he might be late due to the bus.  Pt came to group later and shared that he found out that he had 5 felony warrants.  Pt said he turned himself in and schedule regina.  Pt said some of the charges already got dropped to lesser degree but he still has to report this to his current .  Pt was encouraged to keep doing the right thing.     T) Education provided: Price of Unforgiveness -being hooked

## 2021-06-10 NOTE — PROGRESS NOTES
"Weekly Progress Note  D: Patient attended 1/2 group this week. Patient was unable to attend the Thursday group due to going to [facility]   A: Staff facilitated groups and reviewed treatment progress. Assessed for [facility].   R: No VAP needed at this time. Patient working on the following dimensions:  Dimension #1 - Withdrawal Potential - Risk 0. Patient seems he is not counting the \"accidental meth use\" from several weeks ago and states he is sober sicnce he got into inpatient treatment, 2/10/2017.    Dimension #2 - Biomedical - Risk 1. Chronic pain, TIB. Patient was losing weight when he left his father's and stayed at friends. Pt reconciled with his father and moved back with him.   Pt gained healthy weight.  Pt stets \"My dad is mean kath but he feeds me really good.\"  Dimension #3 Emotional/Behavioral/Cognitive - Risk 2.  MH Histofy of Bipolar, depression, anxiety and PTSD reported. Patient completed DA with psychotherapist Yobany Flores on 3/28 and scheduled for weekly therapy session.   Pt reported he had to go to [facility] on Thursday and starting his shelter time at [facility] from Friday 5/5 to 5/25.  Probation was contacted and was confirmed about shelter time.      Dimension #4 - Treatment Acceptance/Resistance - Risk 4.  Three felonies since 2011 and on probation at [facility].  Treatment is probation order. Patient reports he has to do 20 days shelter time in [place].  Pt is currently incarcerated. Dimension #5 - Relapse Potential - Risk 3. Patient reports the longest sobriety is 1 year but that was when he was locked up.  Patient said he never had clean UA in his life and now he is sober with clean UA.  Patient is incarcerated till 5/25.  Pt will resume treatment once he gets released. Increased stress related to shelter sentence.    Dimension #6 - Recovery Environment - Risk 2. Patient reports he reconciled with his father. Patient's father is allowing him housing, food, and " "transportation.  Pt talks about appreciation to his father but also admits they sometimes disagree on things.    T: Education Addiction on coping skills/stresss management    Discharge date is preliminary 6 months from the intake depending on patient's progress.  Intake was on 2/13/2017.  Patient completed 2 hours of CD/Outpaietn so far.      Psycho-Educational Curriculum  Date Attended  Psycho-Educational Curriculum  Date Attended    Acceptance   Shame/Guilt  3/2    1st Step   Anger/Rage  3/16, 3/21, 3/23    Affirmations   Mental Health     Automatic Negative Thoughts   Anxiety     Cross Addiction   Co-Occurring Disorders     Stages of Change   Inge/Bipolar     Relapse   Trauma  3/14, 3/16    Addictive Thoughts   Victim Identity     Coping Skills  5/2  Sober Structure     Relapse Prevention   Continuum of Care     Medical Aspects   Non-12 Step Support     Brain/Neurotransmitters  3/30  Priorities  4/11,4/13   Medication Compliance   Spirituality     VANNA Alcohol/Drug Research   Weekend Planner     Physical Health   Educational Videos     Post Acute Withdrawal   1st Step     Pregnancy and Drug Use   2nd Step     Sexual Health   Assertive Communication     Short-Term/Long-Term Effects  3/30  My name is Broderick CANO    Relationships  4/25, 4/27  Cross Addiction     Assertive Communication   God As We Understood Him     Boundaries   HBO Relapse     Codependence   HBO What Is Addiction     Defense Mechanisms   Medical Aspects 1     Family Roles   Medical Aspects 2     Goodbye Letter   National Geographic: Stress     Intimacy   PBS Depression Out of the Shadows     Needs/Dealbreakers in Relationships   The Anonymous People    Socialization Skills   [place]     Feelings   John Macedo \"Highjacked Brain\"    ABC Model of Emotion   Srinath Conti Humor in Tx    Grief and Loss   The Mindfulness Movie    Healthy vs. Unhealthy Feelings   Broderick CANO documentary     Meditation/Mindfulness   Health Goals    Overconfidence/Complacency   " Budget  2/21   Resentment       Stress

## 2021-06-10 NOTE — PROGRESS NOTES
Mental Health Visit Note    4/12/2017    Start time: 1240    Stop Time: 1300   Session # 2    Porfirio Clarke is a 51 y.o. male is being seen today for    Chief Complaint   Patient presents with      Follow Up     Anxiety     Depression     PTSD     New symptoms or complaints: depressed mood    Functional Impairment:   Personal: 2  Family: 3  Work: ssdi  Social:2    Clinical assessment of mental status: The patient was 40 minutes late for their scheduled session.  They were appropriately dressed with good grooming and hygiene.  The patient was oriented X3.  Patient was pleasant and cooperative.  Mood and affect were depressed, tearful and congruent with the content of his speech.   The patient made appropriate eye contact.  Recent and remote memories were intact.  Thought process was logical and linear.  Speech/language (tone and rate) were normal.  Insight and judgment were adequate.    Suicidal/Homicidal Ideation present: None Reported This Session    Patient's impression of their current status:  Patient apologized for tardiness to his appointment, he had times mixed up.  He expresses sadness over his situation and stressors.  He reports sobriety since last session.     Therapist impression of patients current state:  Patient was seen for an abbreviated session today given his tardiness.  It was recommended that he consider attending the Mental Health Processing Group as an alternative.  His thoughts/feelings were explored and validated.  Support was offered to the patient in the form of joining him as in Motivational Interviewing.  Referral was made for psychiatry in addition to Mental Health Processing Group, patient agreed to both.     Type of psychotherapeutic technique provided: Client centered and MI    Progress toward short term goals:Poor progress, patient did attend to part of the session, but effectiveness was limited by his tardiness.     Review of long term goals: Not done at today's  visit    Diagnosis:   1. Depression, major, recurrent, moderate    2. PTSD (post-traumatic stress disorder)    3. Methamphetamine use disorder, severe        Plan and Follow up: 1.  Patient to schedule for continued follow up psychotherapy appointments.    2.  Patient will use their crisis plan and/or access crisis services should symptoms       become worse.      3.  Patient to remain medication compliant.  4.  Patient to abstain from drugs and alcohol.  5.  Patient referred to psychiatry and mental health processing group.     Discharge Criteria/Planning: Patient will continue with follow-up until therapy can be discontinued without return of signs and symptoms.    Yobany Gutierrez 4/12/2017      This note was created with help of Dragon dictation software. Grammatical / typing errors are not intentional and inherent to the software.

## 2021-06-10 NOTE — PROGRESS NOTES
D) CD Outpatient/Service Coordination Group. Therapy dogs came in for the first 20 min.    A) Patient attended 3 hours of Service Coordination Group today.     R) Patient identified today s mood as 5 (0- not good, 10-excellent) and sober from meth since 2/10.    Group Check In question: what are you proud of? What are you appreciate? Pt said he was proud of not using today and appreciate his father for his support.    T) Education provided: Social Dimension

## 2021-06-10 NOTE — PROGRESS NOTES
"Weekly Progress Note  D: Patient attended 2/2 group this week. Patient was unable to attend the Tuesday group due to conflicting psychotherapy.    A: Staff facilitated groups and reviewed treatment progress. Assessed for [facility].   R: No VAP needed at this time. Patient working on the following dimensions:  Dimension #1 - Withdrawal Potential - Risk 0. Patient seems he is not counting the \"accidental meth use\" from several weeks ago and states he is sober sicnce he got into inpatient treatment, 2/10/2017.    Dimension #2 - Biomedical - Risk 1. Chronic pain, TIB. Patient was losing weight when he left his father's and stayed at friends. Pt reconciled with his father and moved back with him.   Pt gained healthy weight.  Pt stets \"My dad is mean kath but he feeds me really good.\"  Dimension #3 Emotional/Behavioral/Cognitive - Risk 2.  MH Histofy of Bipolar, depression, anxiety and PTSD reported. Patient completed DA with psychotherapist Yobany Flores on 3/28 and scheduled for weekly therapy session.   Patient admitted he was arrested for possession of burglary tools.  Pt said his intermediate UA came negative and even surprised the police officers who knew his drug history.  Pt states he has to do 20 days intermediate time from 5/5 to 5/25 at [facility].     Dimension #4 - Treatment Acceptance/Resistance - Risk 4.  Three felonies since 2011 and on probation at [facility].  Treatment is probation order. Patient reports he has to do 20 days intermediate time in [place].    Dimension #5 - Relapse Potential - Risk 2. Patient reports the longest sobriety is 1 year but that was when he was locked up.  Patient said he never had clean UA in his life and now he is sober with clean UA.  Despite of his difficult relationship with his father and recent arrest, patient reports he is maintaining sobriety.  Pt states current sobriety is the longest out of intermediate.    Dimension #6 - Recovery Environment - Risk 2. Patient reports he reconciled with his " "father. Patient's father is allowing him housing, food, and transportation.  Pt talks about appreciation to his father but also admits they sometimes disagree on things.    T: Education Addiction on Stoical Dimension    Discharge date is preliminary 6 months from the intake depending on patient's progress.  Intake was on 2/13/2017.  Patient completed 2 hours of CD/Outpaietn so far.      Psycho-Educational Curriculum  Date Attended  Psycho-Educational Curriculum  Date Attended    Acceptance   Shame/Guilt  3/2    1st Step   Anger/Rage  3/16, 3/21, 3/23    Affirmations   Mental Health     Automatic Negative Thoughts   Anxiety     Cross Addiction   Co-Occurring Disorders     Stages of Change   Inge/Bipolar     Relapse   Trauma  3/14, 3/16    Addictive Thoughts   Victim Identity     Coping Skills   Sober Structure     Relapse Prevention   Continuum of Care     Medical Aspects   Non-12 Step Support     Brain/Neurotransmitters  3/30  Priorities  4/11,4/13   Medication Compliance   Spirituality     VANNA Alcohol/Drug Research   Weekend Planner     Physical Health   Educational Videos     Post Acute Withdrawal   1st Step     Pregnancy and Drug Use   2nd Step     Sexual Health   Assertive Communication     Short-Term/Long-Term Effects  3/30  My name is Broderick CANO    Relationships  4/25, 4/27  Cross Addiction     Assertive Communication   God As We Understood Him     Boundaries   HBO Relapse     Codependence   HBO What Is Addiction     Defense Mechanisms   Medical Aspects 1     Family Roles   Medical Aspects 2     Goodbye Letter   National Geographic: Stress     Intimacy   PBS Depression Out of the Shadows     Needs/Dealbreakers in Relationships   The Anonymous People    Socialization Skills   [place]     Feelings   John Macedo \"Highjacked Brain\"    ABC Model of Emotion   Srinath Conti Humor in [place]x    Grief and Loss   The Mindfulness Movie    Healthy vs. Unhealthy Feelings   Broderick CANO documentary     Meditation/Mindfulness   Health " Goals    Overconfidence/Complacency   Budget  2/21   Resentment       Stress

## 2021-06-10 NOTE — PROGRESS NOTES
"Weekly Progress Note  D: Patient attended 2/2 group this week. Patient was unable to attend the Tuesday group due to conflicting psychotherapy.    A: Staff facilitated groups and reviewed treatment progress. Assessed for [facility].   R: No VAP needed at this time. Patient working on the following dimensions:  Dimension #1 - Withdrawal Potential - Risk 0. Patient seems he is not counting the \"accidental meth use\" from several weeks ago and states he is sober sicnce he got into inpatient treatment, 2/10/2017.    Dimension #2 - Biomedical - Risk 1. Chronic pain, TIB. Patient was losing weight when he left his father's and stayed at friends. Pt reconciled with his father and moved back with him.   Pt gained healthy weight.  Pt stets \"My dad is mean kath but he feeds me really good.\"  Dimension #3 Emotional/Behavioral/Cognitive - Risk 2.  MH Histofy of Bipolar, depression, anxiety and PTSD reported. Patient completed DA with psychotherapist Yobany Flores on 3/28.   Patient seems doing very well in group.  He participates and not nodding any more.  Pt seems to have clear mind and often jokes about his life with his father.  Pt verbalizes his appreciation to his father despite the past issues.    Dimension #4 - Treatment Acceptance/Resistance - Risk 3.  Three felonies since 2011 and on probation at [facility].  Treatment is probation order.   seems to be very understanding of patient's TBI and tendency to forget.  PO recommended treatment to be flexible for him.  PO is ware of patient's current marijuana use but considers meth sobriety as success.  PO recommends more services tailored to his TBI.    Dimension #5 - Relapse Potential - Risk 2. Patient reports the longest sobriety is 1 year but that was when he was locked up.  Patient said he never had clean UA in his life and now he is sober with clean UA.  Pt seems very proud of his sobriety.  Pt identifies his goal is to stay sober to welcome his girlfriend when " "she gets released form shelter.  Pt states he has been sending money to his girlfriend to support her.  Pt said he gave up all his money and his father is in charge.  Pt said his father is a typical Jewsih man and very good with money.  Pt laughed and said \"I trust my dad with my money.  He doesn't spend it for himself but saving it for me.  The problem is that hard to get my own money out from him.\"    Dimension #6 - Recovery Environment - Risk 2. Patient reports he reconciled with his father. Patient's father is allowing him housing, food, and transportation.    T: Education Addiction Problem and solutions   Discharge date is preliminary 6 months from the intake depending on patient's progress.  Intake was on 2/13/2017.  Patient completed 2 hours of CD/Outpaietn so far.      Psycho-Educational Curriculum  Date Attended  Psycho-Educational Curriculum  Date Attended    Acceptance   Shame/Guilt  3/2    1st Step   Anger/Rage  3/16, 3/21, 3/23    Affirmations   Mental Health     Automatic Negative Thoughts   Anxiety     Cross Addiction   Co-Occurring Disorders     Stages of Change   Inge/Bipolar     Relapse   Trauma  3/14, 3/16    Addictive Thoughts   Victim Identity     Coping Skills   Sober Structure     Relapse Prevention   Continuum of Care     Medical Aspects   Non-12 Step Support     Brain/Neurotransmitters  3/30  Priorities  4/11,4/13   Medication Compliance   Spirituality     VANNA Alcohol/Drug Research   Weekend Planner     Physical Health   Educational Videos     Post Acute Withdrawal   1st Step     Pregnancy and Drug Use   2nd Step     Sexual Health   Assertive Communication     Short-Term/Long-Term Effects  3/30  My name is Broderick Cordoba   Cross Addiction     Assertive Communication   God As We Understood Him     Boundaries   HBO Relapse     Codependence   HBO What Is Addiction     Defense Mechanisms   Medical Aspects 1     Family Roles   Medical Aspects 2     Goodbye Letter   National Geographic: " "Stress     Intimacy   PBS Depression Out of the Shadows     Needs/Dealbreakers in Relationships   The Anonymous People    Socialization Skills   [place]     Feelings   John Macedo \"Highjacked Brain\"    ABC Model of Emotion   Srinath Conti Humor in Tx    Grief and Loss   The Mindfulness Movie    Healthy vs. Unhealthy Feelings   Broderick CANO documentary     Meditation/Mindfulness   Health Goals    Overconfidence/Complacency   Budget  2/21   Resentment       Stress         "

## 2021-06-10 NOTE — PROGRESS NOTES
"D) CD Outpatient/Service Coordination Group  A) Patient attended 3 hours of Service Coordination Group today.   We had therapy dogs come in for the first 20 minutes.    R) Patient identified today s mood as 5 (0- not good, 10-excellent) and sober since he got into [facility].  Pt said he had to give three UA and swab test in the last couple days related to court and he is not using any chemicals at this time.    Group Check in 1.How did you spend your weekend?  Pt said he spent a boring weekend but was proud to be able to help out his cousin. Pt said his cousin was having a mental health problem and was finally placed on commitment.  Pt said he was worried about the cousin's safety and commitment gave him a relief.  Pt states went to [place] hearing yesterday and was sentenced to 2 year probation on 11 year passion time.  Pt said his living at his father is working fine at this time and planning to stay there.  Pt said living at his father helps to stay sober, knowing that the father will kick him out if he suspect of any use.  Pt said his father offered to let him and his girlfriend live when she get released from jail.    Group Check in 2.How did you cope with urges/cravings? Pt denied of having any urges stating having 11 years hanging over his head is serious enough to stay sober.    Group Check In 3. What goals are you working on right now?  \"to get my life back\"    T) Education provided: Identifying problems    "

## 2021-06-10 NOTE — PROGRESS NOTES
D) CD Outpatient/Service Coordination Group  A) Patient attended 3 hours of Service Coordination Group today.     R) Patient identified today s mood as 5 (0- not good, 10-excellent) and sober since 2/10.  Pt shared about the one time incident when he was given a smokeless cigarette and it had a meth in it.  Pt seems he decided he will not count that as slip because it was not by his choice.    Group Check in 1.  What positive thing did you do past weekend?  Pt shared many things that happened in the last one week.  Pt said he was arrested for moving erectronics from one department to the other inside [facility]. Pt said he was already on their face recognition and was arrested inside the store.  The police found a knife with him and he was charged with possession of burglary tools.  Pt said he was in senior care for 2 days and was given a bargain of getting the charge dropped if he confess his friend drug dealers in the city. Pt said he did not want to tip off anyone and he has not spoke to them for several months.  Pt said it will be very alarming for them to receive phone call from him out of blue.  Pt said he would not tip off any one to begin with.  Pt was worried about [place] probation violation.  Pt said he has to do 20 some days senior care time in early May, but was hoping that it will be nice if they allow him to chunk it apart.  Pt said he can lose his SSI benefit if he stayed confined more than 30 days consecutively.   This writer asked why he was at [facility].  Pt said he got into an argument with his father over a piece of bread.  Pt said he did not want this to go any further and stayed out of his house for couple days and went to trains and stores.  Pt said his father later apologized for his behavior.    Group Check in 2. What is your satisfaction on scale of 0-10 on your sobriety? Pt said he is very proud of his sobriety.  Pt said all police in [place] knows him and he was treated unfairly at the senior care. Pt said  "they suspected drug use and asked him to do UA. UA came positive and they acknowledge and commented that  \"meth is a hard drug to get off.\"   Group Check in 3.  What does your ideal day looks like? \"not seeing any \"   T) Education provided: Social Dimension. Finding out my values   "

## 2021-06-10 NOTE — PROGRESS NOTES
D)patient was no show to today's group  A)Pt previously reported he would be in [facility] at [facility] from 5/5/ to 5/25.    R)Reported to Jc Baltazar . PO said she has not heard form the patient yet and questioned whether he is out of [facility] or not.  PO said it might be hard to figure out things because he is in [place].    T)PO will figure out where he is.

## 2021-06-10 NOTE — PROGRESS NOTES
"D) CD Outpatient/Service Coordination Group  A) Patient attended 3 hours of Service Coordination Group today.     R) Patient identified today s mood as 4 (0- not good, 10-excellent) and sober since 2/10.     Group Check in 1.  How did you cope with stress recently?  Pt said he has been coping with stress by \"staying out of [place].\"    Group Check in 2. What is one thing you learned since you got sober? Pt said that sobriety is possible when he wants it.  Pt said he tried many other treatment where he was forced into.  Pt said this is a treatment ordered by probation, but he also wanted to change and it is working.    Group Check in 3.  What is one thing you got rid of since you got sober? Pt said he got rid of bills and old friends.    T) Education provided: Cross Addiction . Patient states he has hearing on Thursday 1 pm at [facility] and start his intermediate time in [place] on Friday.    "

## 2021-06-10 NOTE — PROGRESS NOTES
Mental Health Processing Group    Date of Service: 04/12/2017 Start Time: 2:00 Stop Time: 3:00 Total Time: 60 minutes  Frequency: Wednesdays    CPT: 78627    Care Provider: Roseann Cruz MA, [facility]  N= 4    Subjective: The patient was seen today for a 60 minute group psychotherapy session held at [facility].     Mental Status Exam:    Patient was pleasant and cooperative. The patient was oriented X4. The patient made good eye contact and was well dressed with good grooming and hygiene. Recent and remote memories were intact. Concentration and focus was normal. Speech (tone, volume, and rate) were intact. Mood and affect were congruently anxious but appropriate for the content of the session. Fund of knowledge was normal. Thought process was mostly logical and linear. Insight and judgment were intact. Patient denied SI, plan and/or means. Pt continues to be motivated for treatment.     Objective:    Patient was able to participate and benefit from treatment as evidenced by the patient s verbal expression and understanding of idea discussed.     New Symptoms or Complaints: The patient did not disclose any new symptoms to the undersigned.     Reason Patient is participating in the group: The group session was necessary for the care of the patient to address how symptoms impact the patient s mental health, relationships and overall well-being.      Patient's response to current intervention: Patient was receptive of feedback given, supportive of other group members and appears to have benefited from the group process. No barriers to learning evidenced.      Progress toward short-term goals: The patient completed the assigned homework which was to assess maladaptive thinking and its connections to the patient s pervious and/or current behaviors.     Patient's Impressions: The patient indicated readiness to learn by the patient s choice to attend group.     Review of long term goals: See treatment plan  developed by outpatient counselor.    Are there any new goals: There were no new goals brought to the undersigned s attention.      The patient completed today s group session for the processing of Mental Health Symptoms and its relationship to the patient s Chemical Dependency. Group content and interventions performed in this session: Check-in, the connection between thoughts, feelings, and behaviors. Group members also talked about getting through hard times and focusing on the moment.     Patient education on the above topics was provided during this session. The patient was given an opportunity to ask questions and participate in the group discussion. The patient exhibited individual understanding by asking relevant questions and making appropriate comments to other group members.    Patient indicated upon onset of group that his anxiety was a 9 out of 10. Patient reported he felt anxious.    Assessment:  1. Depression, major, recurrent, moderate    2. PTSD (post-traumatic stress disorder)    3. Methamphetamine use disorder, severe      No indication of SI, plan and/or means.     Providers Impression of Current Status:   Patient participated actively in today's group therapy session and appears to have learned the impact of one s thinking on feelings and behaviors. Patient will continue to benefit from ongoing psychotherapy that uses CBT and MI.     Psychotherapeutic Techniques: A cognitive behavioral modality was used.     PLAN:    Follow-up: Continue to attend groups to address mental health symptom(s)     Discharge Criteria Planning: Alleviation of mental health symptoms.     Encounter performed and documented by Roseann Cruz MA, [facility]

## 2021-06-10 NOTE — PROGRESS NOTES
"D) CD Outpatient/Service Coordination Group. Therapy dogs came in for the first 20 min.    A) Patient attended 3 hours of Service Coordination Group today.     R) Patient identified today s mood as 3(0- not good, 10-excellent) and sober since 2/10/2017.  Pt seems he decided to not count the accidental meth use from few weeks ago when he was staying at his friend Bobby.  Regardless of the details of the sobriety numbers, patient appears much healthier these days especially after he officially moved in with his father.  Pt states \"My dad feeds me well\" and patient seems he is gaining healthy weight.  Pt jokingly states that living at his father's is life living in [facility] because he must not use at all.  Pt said he is starting to understand his father more.  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  Group Check In question: What is the hardest thing you accomplished in life?  \"to submit clean UA\".  Pt said he never passed UAs in his life, and for the first time he is passing UA now with probation and court.    T) Education provided: creating solutions to the problems  "

## 2021-06-11 NOTE — PROGRESS NOTES
D)Yesenia called at 10 am asking if we had individual session scheduled today.    A)this writer told patient that we did not have any individual session.  Pt said that worked good because he as helping his sister move.    R)Patient did not show up to regular group today.

## 2021-06-11 NOTE — PROGRESS NOTES
D)Probation   A)reported to probation that patient has been missing groups since he got out of custodial on 5/25.  5/25-no show, 5/30-no show, 6/1-came to group only 1 hour, 6/6-no show, 6/8-attended group, 6/13-attended group but did not complete UA, 6/14-cancelled individual session to the next day, 6/15-no show to group and individual session.

## 2021-06-11 NOTE — PROGRESS NOTES
"Weekly Progress Note  D: Patient attended 1/2 group this week and cancelled 1:1 appointment on Friday   A: Staff facilitated groups and reviewed treatment progress. Assessed for [facility].   R: No VAP needed at this time. We could not over goals and methods in each dimensions this week because patient kept cancelling or did nos show up to individual session to discuss his treatment plan.   The original master treatment plan is still in place and active.   Patient working on the following dimensions:  Dimension #1 - Withdrawal Potential - Risk 1.  Pt came to Tuesday 6/20 group and shared that his last use of meth was on 6/4 when he got arrested. UA on 6/20 came negative.   No withdrawal concern reported or observed.    Dimension #2 - Biomedical - Risk 1. Chronic pain, TIB. Patient did not report any medical concern this week.  Patient was alert on 6/20 and stayed awake the whole group time.    Dimension #3 Emotional/Behavioral/Cognitive - Risk 3.  MH Histofy of Bipolar, depression, anxiety and PTSD reported. Patient completed DA with psychotherapist Yobany Flores on 3/28.  Patient continues to have problems with cars. Pt was no show on Thursday 6/22 and cancelled individual session on Friday 6/23.  Pt called on Friday morning and explained that he had a \"truck problem\" on Thursday. Pt explained that he had to move his storage items out of his aunt's apartment storage unit. Pt said his friend helped him move but the truck got broke on the highway.  Pt seeems to be constantly having issue with cars.    Dimension #4 - Treatment Acceptance/Resistance - Risk 4.  Three felonies since 2011 and on probation at [facility].  Treatment is probation order.   Dimension #5 - Relapse Potential - Risk 3. Patient reports the longest sobriety is 1 year but that was when he was locked up.   Last use reported on 6/4.  Patient has sporadic group attendance.    Dimension #6 - Recovery Environment - Risk 2. Patient lives at his father's. " "Patient's father is allowing him housing, food, and transportation.  Whenever patient has disagreement with his father, patient leaves the house and goes to his friends and places himself into more risk. This week, it is uncertain where he is living.    T: Education provided: People/Places/Things and CBT skills in relapse prevention     Discharge date needs to be revised at this time and discussed with patient.   Intake was on 2/13/2017.  Patient completed 50 hours of Service Coordination program since intake.        Psycho-Educational Curriculum  Date Attended  Psycho-Educational Curriculum  Date Attended    Acceptance   Shame/Guilt  3/2    1st Step   Anger/Rage  3/16, 3/21, 3/23    Affirmations   Mental Health     Automatic Negative Thoughts   Anxiety     Cross Addiction   Co-Occurring Disorders     Stages of Change   Inge/Bipolar     Relapse   Trauma  3/14, 3/16    Addictive Thoughts   Victim Identity     Coping Skills  5/2  Sober Structure     Relapse Prevention   Continuum of Care     Medical Aspects   Non-12 Step Support     Brain/Neurotransmitters  3/30  Priorities  4/11,4/13   Medication Compliance   Spirituality     VANNA Alcohol/Drug Research   Weekend Planner     Physical Health   Educational Videos     Post Acute Withdrawal   1st Step     Pregnancy and Drug Use   2nd Step     Sexual Health   Assertive Communication     Short-Term/Long-Term Effects  3/30  My name is Broderick CANO    Relationships  4/25, 4/27  Cross Addiction     Assertive Communication   God As We Understood Him     Boundaries   HBO Relapse     Codependence   HBO What Is Addiction     Defense Mechanisms   Medical Aspects 1     Family Roles   Medical Aspects 2     Goodbye Letter   National Geographic: Stress     Intimacy   PBS Depression Out of the Shadows     Needs/Dealbreakers in Relationships   The Anonymous People    Socialization Skills   [place]     Feelings   John Macedo \"Highjacked Brain\"    ABC Model of Emotion   Srinath Conti Humor in " [place]    Grief and Loss   The Mindfulness Movie    Healthy vs. Unhealthy Feelings   Broderick CANO documentary     Meditation/Mindfulness   Health Goals    Overconfidence/Complacency   Budget  2/21   Resentment   People/Places/Things  6/13   Stress   CBT  6/21

## 2021-06-11 NOTE — PROGRESS NOTES
Patient was unable to attend today's CD outpatient/Service Coordination group because he had a conflicting medical appointment at [facility]

## 2021-06-11 NOTE — PROGRESS NOTES
Weekly Progress Note  D: Patient attended 1/2 group this week and cancelled individual session   A: Staff facilitated groups and reviewed treatment progress. Assessed for [facility].   R: No VAP needed at this time. Patient working on the following dimensions:  Dimension #1 - Withdrawal Potential - Risk 0. Patient did not report any withdrawal concern this week.    Dimension #2 - Biomedical - Risk 1. Chronic pain, TIB. Patient did not report any medical concern this week.  Patient looked he lost weight.    Dimension #3 Emotional/Behavioral/Cognitive - Risk 3.  MH Histofy of Bipolar, depression, anxiety and PTSD reported. Patient completed DA with psychotherapist Yobany Flores on 3/28.  Patient was released from [facility] on 5/25 but did not attend 5/30 group.  Patient did not have any good reason to why he missed group.  Pt complained how his father has been treating him.  Pt came to group on 6/2 for the last 30 minutes. Pt explained that he was planning on attending group on time, but he got a call from a [employer] company stating his unit was broken in.  Pt cancelled individual session on Friday stating his car bot broken on [place] [address].    Dimension #4 - Treatment Acceptance/Resistance - Risk 4.  Three felonies since 2011 and on probation at [facility].  Treatment is probation order. Patient reports he has to do 20 days [facility] time in [place].  Pt is currently incarcerated. Dimension #5 - Relapse Potential - Risk 3. Patient reports the longest sobriety is 1 year but that was when he was locked up.  Patient was released from [facility] on Thursday 5/25 and did not show up to group till the followingThursday 6/1.    Dimension #6 - Recovery Environment - Risk 2. Patient lives at his father's. Patient's father is allowing him housing, food, and transportation.    T: Education provided: Patient only had a little over 30 minutes in group and education was hardly given.     Discharge date is preliminary 6 months from the intake depending on  "patient's progress.  Intake was on 2/13/2017.  Patient completed 2 hours of CD/Outpaietn so far.      Psycho-Educational Curriculum  Date Attended  Psycho-Educational Curriculum  Date Attended    Acceptance   Shame/Guilt  3/2    1st Step   Anger/Rage  3/16, 3/21, 3/23    Affirmations   Mental Health     Automatic Negative Thoughts   Anxiety     Cross Addiction   Co-Occurring Disorders     Stages of Change   Inge/Bipolar     Relapse   Trauma  3/14, 3/16    Addictive Thoughts   Victim Identity     Coping Skills  5/2  Sober Structure     Relapse Prevention   Continuum of Care     Medical Aspects   Non-12 Step Support     Brain/Neurotransmitters  3/30  Priorities  4/11,4/13   Medication Compliance   Spirituality     VANNA Alcohol/Drug Research   Weekend Planner     Physical Health   Educational Videos     Post Acute Withdrawal   1st Step     Pregnancy and Drug Use   2nd Step     Sexual Health   Assertive Communication     Short-Term/Long-Term Effects  3/30  My name is Broderick CANO    Relationships  4/25, 4/27  Cross Addiction     Assertive Communication   God As We Understood Him     Boundaries   HBO Relapse     Codependence   HBO What Is Addiction     Defense Mechanisms   Medical Aspects 1     Family Roles   Medical Aspects 2     Goodbye Letter   National Geographic: Stress     Intimacy   PBS Depression Out of the Shadows     Needs/Dealbreakers in Relationships   The Anonymous People    Socialization Skills   [place]     Feelings   John Macedo \"Highjacked Brain\"    ABC Model of Emotion   Srinath Conti Humor in Tx    Grief and Loss   The Mindfulness Movie    Healthy vs. Unhealthy Feelings   Broderick CANO documentary     Meditation/Mindfulness   Health Goals    Overconfidence/Complacency   Budget  2/21   Resentment       Stress         "

## 2021-06-11 NOTE — PROGRESS NOTES
Weekly Progress Note  D: Patient attended 1/2 group this week and cancelled and did not show up to individual session twice.     A: Staff facilitated groups and reviewed treatment progress. Assessed for [facility].   R: No VAP needed at this time. We could not over goals and methods in each dimensions this week because patient kept cancelling or did nos show up to individual session to discuss his treatment plan.   The original master treatment plan is still in place and active.   Patient working on the following dimensions:  Dimension #1 - Withdrawal Potential - Risk 1. Although patient did not report active drug use, patient seems he might have been using. Pt admitted he was using meth and got arrested on Sunday 6/4. When he attended group on 6/13, patient was nodding and appeared very tired.  Pt was asked to do UA on that day but did not do so.      Dimension #2 - Biomedical - Risk 1. Chronic pain, TIB. Patient did not report any medical concern this week.  Patient looked he lost weight since he got out of assisted on 5/25.    Dimension #3 Emotional/Behavioral/Cognitive - Risk 3.  MH Histofy of Bipolar, depression, anxiety and PTSD reported. Patient completed DA with psychotherapist Yobany Flores on 3/28.  Patient seems he is actively using.  Since he got out of assisted in 5/25, situation got worse. Pt was arrested on 6/4 for possesion of drug.  Patient refused UA on 6/13 and cancelled individual session on 6/14.  Pt was no show to group and individual session on 6/15.    Dimension #4 - Treatment Acceptance/Resistance - Risk 4.  Three felonies since 2011 and on probation at [facility].  Treatment is probation order.   Dimension #5 - Relapse Potential - Risk 3. Patient reports the longest sobriety is 1 year but that was when he was locked up.   Patient does not admit active drug use, but his behavior is presenting active use.    Dimension #6 - Recovery Environment - Risk 3. Patient lives at his father's. Patient's father is  "allowing him housing, food, and transportation.  Whenever patient has disagreement with his father, patient leaves the house and goes to his friends and places himself into more risk. This week, it is uncertain where he is living.    T: Education provided: People/Places/Things.      Discharge date needs to be revised at this time and discussed with patient.   Intake was on 2/13/2017.  Patient completed 47 hours of Service Coordination program since intake.        Psycho-Educational Curriculum  Date Attended  Psycho-Educational Curriculum  Date Attended    Acceptance   Shame/Guilt  3/2    1st Step   Anger/Rage  3/16, 3/21, 3/23    Affirmations   Mental Health     Automatic Negative Thoughts   Anxiety     Cross Addiction   Co-Occurring Disorders     Stages of Change   Inge/Bipolar     Relapse   Trauma  3/14, 3/16    Addictive Thoughts   Victim Identity     Coping Skills  5/2  Sober Structure     Relapse Prevention   Continuum of Care     Medical Aspects   Non-12 Step Support     Brain/Neurotransmitters  3/30  Priorities  4/11,4/13   Medication Compliance   Spirituality     VANNA Alcohol/Drug Research   Weekend Planner     Physical Health   Educational Videos     Post Acute Withdrawal   1st Step     Pregnancy and Drug Use   2nd Step     Sexual Health   Assertive Communication     Short-Term/Long-Term Effects  3/30  My name is Broderick CANO    Relationships  4/25, 4/27  Cross Addiction     Assertive Communication   God As We Understood Him     Boundaries   HBO Relapse     Codependence   HBO What Is Addiction     Defense Mechanisms   Medical Aspects 1     Family Roles   Medical Aspects 2     Goodbye Letter   National Geographic: Stress     Intimacy   PBS Depression Out of the Shadows     Needs/Dealbreakers in Relationships   The Anonymous People    Socialization Skills   [place]     Feelings   John Macedo \"Highjacked Brain\"    ABC Model of Emotion   Srinath Conti Humor in Tx    Grief and Loss   The Mindfulness Movie  "   Healthy vs. Unhealthy Feelings   Broderick CANO documentary     Meditation/Mindfulness   Health Goals    Overconfidence/Complacency   Budget  2/21   Resentment   People/Places/Things  6/13   Stress
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
## 2021-06-11 NOTE — PROGRESS NOTES
Weekly Progress Note  D: Patient attended 2/3 group this week    A: Staff facilitated groups and reviewed treatment progress. Assessed for [facility].   R: No VAP needed at this time. We could not over goals and methods in each dimensions this week because patient kept cancelling or did nos show up to individual session to discuss his treatment plan.   The original master treatment plan is still in place and active.   Patient working on the following dimensions:  Dimension #1 - Withdrawal Potential - Risk 1.  Pt came to Tuesday 6/20 group and shared that his last use of meth was on 6/4 when he got arrested. UA on 6/20 came negative.   No withdrawal concern reported or observed this week.     Dimension #2 - Biomedical - Risk 2. Chronic pain, TIB. Pt reported increased pain on his neck and previous infected bone.  Pt had a scheduled doctor visit at [facility] on Thursday 6/29 to check his neck.    Dimension #3 Emotional/Behavioral/Cognitive - Risk 3.  MH Histofy of Bipolar, depression, anxiety and PTSD reported. Patient completed DA with psychotherapist Yobany Flores on 3/28. Pt seems finally sober and it is showing in his outlook.  Pt seems he is gaining weight back and seemed healthier.  Pt also attended groups on Tuesday and Wednesday and showing increased sense of accountability.  Pt reported his friend Caren got arrested while at [facility].  Pt said he believed she was trying to steal something and got caught.  Pt said he explained to the police that he had nothing to do with her activity and did not get into trouble.  Pt states he is seriously thinking of looking for housing.    Dimension #4 - Treatment Acceptance/Resistance - Risk 4.  Three felonies since 2011 and on probation at [facility].  Treatment is probation order.   Dimension #5 - Relapse Potential - Risk 3. Patient reports the longest sobriety is 1 year but that was when he was locked up.   Last use reported on 6/4.  Pt came to  "groups this week. Pt seems he has good sobriety to think straight.     Dimension #6 - Recovery Environment - Risk 2. Pt reported he wants to move out of his father's house because he is a \"jerk\".  Pt jokingly and affectionately talks about his father.  It seems his father has advice to patient but patient constantly and patient has different opinions.     T: Education provided: CBT skills in relapse prevention     Discharge date needs to be revised at this time and discussed with patient.   Intake was on 2/13/2017.  Patient completed 50 hours of Service Coordination program since intake.        Psycho-Educational Curriculum  Date Attended  Psycho-Educational Curriculum  Date Attended    Acceptance   Shame/Guilt  3/2    1st Step   Anger/Rage  3/16, 3/21, 3/23    Affirmations   Mental Health     Automatic Negative Thoughts   Anxiety     Cross Addiction   Co-Occurring Disorders     Stages of Change   Inge/Bipolar     Relapse   Trauma  3/14, 3/16    Addictive Thoughts   Victim Identity     Coping Skills  5/2  Sober Structure     Relapse Prevention   Continuum of Care     Medical Aspects   Non-12 Step Support     Brain/Neurotransmitters  3/30  Priorities  4/11,4/13   Medication Compliance   Spirituality     VANNA Alcohol/Drug Research   Weekend Planner     Physical Health   Educational Videos     Post Acute Withdrawal   1st Step     Pregnancy and Drug Use   2nd Step     Sexual Health   Assertive Communication     Short-Term/Long-Term Effects  3/30  My name is Broderick CANO    Relationships  4/25, 4/27  Cross Addiction     Assertive Communication   God As We Understood Him     Boundaries   HBO Relapse     Codependence   HBO What Is Addiction     Defense Mechanisms   Medical Aspects 1     Family Roles   Medical Aspects 2     Goodbye Letter   National Geographic: Stress     Intimacy   PBS Depression Out of the Shadows     Needs/Dealbreakers in Relationships   The Anonymous People    Socialization Skills   [place]     Feelings   " "John Macedo \"Highjacked Brain\"    ABC Model of Emotion   Srinath Conti Humor in [place]    Grief and Loss   The Mindfulness Movie    Healthy vs. Unhealthy Feelings   Broderick CANO documentary     Meditation/Mindfulness   Health Goals    Overconfidence/Complacency   Budget  2/21   Resentment   People/Places/Things  6/13   Stress   CBT  6/21, 6/27, 6/28     "
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
## 2021-06-12 NOTE — PROGRESS NOTES
Patient called before the CD outpatient/service coordination group started, informed he would arrive 1 hour late.  Pt never showed up.

## 2021-06-12 NOTE — PROGRESS NOTES
Note:  Ct has completed 73 hours of Service Coordination  Rach Nassar MA, [facility]
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
## 2021-06-12 NOTE — PROGRESS NOTES
D)This writer called patient to schedule 1:1 because he missed last two 1:1s  A)Pt said he was just by the hospital and able to come in  R)Met with patient to discuss his progress.  Pt has been turning clean UAs.  Pt has Traumatic Brain Injury.  Pt reports it is very difficult for him to remember dates and time.  Pt said he was just looking for his cell phone for 10 minutes, but it turned out he actually spent an hour and half.  Pt talked about his depression.  Pt reports he completed Diagnostic assessment in March and met with his therapist once but never followed up after that.  We walked over to the outpatient mental health clinic reception and scheduled therapy appointment for 9/21.  We called his  .  PO was concerned of patient's forgetfulness and said she believed he needs some type of home care or .  Explained to PO that due to Medical being the primary care, getting home care service and [facility] worker is difficult unless patient is bed ridden.  Patient will need his therapist's referral for [facility] .  We were not sure if he qualifies for Medical Assistance (as secondary insurance ) as secondary insurance since his monthly income is quite high.  He will need proof of income (such as SSI letter and back account balance).  Pt said he only has SSI letter from several years ago.  We tried to create Social Security Online account so he can review his benefit, however it was unsuccessful due to lack of documents.  Pt reported another problem related to Medicare. Pt said he cortez snot have Medicare Part D and has not been on any medications because he cannot afford it.  We went lonely to search Medicare D coverage, however it also asked patient's Medicare start date, which he did not know.  Pt lost his Medicare ID Card.  Pt said it is easy for him to order Medicare ID card and he would do it today.
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
## 2021-06-13 NOTE — PROGRESS NOTES
D)patient was no call and no show to both individual session and group today.   A) Linda Gallegos said she met with Porfirio yesterday and he was aware of the both meeting today.
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
## 2021-06-13 NOTE — PROGRESS NOTES
D)Patient attended 1 hours of CD Outpatient/Service Coordination-recreational group.    A)Patient arrived at close to 1pm for group that started at 11:30am.    R)Pt said he forgets time/date easily due to brain damage. Probation called and rescheduled meeting to this Friday 9/29 at 1pm

## 2021-06-13 NOTE — PROGRESS NOTES
D)Patient attended 3 hours of CD Outpatient/Service Coordination group today.    A) Patient was making effort to get UA done.   Pt was informed that UA orders are curently backed up and he can skip UAs until further notice.

## 2021-06-13 NOTE — PROGRESS NOTES
Pt left a brief phone message at 7 pm stating he was unable to attend group today.  Pt said he just got released.  It was unsure what he was released from.
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
## 2021-06-13 NOTE — PROGRESS NOTES
D)Patient came to group for 10 minutes  A)Pt was sitting on the bench by the clinic door around 2:30pm.  Patient said he came to the group.  The group was just returning from the UA lab.  Pt said he would go to the UA lab too.   T)Pt came back to group room at 3:20pm.

## 2021-06-13 NOTE — PROGRESS NOTES
Weekly Progress Note  D: Patient attended 2/3 group this week    A: Staff facilitated groups and reviewed treatment progress. Assessed for [facility].   R: No VAP needed at this time.  The original master treatment plan is still in place and active.   Patient working on the following dimensions:  Dimension #1 - Withdrawal Potential - Risk 0.  Last positive UA was on 6/8.    This week's UA was clean.  No withdrawal concern reported or observed this week.     Dimension #2 - Biomedical - Risk 1. Chronic pain, neck pain, TIB. 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  Patient has health insurance and able to access medical care.    Dimension #3 Emotional/Behavioral/Cognitive - Risk 3.  [facility] Histofy of Bipolar, depression, anxiety and PTSD reported.  Patient sees psychotherapist Yobany Flores   Pt came to Tuesday group when it was only 15 min left.  Pt  was told to start attending on time.  Pt came to Wednesday group one hour late but came to Thursday group for 3 hours.  Probation is aware of TBI and difficulty to remember.  Pt admits he mixes up dates and time.  Pt thought it was still August.  PO suggested [facility] worker and/or mental health .  It turned out overqualifies for MA or PMAP.     Dimension #4 - Treatment Acceptance/Resistance - Risk 3.  Three felonies since 2011 and on probation at [facility].  Patient is showing sings of increased accountability as evidenced by coming to groups both days.  Patient has been struggling to attend groups on time in recent weeks.    Dimension #5 - Relapse Potential - Risk 2.  UA shows patient is sober.  Pt participated in discussion well this week. Pt seemed organized with his thoughts.    Dimension #6 - Recovery Environment - Risk 2. Pt no longer reporting relationship issues with his father and states things are working out fine.  Patient hs vehicle and able to access community support groups   T: Education provided: Identifying Feelings    Discharge date  "needs to be revised at this time and discussed with patient.   Intake was on 2/13/2017.  Patient completed 95 hours of Service Coordination program since intake.        Psycho-Educational Curriculum  Date Attended  Psycho-Educational Curriculum  Date Attended    Acceptance   Shame/Guilt  3/2    1st Step   Anger/Rage  3/16, 3/21, 3/23    Affirmations   Mental Health     Automatic Negative Thoughts   Anxiety     Cross Addiction  7/18, 7/20 Co-Occurring Disorders     Stages of Change   Inge/Bipolar     Relapse   Trauma  3/14, 3/16    Addictive Thoughts   Victim Identity     Coping Skills  5/2  Sober Structure     Relapse Prevention   Continuum of Care     Medical Aspects   Non-12 Step Support     Brain/Neurotransmitters  3/30  Priorities  4/11,4/13   Medication Compliance   Spirituality     VANNA Alcohol/Drug Research   Weekend Planner     Physical Health   Educational Videos     Post Acute Withdrawal   1st Step     Pregnancy and Drug Use   2nd Step     Sexual Health   Assertive Communication     Short-Term/Long-Term Effects  3/30  My name is Broderick CANO    Relationships  4/25, 4/27  Cross Addiction     Assertive Communication   God As We Understood Him     Boundaries   HBO Relapse     Codependence   HBO What Is Addiction     Defense Mechanisms   Medical Aspects 1     Family Roles   Medical Aspects 2     Goodbye Letter   National Geographic: Stress     Intimacy   PBS Depression Out of the Shadows     Needs/Dealbreakers in Relationships   The Anonymous People    Socialization Skills   [place]  7/11, 7/13   Feelings   John Macedo \"Highjacked Brain\"    ABC Model of Emotion   Srinath Conti Humor in Tx    Grief and Loss   The Mindfulness Movie    Healthy vs. Unhealthy Feelings   Goal Setting  8/10   Meditation/Mindfulness  9/7, 9/12, 9/14 Health Goals    Overconfidence/Complacency   Budget  2/21   Resentment   People/Places/Things  6/13   Stress  7/25, 7/27 CBT  6/21, 6/27, 6/28     "

## 2021-06-13 NOTE — PROGRESS NOTES
D)Contacted [employer] insurance expert on patient's potential eligibility for MA.    T) Income limit for Medical Assistance is $1000/month.  Pt does not qualify.

## 2021-06-13 NOTE — PROGRESS NOTES
D)patient called after the group started.   A)Pt said he was meeting with his  this afternoon but said he will come to group  R)Pt came to group at 3:15pm when the group was just finishing up.
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
## 2021-06-14 NOTE — PROGRESS NOTES
Weekly Progress Note  Porfirio Clarke  1965  [id number]      D) Pt attended 1/2 groups this week with 1 absence. Patient had one ndividual sessions this week. A) Staff facilitated groups and reviewed tx progress. Assessed for [facility]. R) No VAP needed at this time.   Any significant events, defines as events that impact patients relationship with others inside and outside of treatment No   Indicate any changes or monitoring of physical or mental health problems No     Indicate involvement by any outside supports No  IAPP reviewed and modified as needed. Yes  Pt working on the following dimensions:  Dimension #1 - Withdrawal Potential - Risk 0. Pt admitted he smoked small amount of marijuana 2 weeks ago, but staying sober from meth.  UA was asked on 11/16.  No withdrawal observed or reported by patient.      Specific goals from treatment plan addressed this week: Remain sober     Effectiveness of strategies: effective     Dimension #2 - Biomedical - Risk 1. TBI, chronic neck pain.  Patient does not have primary care provider.  Pt hs Medicare A and B.  Patient has been living in his van with another homeless friend, but decided to remove her from his van today. Pt seemed lacking personal hygiene and had a small of urin this week.        Specific goals from treatment plan addressed this week: Establish primary care.  Sign up for Medicare D     Effectiveness of strategies: ineffective.  Pt was scheduled for 1:1 in the past but missed them.  1:1 scheduled for 11/21 again.      Dimension #3 - Emotional/Behavioral/Cognitive - Risk 3. Patient has been living in a van to help out his friend Teresa.  Pt said Teresa took 6 month prison sentence and not report him.  Pt said he felt bad for her when she became homeless and decided to let her live in his van. Pt said his father and girlfriend both disliked Teresa.  Pt said he finally decided to ask her to move out because she was bringing more problem for him.      Specific goals  from treatment plan addressed this week:attend group and reschedule 1:1 as well     Effectiveness of strategies: Effective     Dimension #4 - Treatment Acceptance/Resistance - Risk 3. UA supports patient is sboer but his group attendance dropped dramatically since August.  Probation states this is still a goo progress for Porfirio.      Specific goals from treatment plan addressed this week:  Continue to remain contact with probation and not add any new criminal charges. Continue to deal with the pending charge and follow 's order.      Effectiveness of strategies: effective     Dimension #5 - Relapse Potential - Risk 1. According to UA results, patient has been sober since June.  Pt reports he smoked marijuana 2 weeks ago but staying sober from meth.  Pt talked a lot about guilt on kicking out Teresa.  Patient will need to reschedule therapy appointments.      Specific goals from treatment plan addressed this week: Mental health management and meet with therapist Yobany Gutierrez     Effectiveness of strategies: in progress     Dimension #6 - Recovery Environment - Risk 3. Pt has been living in a van with his homeless friend Teresa. Pt said he asked her to move out on 11/16.  Pt said he contacted a sober house and hoping to move in on 12/1. Pt states he will live at his father's for the time being.        Specific goals from treatment plan addressed this week: Patient to go back to his father's     Effectiveness of strategies: in progress     T) Treatment plan updated no.  Patient notified and in agreement Yes.  Patient educated on Relapse Prevention.   Intake was on 2/13/2017. Patient has completed 96 hours at this time. Projected discharge date has changed due to patient's non-attendance. Current discharge plan is TBD.     CYNTHIA Evans  11/16/2017, 4:17 PM         Psycho-Educational Curriculum  Date Attended  Psycho-Educational Curriculum  Date Attended    Acceptance   Shame/Guilt  3/2    1st Step    "Anger/Rage  3/16, 3/21, 3/23    Affirmations   Mental Health     Automatic Negative Thoughts   Anxiety     Cross Addiction  7/18, 7/20 Co-Occurring Disorders     Stages of Change   Inge/Bipolar     Relapse  11/16 Trauma  3/14, 3/16    Addictive Thoughts   Victim Identity     Coping Skills  5/2  Sober Structure     Relapse Prevention   Continuum of Care     Medical Aspects   Non-12 Step Support     Brain/Neurotransmitters  3/30  Priorities  4/11,4/13   Medication Compliance   Spirituality     VANNA Alcohol/Drug Research   Weekend Planner     Physical Health   Educational Videos     Post Acute Withdrawal   1st Step     Pregnancy and Drug Use   2nd Step     Sexual Health   Assertive Communication     Short-Term/Long-Term Effects  3/30  My name is Broderick CANO    Relationships  4/25, 4/27  Cross Addiction     Assertive Communication   God As We Understood Him     Boundaries   HBO Relapse     Codependence   HBO What Is Addiction     Defense Mechanisms   Medical Aspects 1     Family Roles   Medical Aspects 2     Goodbye Letter   National Geographic: Stress     Intimacy   PBS Depression Out of the Shadows     Needs/Dealbreakers in Relationships   The Anonymous People    Socialization Skills   [place]  7/11, 7/13   Feelings   John Macedo \"Highjacked Brain\"    ABC Model of Emotion   Srinath Conti Humor in Tx    Grief and Loss   Money/Budget  10/24    Healthy vs. Unhealthy Feelings   Goal Setting  8/10   Meditation/Mindfulness  9/7, 9/12, 9/14 Health Goals    Overconfidence/Complacency   Budget  2/21   Resentment   People/Places/Things  6/13   Stress  7/25, 7/27 CBT  6/21, 6/27, 6/28     "

## 2021-06-14 NOTE — PROGRESS NOTES
D) Pt was no show to today's individual session at 10 am.    A)He called at 11:30 am and said he was stuck in [place] and won't be able to come to group either.    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  Pt said he cleaned up the house before it goes on the market and expected to get paid today but the person has not arrived.    T)1:1 rescheduled for tomorrow at 10 am.

## 2021-06-14 NOTE — PROGRESS NOTES
"D)Patient missed group attendance today.  However he was sitting at the clinic and waiting to meet with this writer.   A)Pt was asked to come into the office.  As soon as the door closed, patient could smell of urin.  Pt was asked how often he takes shower.  Pt said he just took shower this morning.   We discussed his last 3 weeks of absence from CD treatment. This writer explained to patient that probation and treamtment are trying to help him but they cannot do anything unless he shows up. Pt admitted that he has been homeless with friend Stacy.  Pt said his girlfriend in snf does not like Stacy and so as his father. Pt said he does not know why but he feels bad abut her situation so he hangs out with her and living in a van.  Pt said he has been smoking but not meth. This writer asked him to do UA.  Evan taylor told that [facility] writer was about to make a decision to discharge him by next week. Pt said he wants to get back to the treatment and promised he would show up to every group and try to be on time. Pt was told that probation and treatment do not believe it is safe or healthy for him to be homeless when he can go to his father's.  This writer asked about what other things are keeping him to be with Stacy.  Pt said \"nothing\".  Pt said \"Stacy is like and anchor and keeping me down.\"  We talked about pros and cons of this relationship.  Pt seems to understand that it is better for him to get away.    T) Pt promised he would start group this Thursday.    "

## 2021-06-14 NOTE — PROGRESS NOTES
Weekly Progress Note  Porfirio Clarke  1965  [id number]      D) Late entry from 11/23/17.  Pt attended 1/1 groups this week with no absence. Patient had one ndividual session but he was no show this week. A) Staff facilitated groups and reviewed tx progress. Assessed for VA. R) No VAP needed at this time.   Any significant events, defines as events that impact patients relationship with others inside and outside of treatment No   Indicate any changes or monitoring of physical or mental health problems No     Indicate involvement by any outside supports No  IAPP reviewed and modified as needed. Yes  Pt working on the following dimensions:  Dimension #1 - Withdrawal Potential - Risk 0. UA coming back as clean.  No signs of WA reported or observed.      Specific goals from treatment plan addressed this week: Remain sober     Effectiveness of strategies: effective     Dimension #2 - Biomedical - Risk 1. TBI, chronic neck pain.  Patient does not have primary care provider.  Pt hs Medicare A and B.  Pt is able to access medical care.  Pt was seen with improved personal hygiene this week.      Specific goals from treatment plan addressed this week: Establish primary care.  Sign up for Medicare D     Effectiveness of strategies: ineffective.  Pt was scheduled for 1:1 in the past but missed them.  1:1 scheduled for 11/21 again.      Dimension #3 - Emotional/Behavioral/Cognitive - Risk 3. Patient was late but he attended 1 hour of group this week.  Patient's personal hygiene improved and there was no small of urin.  Pt states he is living at his father's for now but he may have found a place to live already.  Pt states he ran into an old friend who was looking for a roommate. Pt said he was told he can move in on December 1st, but he maybe able to move in earlier.        Specific goals from treatment plan addressed this week:attend group and reschedule 1:1 as well     Effectiveness of strategies: Effective     Dimension #4  - Treatment Acceptance/Resistance - Risk 3. UA supports patient is sboer but patient's attendance have been historically poor.    Probation states this is still a goo progress for Porfirio.      Specific goals from treatment plan addressed this week:  Continue to remain contact with probation and not add any new criminal charges. Continue to deal with the pending charge and follow 's order.      Effectiveness of strategies: effective     Dimension #5 - Relapse Potential - Risk 1. According to UA results, patient has been sober since June.  Pt admitted he smoked small amount of marijuana but it did not show on his UA.  Patient missed too many therapy appointment at [facility] and may have to find a new therapist somewhere else.      Specific goals from treatment plan addressed this week: get a new therapist    Effectiveness of strategies: in progress     Dimension #6 - Recovery Environment - Risk 3. No longer living in his van.  Pt living at his father's and eating more food. Pt reports he may move into his own room by December 1st.      Specific goals from treatment plan addressed this week: Patient to secure place to live     Effectiveness of strategies: in progress     T) Treatment plan updated no.  Patient notified and in agreement Yes.  Patient educated on Relapse Prevention.   Intake was on 2/13/2017. Patient has completed 96 hours at this time. Projected discharge date has changed due to patient's non-attendance. Current discharge plan is TBD.     CYNTHIA Evans  11/27/2017, 10:25 AM         Psycho-Educational Curriculum  Date Attended  Psycho-Educational Curriculum  Date Attended    Acceptance   Shame/Guilt  3/2    1st Step   Anger/Rage  3/16, 3/21, 3/23    Affirmations   Mental Health     Automatic Negative Thoughts   Anxiety     Cross Addiction  7/18, 7/20 Co-Occurring Disorders     Stages of Change   Inge/Bipolar     Relapse  11/16 Trauma  3/14, 3/16    Addictive Thoughts   Victim Identity    "  Coping Skills  5/2  Sober Structure     Relapse Prevention   Continuum of Care     Medical Aspects   Non-12 Step Support     Brain/Neurotransmitters  3/30  Priorities  4/11,4/13   Medication Compliance   Spirituality     VANNA Alcohol/Drug Research   Weekend Planner     Physical Health   Educational Videos     Post Acute Withdrawal   1st Step     Pregnancy and Drug Use   2nd Step     Sexual Health   Assertive Communication     Short-Term/Long-Term Effects  3/30  My name is Broderick CANO    Relationships  4/25, 4/27  Cross Addiction     Assertive Communication   God As We Understood Him     Boundaries   HBO Relapse     Codependence   HBO What Is Addiction     Defense Mechanisms   Medical Aspects 1     Family Roles   Medical Aspects 2     Goodbye Letter   National Geographic: Stress     Intimacy   PBS Depression Out of the Shadows     Needs/Dealbreakers in Relationships   The Anonymous People    Socialization Skills   [place]  7/11, 7/13   Feelings   John Macedo \"Highjacked Brain\"    ABC Model of Emotion   Srinath Conti Humor in Tx    Grief and Loss   Money/Budget  10/24    Healthy vs. Unhealthy Feelings   Goal Setting  8/10   Meditation/Mindfulness  9/7, 9/12, 9/14 Health Goals    Overconfidence/Complacency   Budget  2/21   Resentment   People/Places/Things  6/13   Stress  7/25, 7/27 CBT  6/21, 6/27, 6/28     "

## 2021-06-14 NOTE — PROGRESS NOTES
Weekly Progress Note  Porfirio Clarke  1965  [id number]      D)Pt attended 1/2 groups this week with no absence. Patient had one individual session but he was no show this week. A) Staff facilitated groups and reviewed tx progress. Assessed for [facility]. R) No VAP needed at this time.   Any significant events, defines as events that impact patients relationship with others inside and outside of treatment No   Indicate any changes or monitoring of physical or mental health problems No     Indicate involvement by any outside supports No  IAPP reviewed and modified as needed. No   Pt working on the following dimensions:  Dimension #1 - Withdrawal Potential - Risk 0. UA coming back as clean.  No signs of WA reported or observed.  UA was clean this week.     Specific goals from treatment plan addressed this week: Remain sober     Effectiveness of strategies: effective     Dimension #2 - Biomedical - Risk 1. TBI, chronic neck pain.  Patient does not have primary care provider.  Pt hs Medicare A and B.  Pt is able to access medical care. Patient successfully enrolled in Medicare Part D, Silver Script for 2018.  Patient will need to establish primary care.  Pt was scheduled for primary care visit at [facility]. Date: 12/28 at 4pm with Dr. Garza.  Patient's  is helping patient to get to [facility] to see if he can qualify secondary insurance.      Specific goals from treatment plan addressed this week: Establish primary care.      Effectiveness of strategies: Effective.     Dimension #3 - Emotional/Behavioral/Cognitive - Risk 2.  Patient has been attending groups regularly and he calls in if he cannot make to group.  It seems having the structure is helping patient to stay healthy.  Pt continues to have many legal issues and daily struggle with housing/vehicles/relationship.    Specific goals from treatment plan addressed this week:attend group and reschedule 1:1 as well     Effectiveness  of strategies: Effective     Dimension #4 - Treatment Acceptance/Resistance - Risk 1. UA supports patient is sboer and he is showing signs of increased accountability as evidenced by attending groups twice a week and caring for his legal responsibilities.      Specific goals from treatment plan addressed this week:  Continue to remain contact with probation and not add any new criminal charges. Continue to deal with the pending charge and follow 's order.      Effectiveness of strategies: effective     Dimension #5 - Relapse Potential - Risk 1. According to UA results, patient has been sober since June.  Needs including working on impulse control and learning to eliminate all relapse triggers.  Patient seems constantly helping others an ending up getting into trouble.      Specific goals from treatment plan addressed this week: get a new therapist    Effectiveness of strategies: in progress     Dimension #6 - Recovery Environment - Risk 2. Pt reports he has an access to his room where he pays rent but he prefers staying in his van if the roommate is not home.  Pt states he does not want to get accused of something missing and he rather stay outside in cold.  Patient has supportive father.  Pt is learning to set boundary with certain friends.      Specific goals from treatment plan addressed this week: Patient to secure place to live     Effectiveness of strategies: in progress     T) Treatment plan updated no.  Patient notified and in agreement Yes.  Patient educated on Holiday and staying sober.   Intake was on 2/13/2017. Patient has completed 115 hours at this time. Projected discharge date has changed due to patient's non-attendance. Current discharge plan is TBD.     Heike Nagel [facility]  12/22/2017, 9:42 AM         Psycho-Educational Curriculum  Date Attended  Psycho-Educational Curriculum  Date Attended    Acceptance   Shame/Guilt  3/2    1st Step   Anger/Rage  3/16, 3/21, 3/23    Affirmations   Mental  "Health     Automatic Negative Thoughts   Anxiety     Cross Addiction  7/18, 7/20 Co-Occurring Disorders     Stages of Change   Inge/Bipolar     Relapse  11/16 Trauma  3/14, 3/16    Addictive Thoughts   Victim Identity     Coping Skills  5/2  Sober Structure     Relapse Prevention   Continuum of Care     Medical Aspects   Non-12 Step Support     Brain/Neurotransmitters  3/30  Priorities  4/11,4/13   Medication Compliance   Spirituality  11/28/17   VANNA Alcohol/Drug Research   Weekend Planner     Physical Health   Educational Videos     Post Acute Withdrawal   1st Step     Pregnancy and Drug Use   2nd Step     Sexual Health   Assertive Communication     Short-Term/Long-Term Effects  3/30  My name is Broderick CANO    Relationships  4/25, 4/27  Cross Addiction     Assertive Communication   God As We Understood Him     Boundaries   HBO Relapse     Codependence   HBO What Is Addiction     Defense Mechanisms   Medical Aspects 1     Family Roles   Medical Aspects 2     Goodbye Letter   National Geographic: Stress     Intimacy   PBS Depression Out of the Shadows     Needs/Dealbreakers in Relationships   The Anonymous People    Socialization Skills   [place]  7/11, 7/13   Feelings   John Macedo \"Highjacked Brain\"    ABC Model of Emotion   Srinath Conti Humor in Tx    Grief and Loss   Money/Budget  10/24    Healthy vs. Unhealthy Feelings   Goal Setting  8/10   Meditation/Mindfulness  9/7, 9/12, 9/14 Health Goals    Overconfidence/Complacency   Budget  2/21   Resentment   People/Places/Things  6/13   Stress  7/25, 7/27 CBT  6/21, 6/27, 6/28     "

## 2021-06-14 NOTE — PROGRESS NOTES
D)Patient attended 2 hours of [facility] Outpatient/Service Coordination Group   A)Pt explained he was late because it took him 40 minutes to get his friend Teresa out of his van.  Patient still smelled like urin today. Pt said he felt guilty but asked Teresa to move out of the van. Pt shared that Teresa was a good friend but she brought more trouble and he realized he had to get rid of her.  Pt said he called a sober living and hopefully moving in on 12/1.  Pt said he will stay at his father's for the next two weeks.    T)This writer suggested if he would go to [facility], but patient said he does not want to lose his social security check and will not go.  1:1 scheduled for Tuesday 11/21.

## 2021-06-14 NOTE — PROGRESS NOTES
Weekly Progress Note  Porfirio Clarke  1965  [id number]      D)Pt attended 2/2 groups this week with no absence. Patient had one individual session but he was no show this week. A) Staff facilitated groups and reviewed tx progress. Assessed for [facility]. R) No VAP needed at this time.   Any significant events, defines as events that impact patients relationship with others inside and outside of treatment No   Indicate any changes or monitoring of physical or mental health problems No     Indicate involvement by any outside supports No  IAPP reviewed and modified as needed. No   Pt working on the following dimensions:  Dimension #1 - Withdrawal Potential - Risk 0. UA coming back as clean.  No signs of WA reported or observed.  UA was clean this week.     Specific goals from treatment plan addressed this week: Remain sober     Effectiveness of strategies: effective     Dimension #2 - Biomedical - Risk 1. TBI, chronic neck pain.  Patient does not have primary care provider.  Pt hs Medicare A and B.  Pt is able to access medical care. Patient successfully enrolled in Medicare Part D, Silver Script for 2018.  Patient will need to establish primary care.  Pt was scheduled for primary care visit at [facility]. Date: 12/28 at 4pm with Dr. Garza      Specific goals from treatment plan addressed this week: Establish primary care.      Effectiveness of strategies: Effective.     Dimension #3 - Emotional/Behavioral/Cognitive - Risk 2.  Patient came to both groups this week.  Since stopping being a homeless, patient's personal hygiene improved.  Pt states he moved into a room with his friend.      Specific goals from treatment plan addressed this week:attend group and reschedule 1:1 as well     Effectiveness of strategies: Effective     Dimension #4 - Treatment Acceptance/Resistance - Risk 2. UA supports patient is sboer but patient's attendance have been historically poor. Patient came to both group this week.       Specific goals from treatment plan addressed this week:  Continue to remain contact with probation and not add any new criminal charges. Continue to deal with the pending charge and follow 's order.      Effectiveness of strategies: effective     Dimension #5 - Relapse Potential - Risk 1. According to UA results, patient has been sober since June.  Memory issues seems to be a problem and he forgets date/time frequently.  Pt was referred to [facility] to check his memrory and establish primary care.  Patient was instructed to go to [facility].      Specific goals from treatment plan addressed this week: get a new therapist    Effectiveness of strategies: in progress     Dimension #6 - Recovery Environment - Risk 2. Pt reports he moved into a room with his friend.  Patient has supportive father.  Pt is learning to set boundary with certain friends.      Specific goals from treatment plan addressed this week: Patient to secure place to live     Effectiveness of strategies: in progress     T) Treatment plan updated no.  Patient notified and in agreement Yes.  Patient educated on [employer].   Intake was on 2/13/2017. Patient has completed 106 hours at this time. Projected discharge date has changed due to patient's non-attendance. Current discharge plan is TBD.     CYNTHIA Evans  12/8/2017, 11:25 AM         Psycho-Educational Curriculum  Date Attended  Psycho-Educational Curriculum  Date Attended    Acceptance   Shame/Guilt  3/2    1st Step   Anger/Rage  3/16, 3/21, 3/23    Affirmations   Mental Health     Automatic Negative Thoughts   Anxiety     Cross Addiction  7/18, 7/20 Co-Occurring Disorders     Stages of Change   Inge/Bipolar     Relapse  11/16 Trauma  3/14, 3/16    Addictive Thoughts   Victim Identity     Coping Skills  5/2  Sober Structure     Relapse Prevention   Continuum of Care     Medical Aspects   Non-12 Step Support     Brain/Neurotransmitters  3/30  " Priorities  4/11,4/13   Medication Compliance   Spirituality  11/28/17   VANNA Alcohol/Drug Research   Weekend Planner     Physical Health   Educational Videos     Post Acute Withdrawal   1st Step     Pregnancy and Drug Use   2nd Step     Sexual Health   Assertive Communication     Short-Term/Long-Term Effects  3/30  My name is Broderick CANO    Relationships  4/25, 4/27  Cross Addiction     Assertive Communication   God As We Understood Him     Boundaries   HBO Relapse     Codependence   HBO What Is Addiction     Defense Mechanisms   Medical Aspects 1     Family Roles   Medical Aspects 2     Goodbye Letter   National Geographic: Stress     Intimacy   PBS Depression Out of the Shadows     Needs/Dealbreakers in Relationships   The Anonymous People    Socialization Skills   [place]  7/11, 7/13   Feelings   John Macedo \"Highjacked Brain\"    ABC Model of Emotion   Srinath Conti Humor in Tx    Grief and Loss   Money/Budget  10/24    Healthy vs. Unhealthy Feelings   Goal Setting  8/10   Meditation/Mindfulness  9/7, 9/12, 9/14 Health Goals    Overconfidence/Complacency   Budget  2/21   Resentment   People/Places/Things  6/13   Stress  7/25, 7/27 CBT  6/21, 6/27, 6/28     "

## 2021-06-14 NOTE — PROGRESS NOTES
Weekly Progress Note  Porfirio Clarke  1965  [id number]    Late Entry from Friday 12/15/2017   D)Pt attended 2/2 groups this week with no absence. Patient had one individual session but he was no show this week. A) Staff facilitated groups and reviewed tx progress. Assessed for [facility]. R) No VAP needed at this time.   Any significant events, defines as events that impact patients relationship with others inside and outside of treatment No   Indicate any changes or monitoring of physical or mental health problems No     Indicate involvement by any outside supports No  IAPP reviewed and modified as needed. No   Pt working on the following dimensions:  Dimension #1 - Withdrawal Potential - Risk 0. UA coming back as clean.  No signs of WA reported or observed.  UA was clean this week.     Specific goals from treatment plan addressed this week: Remain sober     Effectiveness of strategies: effective     Dimension #2 - Biomedical - Risk 1. TBI, chronic neck pain.  Patient does not have primary care provider.  Pt hs Medicare A and B.  Pt is able to access medical care. Patient successfully enrolled in Medicare Part D, Silver Script for 2018.  Patient will need to establish primary care.  Pt was scheduled for primary care visit at [facility]. Date: 12/28 at 4pm with Dr. Garza      Specific goals from treatment plan addressed this week: Establish primary care.      Effectiveness of strategies: Effective.     Dimension #3 - Emotional/Behavioral/Cognitive - Risk 2.  Patient came to both groups this week.  Patient said he went to [facility] court for drug charge and was placed on probation.   Pt has [place] case coming up as well.  Patient complains of his roommate but staying away from being homeless.      Specific goals from treatment plan addressed this week:attend group and reschedule 1:1 as well     Effectiveness of strategies: Effective     Dimension #4 - Treatment Acceptance/Resistance - Risk 1. UA  supports patient is sboer and he is showing signs of increased accountability as evidenced by attending groups twice a week and caring for his legal responsibilities.      Specific goals from treatment plan addressed this week:  Continue to remain contact with probation and not add any new criminal charges. Continue to deal with the pending charge and follow 's order.      Effectiveness of strategies: effective     Dimension #5 - Relapse Potential - Risk 1. According to UA results, patient has been sober since June. Patient still at [facility] due to his memory issue and impulsive decision making.      Specific goals from treatment plan addressed this week: get a new therapist    Effectiveness of strategies: in progress     Dimension #6 - Recovery Environment - Risk 2. Pt reports he moved into a room with his friend.  Patient has supportive father.  Pt is learning to set boundary with certain friends.      Specific goals from treatment plan addressed this week: Patient to secure place to live     Effectiveness of strategies: in progress     T) Treatment plan updated no.  Patient notified and in agreement Yes.  Patient educated on Stress Management.   Intake was on 2/13/2017. Patient has completed 112 hours at this time. Projected discharge date has changed due to patient's non-attendance. Current discharge plan is TBD.     CYNTHIA Evans  12/18/2017, 10:52 AM         Psycho-Educational Curriculum  Date Attended  Psycho-Educational Curriculum  Date Attended    Acceptance   Shame/Guilt  3/2    1st Step   Anger/Rage  3/16, 3/21, 3/23    Affirmations   Mental Health     Automatic Negative Thoughts   Anxiety     Cross Addiction  7/18, 7/20 Co-Occurring Disorders     Stages of Change   Inge/Bipolar     Relapse  11/16 Trauma  3/14, 3/16    Addictive Thoughts   Victim Identity     Coping Skills  5/2  Sober Structure     Relapse Prevention   Continuum of Care     Medical Aspects   Non-12 Step Support    "  Brain/Neurotransmitters  3/30  Priorities  4/11,4/13   Medication Compliance   Spirituality  11/28/17   VANNA Alcohol/Drug Research   Weekend Planner     Physical Health   Educational Videos     Post Acute Withdrawal   1st Step     Pregnancy and Drug Use   2nd Step     Sexual Health   Assertive Communication     Short-Term/Long-Term Effects  3/30  My name is Broderick CANO    Relationships  4/25, 4/27  Cross Addiction     Assertive Communication   God As We Understood Him     Boundaries   HBO Relapse     Codependence   HBO What Is Addiction     Defense Mechanisms   Medical Aspects 1     Family Roles   Medical Aspects 2     Goodbye Letter   National Geographic: Stress     Intimacy   PBS Depression Out of the Shadows     Needs/Dealbreakers in Relationships   The Anonymous People    Socialization Skills   [place]  7/11, 7/13   Feelings   John Macedo \"Highjacked Brain\"    ABC Model of Emotion   Srinath Conti Humor in Tx    Grief and Loss   Money/Budget  10/24    Healthy vs. Unhealthy Feelings   Goal Setting  8/10   Meditation/Mindfulness  9/7, 9/12, 9/14 Health Goals    Overconfidence/Complacency   Budget  2/21   Resentment   People/Places/Things  6/13   Stress  7/25, 7/27 CBT  6/21, 6/27, 6/28     "

## 2021-06-14 NOTE — PROGRESS NOTES
D)Patient called after the group star time   A) Pt said he was at [facility] as a witness and also to bring a friend there. Pt said his friend would most likely go to [facility] today.  Pt apologized for not calling in earlier.  Patient's  called.  PO said she has a meeting with patient tomorrow at 2pm and plans to take him to [facility] work on insurance.  This writer reported to PO that patient's group attendance improved and he usually calls in.

## 2021-06-14 NOTE — PROGRESS NOTES
"Weekly Progress Note  Porfirio Clarke  1965  [id number]      D)Pt attended 1/3 groups this week with no absence. Patient had one individual session but he was no show this week. A) Staff facilitated groups and reviewed tx progress. Assessed for [facility]. R) No VAP needed at this time.   Any significant events, defines as events that impact patients relationship with others inside and outside of treatment No   Indicate any changes or monitoring of physical or mental health problems No     Indicate involvement by any outside supports No  IAPP reviewed and modified as needed. Yes  Pt working on the following dimensions:  Dimension #1 - Withdrawal Potential - Risk 0. UA coming back as clean.  No signs of WA reported or observed.      Specific goals from treatment plan addressed this week: Remain sober     Effectiveness of strategies: effective     Dimension #2 - Biomedical - Risk 1. TBI, chronic neck pain.  Patient does not have primary care provider.  Pt hs Medicare A and B.  Pt is able to access medical care. Patient successfully enrolled in Medicare Part D, Silver silkfred for 2018.  Patient will need to establish primary care.      Specific goals from treatment plan addressed this week: Establish primary care.  Sign up for Medicare D     Effectiveness of strategies: Effective. Medicare D enrolled.  Needs priamry care.      Dimension #3 - Emotional/Behavioral/Cognitive - Risk 2. Patient attended one group this week.  Pt said he was unable to attend Thursday group because he got stuck in [employer] job.  Pt said the owner never paid him with money but offered a giant inflatable toy called \"Iceberg\".  Pt states this toy is about $4.000 and has to find a .  Patient's relationship with his father seems to be improving as evidenced by going to [employer] for breakfast together.      Specific goals from treatment plan addressed this week:attend group and reschedule 1:1 as well     Effectiveness of " strategies: Effective     Dimension #4 - Treatment Acceptance/Resistance - Risk 3. UA supports patient is sboer but patient's attendance have been historically poor. Patient seems he is putting effort into treatment as evidenced by making to one group and one individual session this week.      Specific goals from treatment plan addressed this week:  Continue to remain contact with probation and not add any new criminal charges. Continue to deal with the pending charge and follow 's order.      Effectiveness of strategies: effective     Dimension #5 - Relapse Potential - Risk 1. According to UA results, patient has been sober since June.  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  Pt said the partner admitted to using at the job site and patient told him to leave.   Patient seems he is maintaining sobriety despite difficult situation.      Specific goals from treatment plan addressed this week: get a new therapist    Effectiveness of strategies: in progress     Dimension #6 - Recovery Environment - Risk 3. No longer living in his van.  Pt living at his father's and eating more food. Pt reports he may move into his own room by December 1st.      Specific goals from treatment plan addressed this week: Patient to secure place to live     Effectiveness of strategies: in progress     T) Treatment plan updated no.  Patient notified and in agreement Yes.  Patient educated on Spirituality.   Intake was on 2/13/2017. Patient has completed 100 hours at this time. Projected discharge date has changed due to patient's non-attendance. Current discharge plan is TBD.     CYNTHIA Evans  12/1/2017, 12:33 PM         Psycho-Educational Curriculum  Date Attended  Psycho-Educational Curriculum  Date Attended    Acceptance   Shame/Guilt  3/2    1st Step   Anger/Rage  3/16, 3/21, 3/23    Affirmations   Mental Health     Automatic Negative  "Thoughts   Anxiety     Cross Addiction  7/18, 7/20 Co-Occurring Disorders     Stages of Change   Inge/Bipolar     Relapse  11/16 Trauma  3/14, 3/16    Addictive Thoughts   Victim Identity     Coping Skills  5/2  Sober Structure     Relapse Prevention   Continuum of Care     Medical Aspects   Non-12 Step Support     Brain/Neurotransmitters  3/30  Priorities  4/11,4/13   Medication Compliance   Spirituality  11/28/17   VANNA Alcohol/Drug Research   Weekend Planner     Physical Health   Educational Videos     Post Acute Withdrawal   1st Step     Pregnancy and Drug Use   2nd Step     Sexual Health   Assertive Communication     Short-Term/Long-Term Effects  3/30  My name is Broderick CANO    Relationships  4/25, 4/27  Cross Addiction     Assertive Communication   God As We Understood Him     Boundaries   HBO Relapse     Codependence   HBO What Is Addiction     Defense Mechanisms   Medical Aspects 1     Family Roles   Medical Aspects 2     Goodbye Letter   National Geographic: Stress     Intimacy   PBS Depression Out of the Shadows     Needs/Dealbreakers in Relationships   The Anonymous People    Socialization Skills   [place]  7/11, 7/13   Feelings   John Macedo \"Highjacked Brain\"    ABC Model of Emotion   Srinath Conti Humor in Tx    Grief and Loss   Money/Budget  10/24    Healthy vs. Unhealthy Feelings   Goal Setting  8/10   Meditation/Mindfulness  9/7, 9/12, 9/14 Health Goals    Overconfidence/Complacency   Budget  2/21   Resentment   People/Places/Things  6/13   Stress  7/25, 7/27 CBT  6/21, 6/27, 6/28     "

## 2021-06-14 NOTE — PROGRESS NOTES
D)Patient left a message stating he was on his way and will be late for group  A)Pt arrived after the first group break at 2pm.  Patient participated in group check in and said he may move in to a room before 12/1.  Pt said he met an old friend who is looking for a roommate.  Pt said he would be paying $300/month and allow cell phone line to her friend.  Patient's personal hygiene seemed improved since he went back to his father's.  Group went into the second break and patient did not come back till group was over at 3:30pm.   This writer informed him there will no group on Thursday due to Thanksgiving.  Pt looked surprised and said he thought it was still October.

## 2021-06-14 NOTE — PROGRESS NOTES
"D)1:1  A) patient arrived late but he was able to meet with this writer for 40min   R) Pt apologized for being 30 min late and said his father decided to take him out to breakfast at [employer].   Pt updated on his recent job.  Pt said he could not attend group yesterday because he ended up coming home around 9pm.  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 Pt said the home owner showed up really late and did not have money to pay, but instead he offered an inflatable toy \"Iceberg\".  Pt said this inflatable toy is about $4,000 and decided to accept.  Pt states he has to sell it and split the cost between his partner.    We talked about the reason why we scheduled the meeting today.   Pt states  he did not know anything about Medicare Part D and did not enroll in Medicare Part D last year.  Pt said he was prescribed medication in 2017 but could not afford them.   This years's Medicare enrollment is till December 7.  We compared Part D coverage between several insurance agencies.  Pt decided [employer] was the best option for him.     T)his writer assisted patient with enrollment.    "

## 2021-06-14 NOTE — PROGRESS NOTES
D)Patient attended 3 hours of CD Outpatient/Service Coordination group today.   T)Topic: [facility]

## 2021-06-15 NOTE — PROGRESS NOTES
Weekly Progress Note  Porfirio Clarke  1965  [id number]      D)Pt attended  1/2 group this week with no absence. Patient had No individual session A) Staff facilitated groups and reviewed tx progress. Assessed for [facility]. R) No VAP needed at this time.   Any significant events, defines as events that impact patients relationship with others inside and outside of treatment No   Indicate any changes or monitoring of physical or mental health problems No     Indicate involvement by any outside supports No  IAPP reviewed and modified as needed. No   Pt working on the following dimensions:    Dimension #1 - Withdrawal Potential - Risk 0. Pt came to one group this week but he skipped UA.   Patient's last UA is recorded was 12/7.      Specific goals from treatment plan addressed this week: Remain sober     Effectiveness of strategies: effective     Dimension #2 - Biomedical - Risk 1. TBI, chronic neck pain.  Patient does not have primary care provider.  Pt hs Medicare A and B.  Pt is able to access medical care. Patient successfully enrolled in Medicare Part D, [employer] for 2018.  Primary care established at [facility] with Dr. Garza.  Patient's  is helping patient to get to [facility] to see if he can qualify secondary insurance.  This week, patient did not report any changes to his health.  Patient is scheduled with Dr. Garza on 7/2.  Pt will be scheduled for neuropsych testing at [facility].      Specific goals from treatment plan addressed this week: Patient's  and CD counselor are working on toward getting the secondary insurance for patient so he can access more resources such as TBI case management or Mental Health case management.      Effectiveness of strategies:  said the last time she saw patient was on 12/8 and he kept missing the appointments.      Dimension #3 - Emotional/Behavioral/Cognitive - Risk 3.  Patient will benefit  from mental health case management or [facility] services, however he does not have recent DA.  Patient seems to forget date/time a lot.  He came to group late this week and did not do UA.  Patient's  informs patient forgets probation meeting as well.   Patient was frustrated this week stating his girlfriend was actually already released from longterm but she never contacted him.      Specific goals from treatment plan addressed this week: Patient is able to remember the group time/date because they are the routine appointments.  Patient is being encouraged to attend groups on time and get a walk-in DA at [facility]     Effectiveness of strategies: This writer will remind the patient of the importance of the DA.      Dimension #4 - Treatment Acceptance/Resistance - Risk 3. Patient has not done UA since 12/7.   reports he missed some probation meetings.  Pt comes to group late.  This week, patient had a [facility] court on Thursday.      Specific goals from treatment plan addressed this week:  Attend treatment and probation on time.  Get UA done.      Dimension #5 - Relapse Potential - Risk 3. Since patient has not turned in UA since 12/7, it is difficult to determine his sobriety.  Pt reports he was late to Tuesday group because his public storage key got broken.  Pt did not come in Thursday because he said he had afternoon court at [facility] court.       Specific goals from treatment plan addressed this week: Patient needs TBI case management or mental health case management.   After the [facility] appointment, patient was recommended for neuropsych testing.    Neuropsych will be scheduled at [facility].      Effectiveness of strategies: Counselor will check to see if she can assist with scheduling.      Dimension #6 - Recovery Environment - Risk 3. Patient reports he did not like the roommate so he move out.  It is unsure to where he is living  at this time. Pt has a history of living in his van even when he was able to live at his father's. Pt states he had to help out his homeless female friend and lived in his van.  Pt continue to report multiple issues with his van or storage.  Pt lacks stricture in life and has very limited sober friends.    Specific goals from treatment plan addressed this week: Find a permanent safe housing     Effectiveness of strategies: Patient will need to make to 1:1 meeting with CD counselor to search housing options.        T) Treatment plan updated no.  Patient notified and in agreement Yes.  Patient educated on Mindfullness.   Intake was on 2/13/2017. Patient has completed 120 hours at this time. Projected discharge date has changed due to patient's non-attendance. Current discharge plan is TBD.     CYNTHIA Evans  1/12/2018, 11:43 AM         Psycho-Educational Curriculum  Date Attended  Psycho-Educational Curriculum  Date Attended    Acceptance   Shame/Guilt  3/2    1st Step   Anger/Rage  3/16, 3/21, 3/23    Affirmations   Mental Health     Automatic Negative Thoughts   Anxiety     Cross Addiction  7/18, 7/20 Co-Occurring Disorders     Stages of Change   Inge/Bipolar     Relapse  11/16 Trauma  3/14, 3/16    Addictive Thoughts   Victim Identity     Coping Skills  5/2  Sober Structure     Relapse Prevention   Continuum of Care     Medical Aspects   Non-12 Step Support     Brain/Neurotransmitters  3/30  Priorities  4/11,4/13   Medication Compliance   Spirituality  11/28/17   VANNA Alcohol/Drug Research   Weekend Planner     Physical Health   Educational Videos     Post Acute Withdrawal   1st Step     Pregnancy and Drug Use   2nd Step     Sexual Health   Assertive Communication     Short-Term/Long-Term Effects  3/30  My name is Broderick AYALASal    Relationships  4/25, 4/27  Cross Addiction     Assertive Communication   God As We Understood Him     Boundaries   HBO Relapse     Codependence   HBO What Is Addiction     Defense  "Mechanisms   Medical Aspects 1     Family Roles   Medical Aspects 2     Goodbye Letter   [employer] Geographic: Stress     Intimacy   PBS Depression Out of the Shadows     Needs/Dealbreakers in Relationships   The Anonymous People    Socialization Skills   [place]  7/11, 7/13   Feelings   John Macedo \"Highjacked Brain\"    ABC Model of Emotion   Srinath Conti Humor in Tx    Grief and Loss   Money/Budget  10/24    Healthy vs. Unhealthy Feelings   Goal Setting  8/10/17, 1/2/18    Meditation/Mindfulness  9/7, 9/12, 9/14 Health Goals    Overconfidence/Complacency   Budget  2/21   Resentment   People/Places/Things  6/13   Mindfullness  1/9/18     Stress  7/25, 7/27 CBT  6/21, 6/27, 6/28     "
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
## 2021-06-15 NOTE — PROGRESS NOTES
D)Patient called   A) Patient called and said he is having a cellulitis.  Pt said he is still having car issue.  Pt states he went to [facility] twice for cellulitis for both feet.  Pt states his father does not want him around until cellulitis is infection.    Pt states he received a letter from [facility] stating they will violate his probation.  Pt suspected [facility] will violate him as well.  PT states he cannot walk and won't attend group today.

## 2021-06-15 NOTE — PROGRESS NOTES
D) staff informed patient was looking for this writer   A)Pt was sitting at the clinic.  Pt explained he was here already but didn't come into group because he was too late.  This writer told him it is better to be in group than just missing it completely.  Pt was reminded of the 4pm primary doctor appointment.  The message from PO was given to patient.  Pt has PO meeting on Wednesday 1/3 at 11 am.   T)This writer escorted patient to the [facility].
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
## 2021-06-15 NOTE — PROGRESS NOTES
D)met with patient for 40 min after the CD group  A) Pt admits it is very difficult to remember dates and time.    T) This wrier reminded patient of the upcoming neuropsychological testing o 2/7 and printed out the paper, but patient ended up forgetting to take home.  Patient signed release to [facility] where they might be able to help him with housing, insurance and connecting him with case management.  [facility] is located in the same building as [facility] and PO previously arranged an appointment for patient but he missed it.  Pt states he met with his PO last week and learned he missed 8 meetings.  Patient said he may have bone infection and showed his right foot.  It seemed swollen.   Pt said he had bone infection in the past and believed it was the same situation.  This writer reminded him that he does have Medicare and he should see a doctor. We talked to outpatient 's office but she was not in.  This writer emailed her and asked if she can help patient with applying for MA with spend down. Pt reports his SSDI is about $1800 but the government takes $400 each month for the past over payment.   Pt said he was in half-way for one year but they kept giving him SSDI while he was in half-way and now they are subtracting if form his monthly check.  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   Patient usually struggles with dates/time but he is good at remembering regular CD group days.  This writer asked the outpatient  if she can meet with patient on Tuesday or Thursday during group when he shows up.

## 2021-06-15 NOTE — PROGRESS NOTES
D) left a message  A)PO asked to inform patient if he come to group that he hs PO meeting on 1/3 at 11 am

## 2021-06-15 NOTE — PROGRESS NOTES
D) late entry from 1/30/18    A)Patient called after the group was over.    R) Patient said he was actually at the hospital but did not come in because the walk from the [facility] street to outpatient department was too long   T) This writer reminded patient of tomorrow's group from 11:30am -1:30pm.  Pt said he would do his best to attend

## 2021-06-15 NOTE — PROGRESS NOTES
D)  Pt left message statin his key (for the car) didn't work and running late but said he was now on his way
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
## 2021-06-15 NOTE — PROGRESS NOTES
D)call from patient at 11:37 am  A)Pt said he has a [facility] court this afternoon in [place]. Pt said he would do his best to attend group if the court ends quick enough.

## 2021-06-15 NOTE — PROGRESS NOTES
A) met with [facility] financial worker about his outstanding balance.   R) Pt may qualify for special Medical Assistance with spend down based on his outstanding medical bills.  We discussed [facility] Aid as well.  If patient does not qualify for MA, patient can apply for [facility] Program.    T) Probation was updated on this.  PO said she tried to connect Porfirio with [facility], both of whom are in the same building with the probation and they provide  and insurance application.  PO said patient missed both appointments with [facility] and [facility] in the past.  PO informed patient makes gross income of $1800/month but they take $400 of it for the past overpayment.  Pt receives about $1200/month.
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
## 2021-06-15 NOTE — PROGRESS NOTES
"D)call from patient  A)Patient called at 4:30pm and apologized for missing group today.  Pt said \"I'm sick and a lot of things happened.  I will be there next week\"   R)Prior to his phone call, this writer was on the phone with his .  PO said patient has been missing appointments with her and the last time she saw him was 12/8/2017.    "

## 2021-06-15 NOTE — PROGRESS NOTES
D)called patient at 9:30 am to remind him of our 10 am appointment  A)Pt forgot about the appopintment and said he was still at home.  Pt said he would throw his clothes in laundry and hurry to the clinic
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
## 2021-06-15 NOTE — PROGRESS NOTES
D) Patient called   A) Pt said he was at [facility] due to his foot.  Called back patient and left message and asked for more details/update
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
## 2021-06-15 NOTE — PROGRESS NOTES
"D) Patient called at 1:54pm and left a message stating he needs this writer to call back.   A) This writer called him back during group break.    R) Pt said \"something happened and I need to talk to you. I am on my way to group and I want to see you privately after the group.\"    T) This writer told him he was already late for the group and asked to get here as soon as he can.   "
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
## 2021-06-15 NOTE — PROGRESS NOTES
D)Patient was no show to today's 10 am appointment despite two phone calls stating he was on his way  A)This moved the 11 am appointment with someone else to 11:30am and waited for the patient.    T)Patient did not make to today's appointment

## 2021-06-15 NOTE — PROGRESS NOTES
Writer requested to review chart after receiving referral at [place] [facility].    Britney Elam [facility]  1/3/18  1500

## 2021-06-16 NOTE — PROGRESS NOTES
D)Telphone message from patient at 2:50 pm  A) Patient was no call and no show to today's group.   A) Patient left a message at 2:50pm stating he was late as usually but he was still on his way to the clinic to see if he can meet with this writer.

## 2021-06-16 NOTE — PROGRESS NOTES
D) Patient came for 1:1 but was asked to leave   A) Pt said he has 1:15 pm doctor appointment at [facility]   R) Pt said they are worrying about potential MERSA on his foot.  Pt showed his updated picture from earlier today. Pt has open wound on his tow that is as large as the toe itself.  It has yellow and while substance.    T) Patient was asked to leave 1:1 immediately and to go to the [facility]
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
## 2021-06-16 NOTE — PROGRESS NOTES
Weekly Progress Note  Porfirio Clarke  1965  [id number]      D)Pt attended  zero group this week with two absence. Patient had no individual session A) Staff facilitated groups and reviewed tx progress. Assessed for [facility]. R) No VAP needed at this time.   Any significant events, defines as events that impact patients relationship with others inside and outside of treatment No   Indicate any changes or monitoring of physical or mental health problems No     Indicate involvement by any outside supports No  IAPP reviewed and modified as needed. No   Pt working on the following dimensions:    Dimension #1 - Withdrawal Potential - Risk 0. Patient did not attend any groups but he turned in UA on 3/13/18 and it was clean.    No whitewall symptoms observed in patient or reported.    Specific goals from treatment plan addressed this week: Remain sober   Effectiveness of strategies:  Patient appears to be sober there is not enough UA to support.        Dimension #2 - Biomedical - Risk 3. TBI, chronic neck pain.  Patient does not have primary care provider.  Pt hs Medicare A and B.  Pt is able to access medical care. Patient successfully enrolled in Medicare Part D, Silver [employer] for 2018.  Primary care established at [facility] with Dr. Garza.  Patient came to scheduled 1:1 on Thursday 3/15 and showed photograph of his infected toe. Pt said he contacted [facility] and they suspected he has MERSA. Patient was asked to leave and not to attend group until he gets the test result.    Superfics goals from treatment plan addressed this week: Check with [facility] on the open wound that does not heal.    Effectiveness of strategies: Patient's medical issues has been on-going.  Pt is going to [facility] today.      Dimension #3 - Emotional/Behavioral/Cognitive - Risk 3.  Historically very difficult to get patient to come to group due to TBI, medical issues, or other crisis situation.  Patient made 1:1  session today but was asked to leave immediately.  Patient showed photograph of his injected tow that had open wound and yellow and white substance draining.   Specific goals from treatment plan addressed this week: Patient to address his medical issues before focusing on 1:1 or groups  Effectiveness of strategies:  Pt reported earlier today he is going to [facility] this afternoon.      Dimension #4 - Treatment Acceptance/Resistance - Risk 3. Patient lacks hours in CD outpatient.  Pt has multiple probation from different counties.    Specific goals from treatment plan addressed this week: comply with probation and treatment.    Effectiveness of strategies: Patient is sober as evidenced by UA but he lacks group attendance.      Dimension #5 - Relapse Potential - Risk 3.  Patient seems he is maintaining sobriety according to his UA, but his group attendance is extremely poor.  Pt continues to live near crisis situation and unable to see the solution.    Specific goals from treatment plan addressed this week: impulse control, need stable housing   Effectiveness of strategies: not working due to impulse issues     Dimension #6 - Recovery Environment - Risk 3. Patient's living situations changes frequently. Pt is able to live at his father's but preferring to live at his friends' and couch hop.    Specific goals from treatment plan addressed this week: Along with many other things he needs, he needs permanent safe housing    Effectiveness of strategies: Not effective.    T) Treatment plan updated no.  Patient notified and in agreement Yes.  Patient educated on addressing medical issues..   Intake was on 2/13/2017. Patient has completed 126 hours at this time. Projected discharge date has changed due to patient's non-attendance. Current discharge plan is TBD.     CYNTHIA Evans  3/15/2018, 3:50 PM         Psycho-Educational Curriculum  Date Attended  Psycho-Educational Curriculum  Date Attended   "  Acceptance   Shame/Guilt  3/2/17    1st Step   Anger/Rage  3/16/17, 3/21/17, 3/23/17   Affirmations   Mental Health     Automatic Negative Thoughts   Anxiety     Cross Addiction  7/18/17, 7/20/17 Co-Occurring Disorders     Stages of Change   Inge/Bipolar     Relapse  11/16/17 Trauma  3/14/17, 3/16/17    Addictive Thoughts   Victim Identity     Coping Skills  5/2/17 Sober Structure     Relapse Prevention   Continuum of Care     Medical Aspects   Non-12 Step Support     Brain/Neurotransmitters  3/30/17 Priorities  4/11/17,4/13/17   Medication Compliance   Spirituality  11/28/17, 3/1/18    VANNA Alcohol/Drug Research   Weekend Planner     Physical Health   Educational Videos     Post Acute Withdrawal   1st Step     Pregnancy and Drug Use   2nd Step     Sexual Health   Assertive Communication     Short-Term/Long-Term Effects  3/30/17 My name is Broderick CANO    Relationships  4/25/17, 4/27/17 Cross Addiction     Assertive Communication   God As We Understood Him     Boundaries   HBO Relapse     Codependence   HBO What Is Addiction     Defense Mechanisms  3/6/18  Medical Aspects 1     Family Roles   Medical Aspects 2     Goodbye Letter   [employer]: Stress     Intimacy   PBS Depression Out of the Shadows     Needs/Dealbreakers in Relationships   The Anonymous People    Socialization Skills   [place]  7/11/17, 7/13/17   Feelings   John Macedo \"Highjacked Brain\"    ABC Model of Emotion   Srinath Conti Humor in [place]    Grief and Loss   Money/Budget  10/24/17   Healthy vs. Unhealthy Feelings   Goal Setting  8/10/17, 1/2/18    Meditation/Mindfulness  9/7/17, 9/12/17, 9/14/17 Health Goals    Overconfidence/Complacency   Budget  2/21/16, 1/23/17   Resentment   People/Places/Things  6/13/17   Mindfullness  1/9/18     Stress  7/25/17, 7/27/17 CBT  6/21/17, 6/27/17, 6/28/17     "
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
## 2021-06-16 NOTE — PROGRESS NOTES
"Weekly Progress Note  Porfirio Clarke  1965  [id number]      D)Pt attended  1/3 group this week with two absence. Patient had no  individual session A) Staff facilitated groups and reviewed tx progress. Assessed for [facility]. R) No VAP needed at this time.   Any significant events, defines as events that impact patients relationship with others inside and outside of treatment No   Indicate any changes or monitoring of physical or mental health problems No     Indicate involvement by any outside supports No  IAPP reviewed and modified as needed. No   Pt working on the following dimensions:    Dimension #1 - Withdrawal Potential - Risk 0.  Patient's last UA is from 1/23/18. Patient did not turn in UA this week.  No whitewall symptoms observed in patient or reported.    Specific goals from treatment plan addressed this week: Remain sober   Effectiveness of strategies:  Patient appears to be sober there is not enough UA to support.        Dimension #2 - Biomedical - Risk 1. TBI, chronic neck pain.  Patient does not have primary care provider.  Pt hs Medicare A and B.  Pt is able to access medical care. Patient successfully enrolled in Medicare Part D, Silver Script for 2018.  [facility] care established at [facility] with Dr. Garza.  Patient reports he had an injected foot and could not attend group for a month.  Patient came to Thursday group and said he was able to walk and taking medications.    Specific goals from treatment plan addressed this week: Patient to attend all CD groups to remain sober so it helps with healing.    Effectiveness of strategies: Patient's medical issues has been on-going.      Dimension #3 - Emotional/Behavioral/Cognitive - Risk 3.  Historically very difficult to get patient to come to group due to TBI, medical issues, or other crisis situation.  Patient has tendency to complain his misfortune that continues to happen but he also states \"I create them\".  Pt seems constantly on " financial problem because he is constantly fixing cars.  Pt said his car was towed this week and has to pay couple hundred dollars to get it back.    Specific goals from treatment plan addressed this week: Patient was gone for over a month. Patient agreed to resume group.    Effectiveness of strategies: Patient to attend CD groups on time every time     Dimension #4 - Treatment Acceptance/Resistance - Risk 3. Patient lacks hours in CD outpatient.  Pt has tendency to be impulsive and reactive.  Patient is probation mandated to be in treatment but his attendance has struggled for the past 1 month.  He was no calls and no shows for weeks and resurfaced yesterday.    Specific goals from treatment plan addressed this week: comply with probation and treatment.    Effectiveness of strategies:  Pending. Patient to resume groups and turn in UAs     Dimension #5 - Relapse Potential - Risk 3.  Patient's sobriety cannot be verified since he has not turned in UA since 1/23.  Patient continues to be at high risk for relapse despite being in outpatient program since 2/13/2017.  patient continues to be impulsive and changes his address frequently, couch hopping.  Patient has supportive father but frequently makes joke about him and how unhealthy the relationship is. Pt seems to lack to look at his part in the relationship.    Specific goals from treatment plan addressed this week: impulse control, need stable housing   Effectiveness of strategies: not working due to impulse issues     Dimension #6 - Recovery Environment - Risk 3. Patient reports this week that he is living between his father's and friend Beatriz. Patient often changes his address and hard to find.  No support groups.  Has supportive father but patient describe this relationship to be toxic due to father's mental health issues.    Specific goals from treatment plan addressed this week: Along with many other things he needs, he needs permanent safe housing   "  Effectiveness of strategies: Patient will need to make to 1:1 meeting with CD counselor to search housing options.        T) Treatment plan updated no.  Patient notified and in agreement Yes.  Patient educated on 8 Dimensions of Wellness.   Intake was on 2/13/2017. Patient has completed 124 hours at this time. Projected discharge date has changed due to patient's non-attendance. Current discharge plan is TBD.     [facility]  3/2/2018, 2:58 PM         Psycho-Educational Curriculum  Date Attended  Psycho-Educational Curriculum  Date Attended    Acceptance   Shame/Guilt  3/2/17    1st Step   Anger/Rage  3/16/17, 3/21/17, 3/23/17   Affirmations   Mental Health     Automatic Negative Thoughts   Anxiety     Cross Addiction  7/18/17, 7/20/17 Co-Occurring Disorders     Stages of Change   Inge/Bipolar     Relapse  11/16/17 Trauma  3/14/17, 3/16/17    Addictive Thoughts   Victim Identity     Coping Skills  5/2/17 Sober Structure     Relapse Prevention   Continuum of Care     Medical Aspects   Non-12 Step Support     Brain/Neurotransmitters  3/30/17 Priorities  4/11/17,4/13/17   Medication Compliance   Spirituality  11/28/17, 3/1/18    VANNA Alcohol/Drug Research   Weekend Planner     Physical Health   Educational Videos     Post Acute Withdrawal   1st Step     Pregnancy and Drug Use   2nd Step     Sexual Health   Assertive Communication     Short-Term/Long-Term Effects  3/30/17 My name is Broderick CANO    Relationships  4/25/17, 4/27/17 Cross Addiction     Assertive Communication   God As We Understood Him     Boundaries   HBO Relapse     Codependence   HBO What Is Addiction     Defense Mechanisms   Medical Aspects 1     Family Roles   Medical Aspects 2     Goodbye Letter   [employer] Geographic: Stress     Intimacy   PBS Depression Out of the Shadows     Needs/Dealbreakers in Relationships   The Anonymous People    Socialization Skills   [place]  7/11/17, 7/13/17   Feelings   John Macedo \"Highjacked Brain\"    ABC " Model of Emotion   Srinath Conti Humor in Tx    Grief and Loss   Money/Budget  10/24/17   Healthy vs. Unhealthy Feelings   Goal Setting  8/10/17, 1/2/18    Meditation/Mindfulness  9/7/17, 9/12/17, 9/14/17 Health Goals    Overconfidence/Complacency   Budget  2/21/16, 1/23/17   Resentment   People/Places/Things  6/13/17   Mindfullness  1/9/18     Stress  7/25/17, 7/27/17 CBT  6/21/17, 6/27/17, 6/28/17

## 2021-06-16 NOTE — PROGRESS NOTES
D)patient attended 2 hours of CD outpatient Service Coordination group   A) Pt said he was late because his friend was fixing his car for free.  Pt said he ended up paying $600 to get his car out of impound.

## 2021-06-16 NOTE — PROGRESS NOTES
D)Patient called   A) Pt apologized for not attending group for the past one month.  Pt said he was recovering from his foot infection.    R) Pt reports his car was towed due to snow emergency and his father could not give him a ride today to attend group.   T) Pt said he will start group attendance from Thursday.

## 2021-06-16 NOTE — PROGRESS NOTES
Weekly Progress Note  Porfirio Clarke  1965  [id number]      D)Pt attended  1/3 group this week with two absence. Patient had no  individual session A) Staff facilitated groups and reviewed tx progress. Assessed for [facility]. R) No VAP needed at this time.   Any significant events, defines as events that impact patients relationship with others inside and outside of treatment No   Indicate any changes or monitoring of physical or mental health problems No     Indicate involvement by any outside supports No  IAPP reviewed and modified as needed. No   Pt working on the following dimensions:    Dimension #1 - Withdrawal Potential - Risk 0.  UA on 3/6/18 was clean.    No whitewall symptoms observed in patient or reported.    Specific goals from treatment plan addressed this week: Remain sober   Effectiveness of strategies:  Patient appears to be sober there is not enough UA to support.        Dimension #2 - Biomedical - Risk 1. TBI, chronic neck pain.  Patient does not have primary care provider.  Pt hs Medicare A and B.  Pt is able to access medical care. Patient successfully enrolled in Medicare Part D, Silver Script for 2018.  [facility] care established at [facility] with Dr. Garza.  Patient reports he had an injected foot and could not attend group for a month.  Patient came to Thursday group and said he was able to walk and taking medications.    Specific goals from treatment plan addressed this week: Patient to attend all CD groups to remain sober so it helps with healing.    Effectiveness of strategies: Patient's medical issues has been on-going.      Dimension #3 - Emotional/Behavioral/Cognitive - Risk 3.  Historically very difficult to get patient to come to group due to TBI, medical issues, or other crisis situation.  Patient made to one group on Tuesday and he came in late.  Pt was no call and no show on Thursday.   Specific goals from treatment plan addressed this week: Patient was gone for over a  month. Patient agreed to resume group but he skipped Thursday group.    Effectiveness of strategies: Not effective.      Dimension #4 - Treatment Acceptance/Resistance - Risk 3. Patient lacks hours in CD outpatient.  Pt has multiple probation from different counties.    Specific goals from treatment plan addressed this week: comply with probation and treatment.    Effectiveness of strategies: Patient is sober as evidenced by UA but he lacks group attendance.      Dimension #5 - Relapse Potential - Risk 3.  Patient seems he is maintaining sobriety according to his UA, but his group attendance is extremely poor.  Pt continues to live near crisis situation and unable to see the solution.    Specific goals from treatment plan addressed this week: impulse control, need stable housing   Effectiveness of strategies: not working due to impulse issues     Dimension #6 - Recovery Environment - Risk 3. Patient reports this week that he is living between his father's and friend Beatriz. Patient often changes his address and hard to find.  No support groups.  Has supportive father but patient describe this relationship to be toxic due to father's mental health issues.  Patient does not want to live in a sober house with other residents.  Pt states he had to pay several hundred dollars to get his car out of impound. Pt was late on Tueaday group because his friend was working on his car.  Pt as no call and no show to Thursday group.    Specific goals from treatment plan addressed this week: Along with many other things he needs, he needs permanent safe housing    Effectiveness of strategies: Not effective.    T) Treatment plan updated no.  Patient notified and in agreement Yes.  Patient educated on 8 Dimensions of Defense Mechanism.   Intake was on 2/13/2017. Patient has completed 126 hours at this time. Projected discharge date has changed due to patient's non-attendance. Current discharge plan is TBD.     Hieke Nagel,  "[facility]  3/11/2018, 10:12 AM         Psycho-Educational Curriculum  Date Attended  Psycho-Educational Curriculum  Date Attended    Acceptance   Shame/Guilt  3/2/17    1st Step   Anger/Rage  3/16/17, 3/21/17, 3/23/17   Affirmations   Mental Health     Automatic Negative Thoughts   Anxiety     Cross Addiction  7/18/17, 7/20/17 Co-Occurring Disorders     Stages of Change   Inge/Bipolar     Relapse  11/16/17 Trauma  3/14/17, 3/16/17    Addictive Thoughts   Victim Identity     Coping Skills  5/2/17 Sober Structure     Relapse Prevention   Continuum of Care     Medical Aspects   Non-12 Step Support     Brain/Neurotransmitters  3/30/17 Priorities  4/11/17,4/13/17   Medication Compliance   Spirituality  11/28/17, 3/1/18    VANNA Alcohol/Drug Research   Weekend Planner     Physical Health   Educational Videos     Post Acute Withdrawal   1st Step     Pregnancy and Drug Use   2nd Step     Sexual Health   Assertive Communication     Short-Term/Long-Term Effects  3/30/17 My name is Broderick CANO    Relationships  4/25/17, 4/27/17 Cross Addiction     Assertive Communication   God As We Understood Him     Boundaries   HBO Relapse     Codependence   HBO What Is Addiction     Defense Mechanisms  3/6/18  Medical Aspects 1     Family Roles   Medical Aspects 2     Goodbye Letter   National Geographic: Stress     Intimacy   PBS Depression Out of the Shadows     Needs/Dealbreakers in Relationships   The Anonymous People    Socialization Skills   [place]  7/11/17, 7/13/17   Feelings   John Macedo \"Highjacked Brain\"    ABC Model of Emotion   Srinath Conti Humor in Tx    Grief and Loss   Money/Budget  10/24/17   Healthy vs. Unhealthy Feelings   Goal Setting  8/10/17, 1/2/18    Meditation/Mindfulness  9/7/17, 9/12/17, 9/14/17 Health Goals    Overconfidence/Complacency   Budget  2/21/16, 1/23/17   Resentment   People/Places/Things  6/13/17   Mindfullness  1/9/18     Stress  7/25/17, 7/27/17 CBT  6/21/17, 6/27/17, 6/28/17     "

## 2021-06-16 NOTE — PROGRESS NOTES
D)Late entry from 3/8/18  A) Patient was no call and no show to CD outpatient/ Service Coordination group on Thursday 3/8/18

## 2021-06-17 NOTE — PROGRESS NOTES
"D) This writer called patient to remind him of today's 1:1 at 11 am.    A) There was an automated voice saying \"At subscriber's request, this phone does not accept incoming calls.    R) Unable to leave message to remind appointment or communicate with patient    "

## 2021-06-17 NOTE — PROGRESS NOTES
Patient called before the group and said he had to deal with his storage.  Pt said he will be late for group.  Pt never showed up to group.

## 2021-06-17 NOTE — PROGRESS NOTES
D)Pateint called and left a message  A) Pt said his father had to be brought to a hospital and he is with him right now.   T) Pt said he won't be able to make to group today.  Patient at [phone].

## 2021-06-17 NOTE — PROGRESS NOTES
Weekly Progress Note  Porfirio Clarke  1965  [id number]      D)Pt attended no group this week with two absence. Patient had one individual session A) Staff facilitated groups and reviewed tx progress. Assessed for [facility]. R) No VAP needed at this time.   Any significant events, defines as events that impact patients relationship with others inside and outside of treatment No   Indicate any changes or monitoring of physical or mental health problems No     Indicate involvement by any outside supports No  IAPP reviewed and modified as needed. No   Pt working on the following dimensions:    Dimension #1 - Withdrawal Potential - Risk 0. UA on 5/1 was positive for Opiate.  No whitewall symptoms observed reported by patient.  Need explanation on why UA was positive.    Specific goals from treatment plan addressed this week:  Positive UA   Effectiveness of strategies:  Positive UA for opiate.      Dimension #2 - Biomedical - Risk 2. TBI, chronic neck pain.  Pt hs Medicare A and B.  Pt is able to access medical service. PCP at [facility].  Patient historically poor with making to appointments.    Superfics goals from treatment:  Patient's UA came positive on Tuesday and he missed group on Thursday.    Effectiveness of strategies: Need explanation on positive UA. Patient to update his medication list     Dimension #3 - Emotional/Behavioral/Cognitive - Risk 3.  Poor attendance.  Patient missed both group this week.  Patient left a message on Tuesday stating he was at the hospital already but didn't enter group room.  This writer met with xuan for 1:1.  Patient was informed he has to start attending group and he agreed.  Patient was no show for Thursday group.     Specific goals from treatment plan addressed this week:  Patient has no adherence to treatment    Effectiveness of strategies: Not effective.      Dimension #4 - Treatment Acceptance/Resistance - Risk 3. On probation with [facility] and [place].    UA positive on 5/1 for opiate. Need explanation from patient but he missed group again on Thursday 5/3.    Specific goals from treatment plan addressed this week: comply with probation and treatment.    Effectiveness of strategies: Probation has not violated patient despite his lack of CD attendance. PO seems to be highly understanding of TBI issue.      Dimension #5 - Relapse Potential - Risk 3.  Positive UA on 5/1 but no explanation yet. Pt missed both groups despite his agreement  Specific goals from treatment plan addressed this week:  Unable to work on goals due to lack of attendance and positive UA   Effectiveness of strategies:  Poor.      Dimension #6 - Recovery Environment - Risk 3. Patient reports he lives at his niece's house with his brother. This is a temporary housing situation.   Pt continues to refuse to go to any sober house because.    Specific goals from treatment plan addressed this week: 1:1 to discuss current life situation   Effectiveness of strategies: Poor. Patient missed 1:1 this week and only came for one group.        T) Treatment plan updated no.  Patient notified and in agreement Yes.  Patient educated on crisis management.    Intake was on 2/13/2017. Patient has completed 132 hours at this time. Projected discharge date has changed due to patient's non-attendance. Current discharge plan is TBD.     CYNTHIA Evans  5/11/2018, 1:49 PM         Psycho-Educational Curriculum  Date Attended  Psycho-Educational Curriculum  Date Attended    Acceptance   Shame/Guilt  3/2/17    1st Step   Anger/Rage  3/16/17, 3/21/17, 3/23/17   Affirmations   Mental Health  4/17/18   Automatic Negative Thoughts   Anxiety     Cross Addiction  7/18/17, 7/20/17 Co-Occurring Disorders     Stages of Change  4/12/18  Inge/Bipolar     Relapse  11/16/17 Trauma  3/14/17, 3/16/17    Addictive Thoughts   Victim Identity     Coping Skills  5/2/17 Sober Structure     Relapse Prevention   Continuum of Care    "  Medical Aspects   Non-12 Step Support     Brain/Neurotransmitters  3/30/17 Priorities  4/11/17,4/13/17   Medication Compliance   Spirituality  11/28/17, 3/1/18    VANNA Alcohol/Drug Research   Weekend Planner     Physical Health   Educational Videos     Post Acute Withdrawal   1st Step     Pregnancy and Drug Use   2nd Step     Sexual Health   Assertive Communication     Short-Term/Long-Term Effects  3/30/17 My name is Broderick CANO    Relationships  4/25/17, 4/27/17 Cross Addiction     Assertive Communication   God As We Understood Him     Boundaries   HBO Relapse     Codependence   HBO What Is Addiction     Defense Mechanisms  3/6/18  Medical Aspects 1     Family Roles   Medical Aspects 2     Goodbye Letter   National Geographic: Stress     Intimacy   PBS Depression Out of the Shadows     short term/long term effects  The Anonymous People    Socialization Skills  5/1/18 [place]  7/11/17, 7/13/17   Feelings   John Macedo \"Highjacked Brain\"    ABC Model of Emotion   Srinath Conti Humor in Tx    Grief and Loss   Money/Budget  10/24/17   Healthy vs. Unhealthy Feelings   Goal Setting  8/10/17, 1/2/18    Meditation/Mindfulness  9/7/17, 9/12/17, 9/14/17 Health Goals    Overconfidence/Complacency   Budget  2/21/16, 1/23/17   Resentment   People/Places/Things  6/13/17   Mindfullness  1/9/18     Stress  7/25/17, 7/27/17 CBT  6/21/17, 6/27/17, 6/28/17     "

## 2021-06-17 NOTE — PROGRESS NOTES
D) patient left a phone message stating he would be late for group today.   A) Patient showed up and attended 2 hours of CD Outpatient Service Coordination group.  Patient appeared clean and healthy. Pt said he moved in with his brother.    R) Pt was informed that he wad not attended group for almost 30 days and he was about to get discharged soon.   T) 1:1 scheduled for next week.

## 2021-06-17 NOTE — PROGRESS NOTES
Weekly Progress Note  Porfirio Clarke  1965  [id number]      D)Pt attended one group this week with one absence. Patient had no individual session A) Staff facilitated groups and reviewed tx progress. Ass[facility] for [facility]. R) No VAP needed at this time.   Any significant events, defines as events that impact patients relationship with others inside and outside of treatment No   Indicate any changes or monitoring of physical or mental health problems No     Indicate involvement by any outside supports No  IAPP reviewed and modified as needed. No   Pt working on the following dimensions:    Dimension #1 - Withdrawal Potential - Risk 0. UA on 4/12/18 and it was clean.    No whitewall symptoms observed in patient or reported.    Specific goals from treatment plan addressed this week: Remain sober   Effectiveness of strategies:  Patient appears to be sober.      Dimension #2 - Biomedical - Risk 2. TBI, chronic neck pain.  Patient does not have primary care provider.  Pt hs Medicare A and B.  Pt is able to access medical care. Patient did not have any contact for  3 plus weeks and suddenly showed up to group on Thursday. Patient's personal hygiene seems managed and he looked healthier.  Patient was gone form CD treatment for almost 1 month and was about to get discharged at the end of the week.  1:1 scheduled.    Superfics goals from treatment:  Patient will need with CD counselor to talk about his attendance and future plan.    Effectiveness of strategies: Pending. Pt has multiple no shows and gone from CD treatment for almost a month. Pt will need to meet with the counselor.      Dimension #3 - Emotional/Behavioral/Cognitive - Risk 3.  Historically very difficult to get patient to come to group due to TBI, medical issues, or other crisis situation.  Pt reports he is sober since June 2017.  UA was clean this week and he seemed to be healthy.    Specific goals from treatment plan addressed this week:  Patient came back to  group after almost 4 weeks of abstinence. Pt was about to get discharged by the end of the week for non-contact.    Effectiveness of strategies:  Pt to meet with CD counselor.       Dimension #4 - Treatment Acceptance/Resistance - Risk 3. Patient lacks hours in CD outpatient.  Pt has multiple probation from different counties.   is extremely understanding of patient's TBI issue.    Specific goals from treatment plan addressed this week: comply with probation and treatment.    Effectiveness of strategies: Patient is sober as evidenced by UA but he lacks group attendance.      Dimension #5 - Relapse Potential - Risk 2.  Patient seems he is maintaining sobriety according to his UA but seems to lack living skills, and impulse control.    Specific goals from treatment plan addressed this week: Pt to meet with CD counselor after many weeks of abstinence.    Effectiveness of strategies: Pending.  Patient historically struggled to attend groups or 1:1    Dimension #6 - Recovery Environment - Risk 2. Patient's living situations changes frequently. This week patient reported he moved in with his brother.    Specific goals from treatment plan addressed this week: 1:1 to discuss current life situation   Effectiveness of strategies: Pending.  1:1 is scheduled.       T) Treatment plan updated no.  Patient notified and in agreement Yes.  Patient educated on Stages of Change.   Intake was on 2/13/2017. Patient has completed 128 hours at this time. Projected discharge date has changed due to patient's non-attendance. Current discharge plan is TBD.     CYNTHIA Evans  4/15/2018, 9:37 AM         Psycho-Educational Curriculum  Date Attended  Psycho-Educational Curriculum  Date Attended    Acceptance   Shame/Guilt  3/2/17    1st Step   Anger/Rage  3/16/17, 3/21/17, 3/23/17   Affirmations   Mental Health     Automatic Negative Thoughts   Anxiety     Cross Addiction  7/18/17, 7/20/17 Co-Occurring Disorders    "  Stages of Change  4/12/18  Inge/Bipolar     Relapse  11/16/17 Trauma  3/14/17, 3/16/17    Addictive Thoughts   Victim Identity     Coping Skills  5/2/17 Sober Structure     Relapse Prevention   Continuum of Care     Medical Aspects   Non-12 Step Support     Brain/Neurotransmitters  3/30/17 Priorities  4/11/17,4/13/17   Medication Compliance   Spirituality  11/28/17, 3/1/18    VANNA Alcohol/Drug Research   Weekend Planner     Physical Health   Educational Videos     Post Acute Withdrawal   1st Step     Pregnancy and Drug Use   2nd Step     Sexual Health   Assertive Communication     Short-Term/Long-Term Effects  3/30/17 My name is Broderick CANO    Relationships  4/25/17, 4/27/17 Cross Addiction     Assertive Communication   God As We Understood Him     Boundaries   HBO Relapse     Codependence   HBO What Is Addiction     Defense Mechanisms  3/6/18  Medical Aspects 1     Family Roles   Medical Aspects 2     Goodbye Letter   [employer] Geographic: Stress     Intimacy   PBS Depression Out of the Shadows     Needs/Dealbreakers in Relationships   The Anonymous People    Socialization Skills   [place]  7/11/17, 7/13/17   Feelings   John Macedo \"Highjacked Brain\"    ABC Model of Emotion   Srinath Conti Humor in Tx    Grief and Loss   Money/Budget  10/24/17   Healthy vs. Unhealthy Feelings   Goal Setting  8/10/17, 1/2/18    Meditation/Mindfulness  9/7/17, 9/12/17, 9/14/17 Health Goals    Overconfidence/Complacency   Budget  2/21/16, 1/23/17   Resentment   People/Places/Things  6/13/17   Mindfullness  1/9/18     Stress  7/25/17, 7/27/17 CBT  6/21/17, 6/27/17, 6/28/17     "

## 2021-06-17 NOTE — PROGRESS NOTES
Weekly Progress Note  Porfirio Clarke  1965  [id number]      D)Pt attended 1/3 group this week with two absence. Patient had no individual session A) Staff facilitated groups and reviewed tx progress. Assessed for [facility]. R) No VAP needed at this time.   Any significant events, defines as events that impact patients relationship with others inside and outside of treatment No   Indicate any changes or monitoring of physical or mental health problems No     Indicate involvement by any outside supports No  IAPP reviewed and modified as needed. No   Pt working on the following dimensions:    Dimension #1 - Withdrawal Potential - Risk 0. UA on 4/12/18 and it was clean.    No whitewall symptoms observed in patient or reported.    Specific goals from treatment plan addressed this week: Remain sober   Effectiveness of strategies:  Patient appears to be sober.      Dimension #2 - Biomedical - Risk 2. TBI, chronic neck pain.  Patient does not have primary care provider.  Pt hs Medicare A and B.  Pt is able to access medical care. Patient did not have any contact for  3 plus weeks and suddenly showed up to group on Thursday. Patient's personal hygiene seems managed and he looked healthier.  Patient was gone form CD treatment for almost 1 month and was about to get discharged at the end of the week.  1:1 scheduled.    Superfics goals from treatment:  Patient will need with CD counselor to talk about his attendance and future plan.    Effectiveness of strategies: Pending. Pt has multiple no shows and gone from CD treatment for almost a month. Pt will need to meet with the counselor.      Dimension #3 - Emotional/Behavioral/Cognitive - Risk 3.  Patient came to group on Tuesday for 3 hours.  Missed 1:1, missed Wednesday group.  Called before Thursday group to let counselor know that patient had to take his father to a hospital and won't be in group. Patient reports he struggle with remembering and asked what month it was during  tuesday group.   Historically  Patient has been struggling with showing up to 1:1 and groups.    Specific goals from treatment plan addressed this week:  Patient missed 1:1 again.  1:1 to be rescheduled.    Effectiveness of strategies:  Poor.  Pt to meet with CD counselor.       Dimension #4 - Treatment Acceptance/Resistance - Risk 3. Patient lacks hours in CD outpatient.  Pt has multiple probation from different counties.   is extremely understanding of patient's TBI issue.    Specific goals from treatment plan addressed this week: comply with probation and treatment.    Effectiveness of strategies: Patient is sober as evidenced by UA but he lacks group attendance.      Dimension #5 - Relapse Potential - Risk 2.  Patient seems he is maintaining sobriety according to his UA but seems to lack living skills, and impulse control.    Specific goals from treatment plan addressed this week: Pt to meet with CD counselor after many weeks of abstinence and multiple no shows.    Effectiveness of strategies: Poor.  Patient historically struggled to attend groups or 1:1 but he is good at calling to inform/update how he is doing     Dimension #6 - Recovery Environment - Risk 3. Patient reports he lives at his niece's house with his brother. This is a temporary housing situation.   Pt continues to refuse to go to any sober house because.    Specific goals from treatment plan addressed this week: 1:1 to discuss current life situation   Effectiveness of strategies: Poor. Patient missed 1:1 this week and only came for one group.        T) Treatment plan updated no.  Patient notified and in agreement Yes.  Patient educated on Anger/Mental Heatlh.   Intake was on 2/13/2017. Patient has completed 130 hours at this time. Projected discharge date has changed due to patient's non-attendance. Current discharge plan is TBD.     CYNTHIA Evans  4/20/2018, 11:29 AM         Psycho-Educational Curriculum  Date Attended  " Psycho-Educational Curriculum  Date Attended    Acceptance   Shame/Guilt  3/2/17    1st Step   Anger/Rage  3/16/17, 3/21/17, 3/23/17   Affirmations   Mental Health  4/17/18   Automatic Negative Thoughts   Anxiety     Cross Addiction  7/18/17, 7/20/17 Co-Occurring Disorders     Stages of Change  4/12/18  Inge/Bipolar     Relapse  11/16/17 Trauma  3/14/17, 3/16/17    Addictive Thoughts   Victim Identity     Coping Skills  5/2/17 Sober Structure     Relapse Prevention   Continuum of Care     Medical Aspects   Non-12 Step Support     Brain/Neurotransmitters  3/30/17 Priorities  4/11/17,4/13/17   Medication Compliance   Spirituality  11/28/17, 3/1/18    VANNA Alcohol/Drug Research   Weekend Planner     Physical Health   Educational Videos     Post Acute Withdrawal   1st Step     Pregnancy and Drug Use   2nd Step     Sexual Health   Assertive Communication     Short-Term/Long-Term Effects  3/30/17 My name is Broderick CANO    Relationships  4/25/17, 4/27/17 Cross Addiction     Assertive Communication   God As We Understood Him     Boundaries   HBO Relapse     Codependence   HBO What Is Addiction     Defense Mechanisms  3/6/18  Medical Aspects 1     Family Roles   Medical Aspects 2     Goodbye Letter   [employer]ographic: Stress     Intimacy   PBS Depression Out of the Shadows     Needs/Dealbreakers in Relationships   The Anonymous People    Socialization Skills   [place]  7/11/17, 7/13/17   Feelings   John Macedo \"Highjacked Brain\"    ABC Model of Emotion   Srinath Conti Humor in Tx    Grief and Loss   Money/Budget  10/24/17   Healthy vs. Unhealthy Feelings   Goal Setting  8/10/17, 1/2/18    Meditation/Mindfulness  9/7/17, 9/12/17, 9/14/17 Health Goals    Overconfidence/Complacency   Budget  2/21/16, 1/23/17   Resentment   People/Places/Things  6/13/17   Mindfullness  1/9/18     Stress  7/25/17, 7/27/17 CBT  6/21/17, 6/27/17, 6/28/17     "

## 2021-06-17 NOTE — PROGRESS NOTES
D)Pateint attended 2 hours of CD Outpatient/Service Coordination group today.   A) Pt states he is living at his brother's daughter's house and looking for his own place to live.  Pt was offered that CD counselor can help with [facility] housing, but patient refused stating he does not want to give up his social security check.  Patient explained he missed 1:1 earlier today because of miscommunication with transportation with his brother. Pt said his brother thought he was picking up patient at 11:30 am.

## 2021-06-17 NOTE — PROGRESS NOTES
Weekly Progress Note  Porfirio Clarke  1965  [id number]      D)Pt attended 1/2 group this week with one absence. Patient had no individual session A) Staff facilitated groups and reviewed tx progress. Assessed for [facility]. R) No VAP needed at this time.   Any significant events, defines as events that impact patients relationship with others inside and outside of treatment No   Indicate any changes or monitoring of physical or mental health problems No     Indicate involvement by any outside supports No  IAPP reviewed and modified as needed. No   Pt working on the following dimensions:    Dimension #1 - Withdrawal Potential - Risk 0. UA on 5/1 was positive for Opiate.  No whitewall symptoms observed reported by patient.  Need explanation on why UA was positive.    Specific goals from treatment plan addressed this week:  Positive UA   Effectiveness of strategies:  Positive UA for opiate.      Dimension #2 - Biomedical - Risk 2. TBI, chronic neck pain.  Pt hs Medicare A and B.  Pt is able to access medical service. PCP at [facility].  Patient historically poor with making to appointments.    Superfics goals from treatment:  Patient's UA came positive on Tuesday and he missed group on Thursday.    Effectiveness of strategies: Need explanation on positive UA. Patient to update his medication list     Dimension #3 - Emotional/Behavioral/Cognitive - Risk 3.  Poor attendance.  Pt came to 1 hour of group this week and skipped Thursday group.  Pt called in and said he had to deal with his storage issue.  Pt historically has on-going issue with vehicles, storage, housing, friends, health issues, courts.     Specific goals from treatment plan addressed this week:  None.  Patient only made to tail end of the Tuesday group.    Effectiveness of strategies:  Poor.      Dimension #4 - Treatment Acceptance/Resistance - Risk 3. On probation with [facility] and [place].   UA positive on 5/1 for opiate. Need explanation  from patient but he missed group again on Thursday 5/3.    Specific goals from treatment plan addressed this week: comply with probation and treatment.    Effectiveness of strategies: Probation has not violated patient despite his lack of CD attendance. PO seems to be highly understanding of TBI issue.      Dimension #5 - Relapse Potential - Risk 2.  Positive UA on 5/1 but no explanation yet. Pt missed yet another group on 5/3 despite him calling in early to let counselor know he would be late.    Specific goals from treatment plan addressed this week:  Unable to work on goals due to lack of attendance and positive UA   Effectiveness of strategies: Poor.      Dimension #6 - Recovery Environment - Risk 3. Patient reports he lives at his niece's house with his brother. This is a temporary housing situation.   Pt continues to refuse to go to any sober house because.    Specific goals from treatment plan addressed this week: 1:1 to discuss current life situation   Effectiveness of strategies: Poor. Patient missed 1:1 this week and only came for one group.        T) Treatment plan updated no.  Patient notified and in agreement Yes.  Patient educated on Socialization Skill.    Intake was on 2/13/2017. Patient has completed 131 hours at this time. Projected discharge date has changed due to patient's non-attendance. Current discharge plan is TBD.     CYNTHIA Evans  5/4/2018, 9:56 AM         Psycho-Educational Curriculum  Date Attended  Psycho-Educational Curriculum  Date Attended    Acceptance   Shame/Guilt  3/2/17    1st Step   Anger/Rage  3/16/17, 3/21/17, 3/23/17   Affirmations   Mental Health  4/17/18   Automatic Negative Thoughts   Anxiety     Cross Addiction  7/18/17, 7/20/17 Co-Occurring Disorders     Stages of Change  4/12/18  Inge/Bipolar     Relapse  11/16/17 Trauma  3/14/17, 3/16/17    Addictive Thoughts   Victim Identity     Coping Skills  5/2/17 Sober Structure     Relapse Prevention   Continuum of  "Care     Medical Aspects   Non-12 Step Support     Brain/Neurotransmitters  3/30/17 Priorities  4/11/17,4/13/17   Medication Compliance   Spirituality  11/28/17, 3/1/18    VANNA Alcohol/Drug Research   Weekend Planner     Physical Health   Educational Videos     Post Acute Withdrawal   1st Step     Pregnancy and Drug Use   2nd Step     Sexual Health   Assertive Communication     Short-Term/Long-Term Effects  3/30/17 My name is Broderick CANO    Relationships  4/25/17, 4/27/17 Cross Addiction     Assertive Communication   God As We Understood Him     Boundaries   HBO Relapse     Codependence   HBO What Is Addiction     Defense Mechanisms  3/6/18  Medical Aspects 1     Family Roles   Medical Aspects 2     Goodbye Letter   [employer] Geographic: Stress     Intimacy   PBS Depression Out of the Shadows     Needs/Dealbreakers in Relationships   The Anonymous People    Socialization Skills  5/1/18 [place]  7/11/17, 7/13/17   Feelings   John Macedo \"Highjacked Brain\"    ABC Model of Emotion   Srinath Conti Humor in Tx    Grief and Loss   Money/Budget  10/24/17   Healthy vs. Unhealthy Feelings   Goal Setting  8/10/17, 1/2/18    Meditation/Mindfulness  9/7/17, 9/12/17, 9/14/17 Health Goals    Overconfidence/Complacency   Budget  2/21/16, 1/23/17   Resentment   People/Places/Things  6/13/17   Mindfullness  1/9/18     Stress  7/25/17, 7/27/17 CBT  6/21/17, 6/27/17, 6/28/17     "

## 2021-06-17 NOTE — PROGRESS NOTES
D) Patient was at the clinic and called this writer after the group was already over.  Pt said he was at the hospital but did not come into the group room because he was embarrassed of being really late.    A) Met with patient for 40 min.    R) Pt states he forgets time and dates and so far missed 8 probation meetings.  Pt states he lives between his niece's and father's. Pt said he had to deal with storage issue once again this morning.  Pt reprots half of his storage are his ex-girlfriend's and he is not sure to what to do with them.  Pt admits that he feels like living in chaos all the time between broken car and storage issue.    Pt was told that he needs to attend both CD groups on Tuesday and Thursdays to at least help with getting structure in life. Pt states he has never sustained sobriety like this and being in CD treatment has actually helped him even though he keeps missing groups.   T) Pt will resume group attendance from Tuesday. Thursday is PO meeting.  Pt refused reminder call.      ankle pain/injury

## 2021-06-18 NOTE — PROGRESS NOTES
D) Patient completed 2 hours of CD Outpatient/Service Coordination group today.    A) Patient had a swollen right cheek and said his teeth had been really infected.  Patient also said his tooth pain and infection are affecting his neck pain and back pain as well.    R) Met with patient after the group and provided him with contact number for [facility].  They usually accept people without dental insurance.

## 2021-06-18 NOTE — PROGRESS NOTES
"D) Patient called after the group start time.    A) Pt said \"several things happened.  My foot is better. I will be late but I will come to group today.\"   T) Patient never made to group today.    "

## 2021-06-18 NOTE — PROGRESS NOTES
Weekly Progress Note  Porfirio Clarke  1965  [id number]      D)Pt attended one group this week with one absence. Patient had no individual session A) Staff facilitated groups and reviewed tx progress. Assessed for [facility]. R) No VAP needed at this time.   Any significant events, defines as events that impact patients relationship with others inside and outside of treatment No   Indicate any changes or monitoring of physical or mental health problems No     Indicate involvement by any outside supports No  IAPP reviewed and modified as needed. No   Pt working on the following dimensions:    Dimension #1 - Withdrawal Potential - Risk 0. UA on 5/1 was positive for Opiate.  No whitewall symptoms observed reported by patient.  Need explanation on why UA was positive.  Patient has not turn in any UA since then.   Specific goals from treatment plan addressed this week:  Positive UA   Effectiveness of strategies:  Positive UA for opiate.      Dimension #2 - Biomedical - Risk 2. TBI, chronic neck pain.  Pt hs Medicare A and B.  Pt is able to access medical service. PCP at [facility].  Patient had history of failing to any appointments including probation meetings.  Patient did not report any health concern this week.     Superfics goals from treatment:  Patient has Medicare and able to access care if he needs.    Effectiveness of strategies: Effective.      Dimension #3 - Emotional/Behavioral/Cognitive - Risk 3.  Poor attendance.  Patient came to only 1 hour of group this week.    Specific goals from treatment plan addressed this week:  Patient has no adherence to treatment    Effectiveness of strategies: Not effective.      Dimension #4 - Treatment Acceptance/Resistance - Risk 3. On probation with [facility] and [place].   UA positive on 5/1 for opiate. Need explanation from patient but he missed group again on Thursday 5/3.    Specific goals from treatment plan addressed this week: comply with probation and  treatment.    Effectiveness of strategies: Ineffective. Patient only attended 1 hour of CD group this week.     Dimension #5 - Relapse Potential - Risk 3.  Positive UA on 5/1 but no explanation yet. Pt missed both groups despite his agreement  Specific goals from treatment plan addressed this week:  Goal is to get mental health case management and CADI waiver services but due to patient's multiple no shows, he can no longer go to [facility]. Patient has been advised to get DA done at [facility] for many months.    Effectiveness of strategies:  Poor.      Dimension #6 - Recovery Environment - Risk 3. Patient reports he lives between his niece's and father's.   Pt continues to refuse to go to any sober house because he does not want to lose his social security check.     Specific goals from treatment plan addressed this week:  Unable to work due to lack of time.    Effectiveness of strategies: Poor.     T) Treatment plan updated no.  Patient notified and in agreement Yes.  Patient educated on Medication Compliance.    Intake was on 2/13/2017. Patient has completed 133  hours at this time. Projected discharge date has changed due to patient's non-attendance. Current discharge plan is TBD.     CYNTHIA Evans  5/18/2018, 10:59 AM         Psycho-Educational Curriculum  Date Attended  Psycho-Educational Curriculum  Date Attended    Acceptance   Shame/Guilt  3/2/17    1st Step   Anger/Rage  3/16/17, 3/21/17, 3/23/17   Affirmations   Mental Health  4/17/18   Automatic Negative Thoughts   Anxiety     Cross Addiction  7/18/17, 7/20/17 Co-Occurring Disorders     Stages of Change  4/12/18  Inge/Bipolar     Relapse  11/16/17 Trauma  3/14/17, 3/16/17    Addictive Thoughts   Victim Identity     Coping Skills  5/2/17 Sober Structure     Relapse Prevention   Continuum of Care     Medical Aspects   Non-12 Step Support     Brain/Neurotransmitters  3/30/17 Priorities  4/11/17,4/13/17  "  Medication Compliance  5/17/18 Spirituality  11/28/17, 3/1/18    VANNA Alcohol/Drug Research   Weekend Planner     Physical Health   Educational Videos     Post Acute Withdrawal   1st Step     Pregnancy and Drug Use   2nd Step     Sexual Health   Assertive Communication     Short-Term/Long-Term Effects  3/30/17 My name is Broderick CANO    Relationships  4/25/17, 4/27/17 Cross Addiction     Assertive Communication   God As We Understood Him     Boundaries   HBO Relapse     Codependence   HBO What Is Addiction     Defense Mechanisms  3/6/18  Medical Aspects 1     Family Roles   Medical Aspects 2     Goodbye Letter   National Geographic: Stress     Intimacy   PBS Depression Out of the Shadows     short term/long term effects  The Anonymous People    Socialization Skills  5/1/18 [place]  7/11/17, 7/13/17   Feelings   John Macedo \"Highjacked Brain\"    ABC Model of Emotion   Srinath Conti Humor in Tx    Grief and Loss   Money/Budget  10/24/17   Healthy vs. Unhealthy Feelings   Goal Setting  8/10/17, 1/2/18    Meditation/Mindfulness  9/7/17, 9/12/17, 9/14/17 Health Goals    Overconfidence/Complacency   Budget  2/21/16, 1/23/17   Resentment   People/Places/Things  6/13/17   Mindfullness  1/9/18     Stress  7/25/17, 7/27/17 CBT  6/21/17, 6/27/17, 6/28/17     "

## 2021-06-18 NOTE — PROGRESS NOTES
D) Patient completed 2 hours of CD Outpatient/Service Coordination group today.    A) Patient reports he now remembers that he was not on any pain medication at the time of the last positive UA for Opioid, 5/1.  Pt states he had bagle with poppy seeds.

## 2021-06-18 NOTE — PROGRESS NOTES
D)Patient left a message while the CD group was in session   A) Pt explained he could not make to group today because he was meeting with his   T) Pt said he would attend next week.

## 2021-06-18 NOTE — PROGRESS NOTES
D)patient's father called  A) the father said patient won't be attending today's group.  Patient's feet are swollen and the father is taking his to a doctor.

## 2021-06-18 NOTE — PROGRESS NOTES
D) patient called at 4 pm and apologized for not attending group.   A) Patient reports his right foot got swollen last week and experienced pain.    R) Pt reports his father took him to a doctor in [place] and the paperwork took long time.  Pt said he was prescribed Ibuprofen and Amoxicillin.  Pt stats he has infection in his blood.    T) Pt will come to Thursday group and bring in doctor's visit note

## 2021-06-18 NOTE — PROGRESS NOTES
D) Patient completed 1 hour of CD Outpatient/Service Coordination group today.    A)This writer asked patient about the last positive UA from early May. Pt said he has getting prescription pain medication for his foot infection

## 2021-06-18 NOTE — PROGRESS NOTES
Weekly Progress Note  Porfirio Clarke  1965  [id number]      D)Pt attended 1/2 group this week with 1 absence. Patient had no individual session A) Staff facilitated groups and reviewed tx progress. Assessed for [facility]. R) No VAP needed at this time.   Any significant events, defines as events that impact patients relationship with others inside and outside of treatment No   Indicate any changes or monitoring of physical or mental health problems No     Indicate involvement by any outside supports No  IAPP reviewed and modified as needed. No   Pt working on the following dimensions:    Dimension #1 - Withdrawal Potential - Risk 0. UA on 5/1 was positive for Opiate.  UA this week on 6/21 was clean.   No withdrawal concern reported by patient or observed in the short encounter with CD counselor.   Specific goals from treatment plan addressed this week:  Stay sober.   Effectiveness of strategies:  Effective. Patient explains that positive UA on 5/1 was caused by his pain medication for his foot infection.      Dimension #2 - Biomedical - Risk 2. TBI, chronic neck pain.  Pt hs Medicare A and B but does not have any vision or dental insurance.   PCP at [facility].   Patient generally seems to have poor health.  Most recent issues include foot infection and cavities.    Superfics goals from treatment:  Patient has been given phone number to [facility] where they accept people with no insurance.    Effectiveness of strategies: Pending. Patient to follow through with his plan to go to dentist.     Dimension #3 - Emotional/Behavioral/Cognitive - Risk 3.  Poor attendance.  Patient came to one group and attended one hour.   Patient is good at calling to update on his situation however.   Over the past one year, patient maintained the longest sobriety in his life time and he achieved it despite his TBI and memory issues.   Specific goals from treatment plan addressed this week:  Attend group.    Effectiveness of  strategies:  Patient seems to be maintaining sobriety despite his poor health and constant life crisis.  Pt seems he is getting some structure and support in CD groups.       Dimension #4 - Treatment Acceptance/Resistance - Risk 3. On probation with [facility] and [place].   PO reports patient has two pending court cases scheduled at the end of the summer.    Specific goals from treatment plan addressed this week: comply with probation and treatment.    Effectiveness of strategies: This is a area of progress.      Dimension #5 - Relapse Potential - Risk 2.  Patient reports he has had the longest stretches of sobriety since he got involved with outpatient CD treatment.  Risk potential include stress from medical issues, family, financials.  Patient will benefit from case management.    Specific goals from treatment plan addressed this week:  Goal is to get mental health case management and CADI waiver services but due to patient's multiple no shows, he can no longer go to [facility] clinic. Patient has been advised to get DA done at [facility] for many months.    Effectiveness of strategies:  Poor.      Dimension #6 - Recovery Environment - Risk 2. Patient reports he lives between his niece's and father's.   Pt continues to refuse to go to any sober house because he does not want to lose his social security check.  Patient lacks structure and often forgets dates and time.    Specific goals from treatment plan addressed this week: avid living in a van and being homeless.  Stay with family member.    Effectiveness of strategies: Effective.       T) Treatment plan updated no.  Patient notified and in agreement Yes.  Patient educated on    IntSober Structure/Sober Support.  Intake was on 2/13/2017. Patient has completed 135  hours at this time. Projected discharge date has changed due to patient's non-attendance. Current discharge plan is TBD.     CYNTHIA Evans  6/22/2018,  "12:25 PM         Psycho-Educational Curriculum  Date Attended  Psycho-Educational Curriculum  Date Attended    Acceptance   Shame/Guilt  3/2/17    1st Step   Anger/Rage  3/16/17, 3/21/17, 3/23/17   Affirmations   Mental Health  4/17/18   Automatic Negative Thoughts   Anxiety     Cross Addiction  7/18/17, 7/20/17 Co-Occurring Disorders     Stages of Change  4/12/18  Inge/Bipolar     Relapse  11/16/17 Trauma  3/14/17, 3/16/17    Addictive Thoughts   Victim Identity     Coping Skills  5/2/17 Sober Structure  6/12/18    Relapse Prevention   Continuum of Care     Medical Aspects   Non-12 Step Support     Brain/Neurotransmitters  3/30/17 Priorities  4/11/17,4/13/17   Medication Compliance  5/17/18 Spirituality  11/28/17, 3/1/18    VANNA Alcohol/Drug Research   Weekend Planner     Physical Health   Educational Videos     Post Acute Withdrawal   1st Step     Pregnancy and Drug Use   2nd Step     Sexual Health   Assertive Communication     Short-Term/Long-Term Effects  3/30/17 My name is Broderick CANO    Relationships  4/25/17, 4/27/17 Cross Addiction     Assertive Communication   God As We Understood Him     Boundaries   HBO Relapse     Codependence   HBO What Is Addiction     Defense Mechanisms  3/6/18  Medical Aspects 1     Family Roles   Medical Aspects 2     Goodbye Letter   National Geographic: Stress     Intimacy   PBS Depression Out of the Shadows     short term/long term effects  The Anonymous People    Socialization Skills  5/1/18 [place]  7/11/17, 7/13/17   Feelings  6/19/18  John Macedo \"Highjacked Brain\"    ABC Model of Emotion   Srinath Conti Humor in Tx    Grief and Loss   Money/Budget  10/24/17   Healthy vs. Unhealthy Feelings   Goal Setting  8/10/17, 1/2/18    Meditation/Mindfulness  9/7/17, 9/12/17, 9/14/17 Health Goals    Overconfidence/Complacency  5/24/18 Budget  2/21/16, 1/23/17   Resentment   People/Places/Things  6/13/17   Mindfullness  1/9/18     Stress  7/25/17, 7/27/17 CBT  6/21/17, 6/27/17, 6/28/17 "

## 2021-06-18 NOTE — PROGRESS NOTES
Weekly Progress Note  Porfirio Clarke  1965  [id number]      D)Pt attended 1/2 group this week with 1 absence. Patient had no individual session A) Staff facilitated groups and reviewed tx progress. Assessed for [facility]. R) No VAP needed at this time.   Any significant events, defines as events that impact patients relationship with others inside and outside of treatment No   Indicate any changes or monitoring of physical or mental health problems No     Indicate involvement by any outside supports No  IAPP reviewed and modified as needed. No   Pt working on the following dimensions:    Dimension #1 - Withdrawal Potential - Risk 0. UA on 5/1 was positive for Opiate.  Patient has not taken any UA since 5/1.  No withdrawal concern reported by patient or observed in the short encounter with CD counselor.   Specific goals from treatment plan addressed this week:  Stay sober.   Effectiveness of strategies:  Patient is asked to turn in UA.      Dimension #2 - Biomedical - Risk 2. TBI, chronic neck pain.  Pt hs Medicare A and B.  Pt is able to access medical service. PCP at [facility].  Pt came to group on Tuesday with swollen right cheek. Pt said his teeth are infected and he was experiencing pain.  Pt said Medicare does not cover vision or dental and not sure what to do. Pt said he called multiple dentists but the did not accept Medicare.  Telephone number for [facility] was given to patient where they accept people without insurance.    Superfics goals from treatment:  Patient was given phone number to [facility]  Effectiveness of strategies: Pending. Patient to follow through with his plan to go to dentist.     Dimension #3 - Emotional/Behavioral/Cognitive - Risk 3.  Poor attendance.  Patient came to one group for 2 hours on Tuesday.  Pt was no show to Thursday group.  Pt appeared clean and taking care of personal hygiene.  Pt said he is staying between niece's and his father's. Pt states his father  has money but not wanting to pay patient's dental cost.    Specific goals from treatment plan addressed this week:  Very limited due to patient's lack of attendance.  Pt is asked to attend groups on time.     Effectiveness of strategies: When he comes, patient comes to group late.  In the very limited time, patient was assisted with finding [facility].      Dimension #4 - Treatment Acceptance/Resistance - Risk 3. On probation with [facility] and [place].   UA positive on 5/1 for opiate. Need explanation from patient but he missed group again on Thursday 5/3.    Specific goals from treatment plan addressed this week: comply with probation and treatment.    Effectiveness of strategies: Ineffective. Patient only attended 1 hour of CD group this week for 2 hours.      Dimension #5 - Relapse Potential - Risk 2.  Patient reports he is sober for 1 year but has not offered any explanation to 5/3 positive UA. This week, patient looked put together despite his teeth infection.    Specific goals from treatment plan addressed this week:  Goal is to get mental health case management and CADI waiver services but due to patient's multiple no shows, he can no longer go to [facility]. Patient has been advised to get DA done at [facility] for many months.    Effectiveness of strategies:  Poor.      Dimension #6 - Recovery Environment - Risk 2. Patient reports he lives between his niece's and father's.   Pt continues to refuse to go to any sober house because he does not want to lose his social security check.  Patient lacks structure and often forgets dates and time.    Specific goals from treatment plan addressed this week: avid living in a van and being homeless.  Stay with family member.    Effectiveness of strategies: Effective.       T) Treatment plan updated no.  Patient notified and in agreement Yes.  Patient educated on    IntSober Structure/Sober Support.  Intake was on  "2/13/2017. Patient has completed 135  hours at this time. Projected discharge date has changed due to patient's non-attendance. Current discharge plan is TBD.     Heike Nagel [facility]  6/15/2018, 12:20 PM         Psycho-Educational Curriculum  Date Attended  Psycho-Educational Curriculum  Date Attended    Acceptance   Shame/Guilt  3/2/17    1st Step   Anger/Rage  3/16/17, 3/21/17, 3/23/17   Affirmations   Mental Health  4/17/18   Automatic Negative Thoughts   Anxiety     Cross Addiction  7/18/17, 7/20/17 Co-Occurring Disorders     Stages of Change  4/12/18  Inge/Bipolar     Relapse  11/16/17 Trauma  3/14/17, 3/16/17    Addictive Thoughts   Victim Identity     Coping Skills  5/2/17 Sober Structure  6/12/18    Relapse Prevention   Continuum of Care     Medical Aspects   Non-12 Step Support     Brain/Neurotransmitters  3/30/17 Priorities  4/11/17,4/13/17   Medication Compliance  5/17/18 Spirituality  11/28/17, 3/1/18    VANNA Alcohol/Drug Research   Weekend Planner     Physical Health   Educational Videos     Post Acute Withdrawal   1st Step     Pregnancy and Drug Use   2nd Step     Sexual Health   Assertive Communication     Short-Term/Long-Term Effects  3/30/17 My name is Broderick CANO    Relationships  4/25/17, 4/27/17 Cross Addiction     Assertive Communication   God As We Understood Him     Boundaries   HBO Relapse     Codependence   HBO What Is Addiction     Defense Mechanisms  3/6/18  Medical Aspects 1     Family Roles   Medical Aspects 2     Goodbye Letter   National Geographic: Stress     Intimacy   PBS Depression Out of the Shadows     short term/long term effects  The Anonymous People    Socialization Skills  5/1/18 [place]  7/11/17, 7/13/17   Feelings   John Macedo \"Highjacked Brain\"    ABC Model of Emotion   Srinath Conti Humor in Tx    Grief and Loss   Money/Budget  10/24/17   Healthy vs. Unhealthy Feelings   Goal Setting  8/10/17, 1/2/18    Meditation/Mindfulness  9/7/17, 9/12/17, 9/14/17 Health Goals  "   Overconfidence/Complacency  5/24/18 Budget  2/21/16, 1/23/17   Resentment   People/Places/Things  6/13/17   Mindfullness  1/9/18     Stress  7/25/17, 7/27/17 CBT  6/21/17, 6/27/17, 6/28/17

## 2021-06-18 NOTE — PROGRESS NOTES
[facility]  A) PO states she would be visiting the pateint today at his niece's.  Informed PO that patient is still lacking group attendance but he is staying sober.  PO states patient has two upcoming court cases at the end of the summer that got continued and he is expected to comply with probation and treatment.

## 2021-06-18 NOTE — PROGRESS NOTES
Weekly Progress Note  Porfirio Clarke  1965  [id number]      D)Pt attended 1/3 group this week with 2 absence. Patient had no individual session A) Staff facilitated groups and reviewed tx progress. Assessed for [facility]. R) No VAP needed at this time.   Any significant events, defines as events that impact patients relationship with others inside and outside of treatment No   Indicate any changes or monitoring of physical or mental health problems No     Indicate involvement by any outside supports No  IAPP reviewed and modified as needed. No   Pt working on the following dimensions:    Dimension #1 - Withdrawal Potential - Risk 0. UA on 5/1 was positive for Opiate.  Patient has not taken any UA since 5/1 and there is no way to prove his sobriety.  Patient did not report any withdrawal concern this week.  Patient's  contact with CD counselors very limited due to his lack of attendance. It is difficult to tell whether is is using or not.    Specific goals from treatment plan addressed this week:  Stay sober.   Effectiveness of strategies:  Not effective.  Positive UA for opiate on 5/1 and no UAs since.      Dimension #2 - Biomedical - Risk 2. TBI, chronic neck pain.  Pt hs Medicare A and B.  Pt is able to access medical service. PCP at [facility].  Patient had history of failing to any appointments including probation meetings.  Patient did not report any health concern this week.     Superfics goals from treatment:  Patient has Medicare and able to access care if he needs.    Effectiveness of strategies: Effective.      Dimension #3 - Emotional/Behavioral/Cognitive - Risk 3.  Poor attendance.  Pt seems to be completely unaware of his problems in life. Pt continues to own cars and keep fixing them.  Pt has majority of his items in storage and keep paying storage fee monthly.  Pt seems to be unaware that those issues has been repeatedly caused financial problems in the past.    Specific goals from treatment  plan addressed this week:  Patient has no adherence to treatment    Effectiveness of strategies: Patient reports he is sober for one year, but there has been no UA turned in.  The last UA from 5/1 was positive for opioid.      Dimension #4 - Treatment Acceptance/Resistance - Risk 3. On probation with [facility] and [place].   UA positive on 5/1 for opiate. Need explanation from patient but he missed group again on Thursday 5/3.    Specific goals from treatment plan addressed this week: comply with probation and treatment.    Effectiveness of strategies: Ineffective. Patient only attended 1 hour of CD group this week.     Dimension #5 - Relapse Potential - Risk 3.  Patient reports he is sober for 1 year but his lack of treatment adherence does not support his sobriety.  Pt had positive UA on 5/1 for opioid.  Multiple attempts have been made to help patient with getting extra help but patient keeps missing all appointments.    Specific goals from treatment plan addressed this week:  Goal is to get mental health case management and CADI waiver services but due to patient's multiple no shows, he can no longer go to [facility] health clinic. Patient has been advised to get DA done at [facility] for many months.    Effectiveness of strategies:  Poor.      Dimension #6 - Recovery Environment - Risk 3. Patient reports he lives between his niece's and father's.   Pt continues to refuse to go to any sober house because he does not want to lose his social security check.     Specific goals from treatment plan addressed this week:  Unable to work due to lack of time.    Effectiveness of strategies: Poor.     T) Treatment plan updated no.  Patient notified and in agreement Yes.  Patient educated on Medication Compliance.    Intake was on 2/13/2017. Patient has completed 133  hours at this time. Projected discharge date has changed due to patient's non-attendance. Current discharge plan is TBD.  "    Heike Nagel, [facility]  5/25/2018, 9:50 AM         Psycho-Educational Curriculum  Date Attended  Psycho-Educational Curriculum  Date Attended    Acceptance   Shame/Guilt  3/2/17    1st Step   Anger/Rage  3/16/17, 3/21/17, 3/23/17   Affirmations   Mental Health  4/17/18   Automatic Negative Thoughts   Anxiety     Cross Addiction  7/18/17, 7/20/17 Co-Occurring Disorders     Stages of Change  4/12/18  Inge/Bipolar     Relapse  11/16/17 Trauma  3/14/17, 3/16/17    Addictive Thoughts   Victim Identity     Coping Skills  5/2/17 Sober Structure     Relapse Prevention   Continuum of Care     Medical Aspects   Non-12 Step Support     Brain/Neurotransmitters  3/30/17 Priorities  4/11/17,4/13/17   Medication Compliance  5/17/18 Spirituality  11/28/17, 3/1/18    VANNA Alcohol/Drug Research   Weekend Planner     Physical Health   Educational Videos     Post Acute Withdrawal   1st Step     Pregnancy and Drug Use   2nd Step     Sexual Health   Assertive Communication     Short-Term/Long-Term Effects  3/30/17 My name is Broderick CANO    Relationships  4/25/17, 4/27/17 Cross Addiction     Assertive Communication   God As We Understood Him     Boundaries   HBO Relapse     Codependence   HBO What Is Addiction     Defense Mechanisms  3/6/18  Medical Aspects 1     Family Roles   Medical Aspects 2     Goodbye Letter   National Geographic: Stress     Intimacy   PBS Depression Out of the Shadows     short term/long term effects  The Anonymous People    Socialization Skills  5/1/18 [place]  7/11/17, 7/13/17   Feelings   John Macedo \"Highjacked Brain\"    ABC Model of Emotion   Srinath Conti Humor in Tx    Grief and Loss   Money/Budget  10/24/17   Healthy vs. Unhealthy Feelings   Goal Setting  8/10/17, 1/2/18    Meditation/Mindfulness  9/7/17, 9/12/17, 9/14/17 Health Goals    Overconfidence/Complacency  5/24/18 Budget  2/21/16, 1/23/17   Resentment   People/Places/Things  6/13/17   Mindfullness  1/9/18     Stress  7/25/17, 7/27/17 CBT  " 6/21/17, 6/27/17, 6/28/17

## 2021-06-19 NOTE — PROGRESS NOTES
Weekly Progress Note  Porfirio Clarke  1965  [id number]      D)Pt attended 1/2 group this week with one absence. Patient had no individual session A) Staff facilitated groups and reviewed tx progress. Assessed for [facility]. R) No VAP needed at this time.   Any significant events, defines as events that impact patients relationship with others inside and outside of treatment No   Indicate any changes or monitoring of physical or mental health problems No     Indicate involvement by any outside supports No  IAPP reviewed and modified as needed. No   Pt working on the following dimensions:    Dimension #1 - Withdrawal Potential - Risk 0. UA clean this week.   No withdrawal concern reported by patient or observed in the short encounter with CD counselor.   Specific goals from treatment plan addressed this week:  Stay sober.   Effectiveness of strategies:  Effective.     Dimension #2 - Biomedical - Risk 2. TBI, chronic neck pain.  Pt hs Medicare A and B but does not have any vision or dental insurance.   Patient was assisted to enroll in Part D prescription coverage called [employer] for year 2018.   PCP at [facility].   Patient generally seems to have poor health.  Most recent issues include foot infection and cavities.  Patient was instructed to get to [facility] where they accept patients with out dental insurance.  Pt reports he went to [facility] and received some antibiotic medications. Pt states they scheduled an appointment for him for September.  Pt admitted that he has not paid [employer] monthly premium and do not have access for medication coverage.   This week patient could not attend Thursday group stating he believed his foot bone infection is back.    Superfics goals from treatment:  Patient has multiple medical issues. He has Medicare but cort[place] not qualify to have MA or PMAP due to income over qualification.  Pt is enrolled in Part D prescription coverage for the year 2018 but he has not paid  premiums.    Effectiveness of strategies: Poor.      Dimension #3 - Emotional/Behavioral/Cognitive - Risk 3.  Poor attendance.  Patient came to Tuesday group only for 1 hour and missed Thursday group.  Pt called later stating he was having recurring issue with his foot. Pt believed his foot infection is back with pain.    Personal hygiene seems improved.    Specific goals from treatment plan addressed this week: historically poor group attendance but patient states he is maintaining sobriety.     Effectiveness of strategies:  Poor CD group attendance but he is starting to show up even when he is late       Dimension #4 - Treatment Acceptance/Resistance - Risk 3. On probation with [facility] and [place].   PO reports patient has two pending court cases scheduled at the end of the summer.    Specific goals from treatment plan addressed this week: comply with probation and treatment.    Effectiveness of strategies: This is a area of progress.  Pt complying CD treatment in terms of sobriety.      Dimension #5 - Relapse Potential - Risk 2.  This is patient's longest sobriety in his lifetime. Pt struggles with remembering dates and time and frequently misses groups. Pt reports this week that his pain and infection is back.    Specific goals from treatment plan addressed this week:  Goal is to get mental health case management and CADI waiver services. [facility] is not accepting patient due to multiple cancellations.  Patient has been advised to get DA done at [facility] for many months.    Effectiveness of strategies:  Patient will benefit from mental health case management or CADI services.       Dimension #6 - Recovery Environment - Risk 3. Patient reports he lives between his niece's and father's.   Pt continues to refuse to go to any sober house because he does not want to lose his social security check.  Patient lacks structure and often forgets dates and time.  Patient  revives good amount of social security disability benefit and does not qualify for any [facility] welfare.    Specific goals from treatment plan addressed this week: avid living in a van and being homeless.  Stay with family member.    Effectiveness of strategies: Effective.       T) Treatment plan updated no.  Patient notified and in agreement Yes.  Patient educated on Holiday and Stress.  Intake was on 2/13/2017. Patient has completed 136  hours at this time. Projected discharge date has changed due to patient's non-attendance. Current discharge plan is TBD.     CYNTHIA Evans  7/6/2018, 10:33 AM         Psycho-Educational Curriculum  Date Attended  Psycho-Educational Curriculum  Date Attended    Acceptance   Shame/Guilt  3/2/17    1st Step   Anger/Rage  3/16/17, 3/21/17, 3/23/17   Affirmations   Mental Health  4/17/18   Automatic Negative Thoughts   Anxiety     Cross Addiction  7/18/17, 7/20/17 Co-Occurring Disorders     Stages of Change  4/12/18  Inge/Bipolar     Relapse  11/16/17 Trauma  3/14/17, 3/16/17    Addictive Thoughts   Victim Identity     Coping Skills  5/2/17 Sober Structure  6/12/18    Relapse Prevention   Continuum of Care     Medical Aspects   Non-12 Step Support     Brain/Neurotransmitters  3/30/17 Priorities  4/11/17,4/13/17   Medication Compliance  5/17/18 Spirituality  11/28/17, 3/1/18    VANNA Alcohol/Drug Research   Weekend Planner     Physical Health   Educational Videos     Post Acute Withdrawal   1st Step     Pregnancy and Drug Use   2nd Step     Sexual Health   Assertive Communication     Short-Term/Long-Term Effects  3/30/17 My name is Broderick AYALASal    Relationships  4/25/17, 4/27/17 Cross Addiction     Assertive Communication   God As We Understood Him     Boundaries   HBO Relapse     Codependence   HBO What Is Addiction     Defense Mechanisms  3/6/18  Medical Aspects 1     Family Roles   Medical Aspects 2     Goodbye Letter   National Geographic: Stress     Intimacy   PBS Depression Out  "of the Shadows     short term/long term effects  The Anonymous People    Socialization Skills  5/1/18 [place]  7/11/17, 7/13/17   Feelings  6/19/18  John Macedo \"Highjacked Brain\"    ABC Model of Emotion   Holiday Stress  7/3/18    Grief and Loss   Money/Budget  10/24/17   Healthy vs. Unhealthy Feelings   Goal Setting  8/10/17, 1/2/18    Meditation/Mindfulness  9/7/17, 9/12/17, 9/14/17 Health Goals    Overconfidence/Complacency  5/24/18 Budget  2/21/16, 1/23/17   Resentment   People/Places/Things  6/13/17   Mindfullness  1/9/18 8 Dimensions of Wellness  6/26/18    Stress  7/25/17, 7/27/17 CBT  6/21/17, 6/27/17, 6/28/17     "

## 2021-06-19 NOTE — PROGRESS NOTES
Porfirio Clrake attended 1 hour of group therapy today. Ct was 2 hours late.    7/10/2018 3:46 PM Rach Nassar

## 2021-06-19 NOTE — PROGRESS NOTES
D) Patient called stating he will be late for group today.     A) Pt said he was at [facility] with his father and heading back to the [facility].

## 2021-06-19 NOTE — PROGRESS NOTES
D) Pt came in late and attended 1 hour of group.   A) Pt states his case in [facility] got dropped.
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
## 2021-06-19 NOTE — PROGRESS NOTES
Unable to chart Weekly Progress/Weekly Review due to patient having no face-to-face contact/attendance.
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
## 2021-06-19 NOTE — PROGRESS NOTES
LATE ENTRY for 8/17/18  Unable to chart Weekly Progress/Weekly Review. There was no face-to-face contact with the patient.    Rach Nassar MA, [facility]

## 2021-06-19 NOTE — PROGRESS NOTES
Weekly Progress Note  Porfirio Clarke  1965  [id number]      D)Pt attended 1/3 group this week with 2 absence. Patient had no individual session A) Staff facilitated groups and reviewed tx progress. Assessed for VA. R) No VAP needed at this time.   Any significant events, defines as events that impact patients relationship with others inside and outside of treatment No   Indicate any changes or monitoring of physical or mental health problems No     Indicate involvement by any outside supports No  IAPP reviewed and modified as needed. No   Pt working on the following dimensions:    Dimension #1 - Withdrawal Potential - Risk 0. UA clean this week.   No withdrawal concern reported by patient or observed in the short encounter with CD counselor.   Specific goals from treatment plan addressed this week:  Stay sober.   Effectiveness of strategies:  Effective.     Dimension #2 - Biomedical - Risk 2. TBI, chronic neck pain.  Pt hs Medicare A and B but does not have any vision or dental insurance.   Patient was assisted to enroll in Part D prescription coverage called [employer] for year 2018.   PCP at [facility].   Patient generally seems to have poor health.  Most recent issues include foot infection and cavities.  Patient was instructed to get to [facility] where they accept patients with out dental insurance.  Pt reports he went to [facility] and received some antibiotic medications. Pt states they scheduled an appointment for him for September.  Pt admitted that he has not paid Silver [employer] monthly premium and do not have access for medication coverage.    Superfics goals from treatment:  Patient has multiple medical issues. He has Medicare but [place] not qualify to have MA or PMAP due to income over qualification.  Pt is enrolled in Part D prescription coverage for the year 2018 but he has not paid premiums.    Effectiveness of strategies: Poor.      Dimension #3 - Emotional/Behavioral/Cognitive - Risk  3.  Poor attendance.  Patient came to one group this week for 2 hours.  Pt said he had a court on 6/26 but he was late.  Pt said he got the the court room late and decided to leave before hearing the sentence.    Specific goals from treatment plan addressed this week: historically poor group attendance bu the is maintaining sobriety.    Effectiveness of strategies:  Poor CD group attendance but he is starting to show up even when he is late       Dimension #4 - Treatment Acceptance/Resistance - Risk 3. On probation with [facility] and [place].   PO reports patient has two pending court cases scheduled at the end of the summer.    Specific goals from treatment plan addressed this week: comply with probation and treatment.    Effectiveness of strategies: This is a area of progress.  Pt complying CD treatment in terms of sobriety.      Dimension #5 - Relapse Potential - Risk 2.  This is patient's longest sobriety in his lifetime. Pt struggles with memory and health issues but somehow maintaining sobriety with minimum CD groups.    Specific goals from treatment plan addressed this week:  Goal is to get mental health case management and CADI waiver services. [facility] mental health clinic is not accepting patient due to multiple cancellations.  Patient has been advised to get DA done at [facility] for many months.    Effectiveness of strategies:  Patient will benefit from mental health case management or CADI services.       Dimension #6 - Recovery Environment - Risk 3. Patient reports he lives between his niece's and father's.   Pt continues to refuse to go to any sober house because he does not want to lose his social security check.  Patient lacks structure and often forgets dates and time.  Patient revives good amount of social security disability benefit and does not qualify for any [facility] welfare.    Specific goals from treatment plan addressed this week: avid living in a van and being  "homeless.  Stay with family member.    Effectiveness of strategies: Effective.       T) Treatment plan updated no.  Patient notified and in agreement Yes.  Patient educated on    IntSober Structure/Sober Support.  Intake was on 2/13/2017. Patient has completed 135  hours at this time. Projected discharge date has changed due to patient's non-attendance. Current discharge plan is TBD.     Heike KwongShona, [facility]  6/29/2018, 11:03 AM         Psycho-Educational Curriculum  Date Attended  Psycho-Educational Curriculum  Date Attended    Acceptance   Shame/Guilt  3/2/17    1st Step   Anger/Rage  3/16/17, 3/21/17, 3/23/17   Affirmations   Mental Health  4/17/18   Automatic Negative Thoughts   Anxiety     Cross Addiction  7/18/17, 7/20/17 Co-Occurring Disorders     Stages of Change  4/12/18  Inge/Bipolar     Relapse  11/16/17 Trauma  3/14/17, 3/16/17    Addictive Thoughts   Victim Identity     Coping Skills  5/2/17 Sober Structure  6/12/18    Relapse Prevention   Continuum of Care     Medical Aspects   Non-12 Step Support     Brain/Neurotransmitters  3/30/17 Priorities  4/11/17,4/13/17   Medication Compliance  5/17/18 Spirituality  11/28/17, 3/1/18    VANNA Alcohol/Drug Research   Weekend Planner     Physical Health   Educational Videos     Post Acute Withdrawal   1st Step     Pregnancy and Drug Use   2nd Step     Sexual Health   Assertive Communication     Short-Term/Long-Term Effects  3/30/17 My name is Broderick [place]  4/25/17, 4/27/17 Cross Addiction     Assertive Communication   God As We Understood Him     Boundaries   HBO Relapse     Codependence   HBO What Is Addiction     Defense Mechanisms  3/6/18  Medical Aspects 1     Family Roles   Medical Aspects 2     Goodbye Letter   National Geographic: Stress     Intimacy   PBS Depression Out of the Shadows     short term/long term effects  The Anonymous People    Socialization Skills  5/1/18 [place]  7/11/17, 7/13/17   Feelings  6/19/18  John Macedo \"Highjacked " "Brain\"    ABC Model of Emotion   Srinath Conti Humor in [place]    Grief and Loss   Money/Budget  10/24/17   Healthy vs. Unhealthy Feelings   Goal Setting  8/10/17, 1/2/18    Meditation/Mindfulness  9/7/17, 9/12/17, 9/14/17 Health Goals    Overconfidence/Complacency  5/24/18 Budget  2/21/16, 1/23/17   Resentment   People/Places/Things  6/13/17   Mindfullness  1/9/18 8 Dimensions of Wellness  6/26/18    Stress  7/25/17, 7/27/17 CBT  6/21/17, 6/27/17, 6/28/17     "

## 2021-06-19 NOTE — PROGRESS NOTES
D)Patient called after the group and explained why he was not in group today  A) Pt said he was at [facility] court for driving after cancellation charge.  Pt states he has 4 other pending cases coming up (2 criminal cases in [facility], 1 traffic in [facility] and 1 probation violation hearing in [facility])

## 2021-06-19 NOTE — PROGRESS NOTES
Weekly Progress Note  Porfirio Clarke  1965  [id number]      D)Pt attended 1/2 group this week with one absence. Patient had no individual session A) Staff facilitated groups and reviewed tx progress. Assessed for [facility]. R) No VAP needed at this time.   Any significant events, defines as events that impact patients relationship with others inside and outside of treatment: No   Indicate any changes or monitoring of physical or mental health problems:No     Indicate involvement by any outside supports No  IAPP reviewed and modified as needed. No   Pt working on the following dimensions:    Dimension #1 - Withdrawal Potential - Risk 0. Last UA collected dates from 6/26.  Pt has been asked to turn in UA whenever he attends group. Pt did not report any withdrawal concern this week.    Specific goals from treatment plan addressed this week:  Stay sober.   Effectiveness of strategies:  Pending. Pt has not turned in UA since 6/26.      Dimension #2 - Biomedical - Risk 2. TBI, chronic neck pain.  Pt hs Medicare A and B but does not have any vision or dental insurance.   Patient was assisted to enroll in Part D prescription coverage called [employer] for year 2018.   PCP at [facility].   Pt historically has poor health and often misses medical follow up appointments.   Superfics goals from treatment:  Patient has multiple medical issues. He has Medicare but cortez not qualify to have MA or PMAP due to income over qualification.  Pt is enrolled in Part D prescription coverage for the year 2018 but he has not paid premiums.    Effectiveness of strategies: Poor.  Patient has poor ability to follow up with appointments and frequently uses ERs.      Dimension #3 - Emotional/Behavioral/Cognitive - Risk 3.  Poor attendance.  Patient came to 1 hour of group this week.     Specific goals from treatment plan addressed this week: historically poor group attendance but patient states he is maintaining sobriety.     Effectiveness  of strategies:  Poor CD group attendance      Dimension #4 - Treatment Acceptance/Resistance - Risk 3. On probation with [facility] and [place].   PO reports patient has two pending court cases scheduled at the end of the summer.    Specific goals from treatment plan addressed this week: comply with probation and treatment.    Effectiveness of strategies: This is a area of progress.  Pt complying CD treatment in terms of sobriety.      Dimension #5 - Relapse Potential - Risk 2.  Pt states he is maintaining the longest sobriety in his life.  Pt struggles with remembering dates and time and frequently misses groups. Pt seems he is no longer having vehicle issues.  Pt's personal hydergine seems improved as well.    Effectiveness of strategies:  Patient will benefit from mental health case management or CADI services.       Dimension #6 - Recovery Environment - Risk 2. Patient reports he lives between his niece's and father's.   Pt continues to refuse to go to any sober house because he does not want to lose his social security check.  Patient lacks structure and often forgets dates and time.  Patient revives good amount of social security disability benefit and does not qualify for any county welfare.    Specific goals from treatment plan addressed this week: avid living in a van and being homeless.  Stay with family member.    Effectiveness of strategies: Effective.       T) Treatment plan updated no.  Patient notified and in agreement Yes.  Patient educated on Emotions.  Intake was on 2/13/2017. Patient has completed 137  hours at this time. Projected discharge date has changed due to patient's non-attendance. Current discharge plan is TBD.     CYNTHIA Evans  7/12/2018, 4:38 PM         Psycho-Educational Curriculum  Date Attended  Psycho-Educational Curriculum  Date Attended    Acceptance   Shame/Guilt  3/2/17    1st Step   Anger/Rage  3/16/17, 3/21/17, 3/23/17   Affirmations   Mental Health   "4/17/18   Automatic Negative Thoughts   Anxiety     Cross Addiction  7/18/17, 7/20/17 Co-Occurring Disorders     Stages of Change  4/12/18  Inge/Bipolar     Relapse  11/16/17 Trauma  3/14/17, 3/16/17    Addictive Thoughts   Victim Identity     Coping Skills  5/2/17 Sober Structure  6/12/18    Relapse Prevention   Continuum of Care     Medical Aspects   Non-12 Step Support     Brain/Neurotransmitters  3/30/17 Priorities  4/11/17,4/13/17   Medication Compliance  5/17/18 Spirituality  11/28/17, 3/1/18    VANNA Alcohol/Drug Research   Weekend Planner     Physical Health   Educational Videos     Post Acute Withdrawal   1st Step     Pregnancy and Drug Use   2nd Step     Sexual Health   Assertive Communication     Short-Term/Long-Term Effects  3/30/17 My name is Broderick CANO    Relationships  4/25/17, 4/27/17 Cross Addiction     Assertive Communication   God As We Understood Him     Boundaries   HBO Relapse     Codependence   HBO What Is Addiction     Defense Mechanisms  3/6/18  Medical Aspects 1     Family Roles   Medical Aspects 2     Emotions  7/10/18  [employer]: Stress     Intimacy   PBS Depression Out of the Shadows     short term/long term effects  The Barnacle People    Socialization Skills  5/1/18 [place]  7/11/17, 7/13/17   Feelings  6/19/18  John Macedo \"Highjacked Brain\"    ABC Model of Emotion   Holiday Stress  7/3/18    Grief and Loss   Money/Budget  10/24/17   Healthy vs. Unhealthy Feelings   Goal Setting  8/10/17, 1/2/18    Meditation/Mindfulness  9/7/17, 9/12/17, 9/14/17 Health Goals    Overconfidence/Complacency  5/24/18 Budget  2/21/16, 1/23/17   Resentment   People/Places/Things  6/13/17   Mindfullness  1/9/18 8 Dimensions of Wellness  6/26/18    Stress  7/25/17, 7/27/17 CBT  6/21/17, 6/27/17, 6/28/17     "

## 2021-06-19 NOTE — PROGRESS NOTES
D)Patient called stating he could not attend today's group because he was at a [employer] with his father   A) Pt said his father took him there to work on getting pension.

## 2021-06-19 NOTE — PROGRESS NOTES
D) Patient called stating he maybe getting the bone infection again.    A) Pt states his left foot is sore and swollen and right foot has discoloration of purple.    R) Pt states he went to UR (Urgent Care) in [place] but did not get proper treatment.     Pt states he will go to ER if it gets to be very intolerable.  Pt states he is in pain and can feel a knot in his groin/leg.    T) Pt states he should stay away from the group for now because he is concerned this might be infectious. This writer told patient to seek medical treatment.

## 2021-06-20 NOTE — PROGRESS NOTES
Unable to chart weekly Progress/Weekly Treatment Plan Review due to lack of face-to-face contact with the patient.

## 2021-06-20 NOTE — PROGRESS NOTES
[facility] SUBSTANCE USE DISORDER  DISCHARGE SUMMARY            Name:  Porfirio Clarke   :  Heike Nagel [facility]   Admit Date: 2017    Discharge Date: 2018   :  1965   Hours Completed: 152 hours    Initial Diagnosis:    Amphetamine Use Disorder, Severe  Cannabis Use Disorder, Severe  Final Diagnosis:    Amphetamine Use Disorder, Severe  Cannabis Use Disorder, Severe    Discharge Address:    [address] [place] [address]   Funding Source:    Medicare A and B      Discharge Type:  Absent Without Leave (AWOL)    Client was receiving residential services at the time of discharge:   No      Reasons for and circumstances of service termination:  Patient lacked treatment involvement toward the end.  There was no face-to-face contact over a month and he wad discharged at AWOL.         If program discharge status was At Staff Request, the license dietz must identify the following:    Other interested parties conferred with: N/A     Referrals provided: N/A    Alternatives considered and attempted before deciding to discharge:  N/A       Dimension/Course of Treatment/Individualized Care:   1.  Withdrawal Potential - Intake Risk level - 0, Discharge Risk level - 0.   Narrative supporting risk description:  Patient reported at intake he was not going through any withdrawals.  Pt reported his last use of meth on 1/10/17 and marijuana on 17.    Treatment plan goals and progress towards those goals:  Patient had one positive UA on 17 for amphetamine.  Patient reports he maintained sobriety since then.  Patient's last UA dates from 18 and he lost contact with treatment.      2.  Biomedical Conditions and Complications - Intake Risk level - 1, Discharge Risk level - 2  Narrative supporting risk description:  At intake patient reported the following as his medical issues: peripheral nerve disease, chronic pain syndrome, osteomyelitis of spine, TBI.  Pt reported he did not  have any primary care doctor and took himself off medications because he could not afford the co-payment.    Treatment plan goals and progress towards those goals: Patient's on-going medical issues interfared with treatment participation, especially his memory issues from TBI.  Pt frequently reported infected toe or chronic pain that prevented him from attending the CD treatment. Pt was assisted in getting Part D coverage, [employer] for the year 2018.  Pt later reported he did not keep up with monthly payment.  Pt was recommended to set up a primary care and he visited a doctor at [facility].  Pt also reported cavities and infections. Pt was suggested to go [facility].  Pt said he visited the clinic and received the antibiotics but was convinced he would forget the follow up appointment in September.  At discharge, patient is recommended to maintain his primary clinic visit, maintain Medicare and Part D coverage so he can access medical care.       3.  Emotional/Behavioral/Cognitive Conditions and Complications - Intake Risk level - 3,  Discharge Risk level - 3  Narrative supporting risk description:  At intake patient reported mental health diagnosis of PTSD, BIpolar Type 1, Anxiety Disorder, and Depression.  Pt did not have menta health providers.  Pt reported inactive suicidal thoughts without any plan.    Treatment plan goals and progress towards those goals: Patient was scheduled to see both psychotherapist and psychiatrist.  Patient failed to show up too many times and he was discharged from [facility] mental health clinic.  Patient was recommended to get walk-in diagnostic assessment done at [facility]. Pt seemed he was aware of it and occasionally talked about his need to get there but did not follow through.  When patient attended CD group, he was actively engaged, but patient's attendance suffered.  Usually reason for not being able to attend the groups were:  simply forgot due to TBI, pain and other medical issues, broken vehicle.  Pt seemed he achieved sobriety however according to the UA results.  Patient was discharged due to no face-to-face contact over a month.  At discharge, patient is recommended to initiate mental health services.  If CD further CD treatment is necessary, patient needs to schedule and rule 25 assessment to get a proper referral for CD care.       4.  Readiness for Change - Intake Risk level -  3,  Discharge Risk level - 3  Narrative supporting risk description:  Patient was mandated by [facility] to complete CD treatment.  Pt had multiple felonies.  Patient stated meth use became a problem and he needed to work on that issue.    Treatment plan goals and progress towards those goals:  Patient had a good attendance till July 2017 and his attendance suffered greatly.  He contact with CD treatment became weekly to monthly to none.  Patient's legal issues seems increased during while he was in treatment. Pt would often called in and said he had to appear to the courts.  By summer of 2018, patient reported he had 4 or 5 pending criminal cases from several [facility].  Patient was also on probatio with [place].  Pt also indicated he had a violation hearing in [place], [place].  At discharge patient was recommended to tend all his court cases.  Pt is recommended to remain sober to prevent further criminal charges.       5.  Relapse/Continued Use/Continued Problem Potential - Intake Risk level -  3,  Discharge Risk level - 2  Narrative supporting risk description:  At intake patient reported he used marijuana to cope with chronic pain. Pt admitted he used it immediately after he came out of inpatient CD treatment.    Treatment plan goals and progress towards those goals:  During this outpatient treatment, patient reportedd he stayed sober.  Patient's CD treatment attendance was sporadic but his UA showed he was sober.  Last  "positive UA was from 6/8/17.  Pt frequently reported \"I don't know why (Unfortunate) things keeps happening.\"  Patient frequently had crisis-like situations where he had to manage urgently.  Those situation included: car breaking down, car broken in, girlfriend unmanageable with her mental health, choosing to live in a van, storage unit broken in, etc.  Pt seems he stayed sober without gaining skills.   At discharge, patient was recommended to seek mental health evaluation and mental health services.       6.  Recovery Environment - Intake Risk level -  2,  Discharge Risk level - 3  Narrative supporting risk description:  At intake, patient was living at his father's.  Patient was on social security disability and receiving good income.  Pt did not qualify for any county benefits (MA, Food Stamp) due to his high disability income.   Pt reported he did not have any association with community support groups.  Pt reported he had to move out of his father's because he did not have a good relationship.    Treatment plan goals and progress towards those goals:  During this treatment, patient kept moving around multiple times. At one point, he lived in his van with his female friend Valeria whom he said was helping out.  After moving around numerous times between friend's, patient reported he settled into his niece's and father's at the end.  During this treatment, pateint did not gain structured, meaningful activities.  Patient had increased criminal justice system involved.  At discharge, patient is recommended to seek mental health services, work with probation, and find acommunity support group.  Pt is asked to seek rule 25 assessment if he had any increased chemical use in recent weeks since the last contact with the CD treatment.     Strengths: Humorous, Kindness, out-going, positive, Honest.    Needs: Mental health service, pending legal issues.    Services Provided: Intake, assessment, treatment planning, education, " group discussion, film, lectures, 1x1 therapy, and recommendations at discharge.        Program Involvement: Patient had a good involvement for the first 5 months and his involvement suffered after that   Attendance: Poor  Ability to relate in group/   Other program activities: Good  Assignment Completion: Poor  Overall Behavior: Poor  Reported Family/Significant   Other Involvement: Good    Prognosis: Guarded      Recommendations       Patient to continue working with [facility].  If further CD treatmetn is necessary, schedule rule 25 assessment.      Mental Health Referral  Patient was recommended to have DA and on-going therapy throughout this CD treatment but he failed.  Pt is recommended to seek [facility] health services at discharge.        Physical Health Referral:    [facility].  [facility], [address], [place] [address].   Primary physician: Dr. Garza.     [phone].           Counselor Name and Title:  CYNTHIA Evans        Date:  9/6/2018  Time:  10:48 AM

## 2021-06-20 NOTE — PROGRESS NOTES
D)contacted both  and the patient about the discharge at [facility]  T)Probation stated she has not seen the patient for a while and said he had more contact with CD treatment than probation.  PO said patient has several pending court cases and now living in a van by his storage.  dsicharge summary fas faxed as of yesterday to PO.

## 2021-06-20 NOTE — PROGRESS NOTES
D) Patient left a message at 1:30pm after the group started  A) Pt said he was on his way but never made to group.

## 2021-06-20 NOTE — PROGRESS NOTES
Unable to chart Weekly Progress/Weekly Treatment Plan Review due to no face-to-face patient hours.

## 2021-06-20 NOTE — PROGRESS NOTES
Pt called and said [facility] wants him to turn himself in.  PT said he went to [facility] and they could not take him in because he  Has two active warrants from different [facility].  One from [place] and the ohther from [place].    A) Pt said his wounds in his leg are still not healed.  Pt was encouraged to go urgent care before it gets worse.  Pt was worried he was already kicked out of treatment. Pt was inforemd he would be discharged after 30 days of no face-to-face contact.    T)Pt said he would attend group on Tuesday.

## 2021-07-03 NOTE — ADDENDUM NOTE
Addendum Note by Nasra Ramos MD at 2/23/2017 10:48 AM     Author: Nasra Ramos MD Service: -- Author Type: Physician    Filed: 2/23/2017 10:48 AM Encounter Date: 2/13/2017 Status: Signed    : Nasra Ramos MD (Physician)    Addended by: NASRA RAMOS on: 2/23/2017 10:48 AM        Modules accepted: Orders

## 2021-07-04 NOTE — PROGRESS NOTES
Rule 31 by Heike Nagel LADC at 2017 11:00 AM     Author: Heike Nagel LADC Service: -- Author Type: Licensed Alcohol and Drug Counselor    Filed: 2017 12:06 PM Encounter Date: 2017 Status: Signed    : Heike Nagel LADC (Licensed Alcohol and Drug Counselor)         [facility] Assessment Summary  Date: 2017        : CYNTHIA Evans    Name: Porfirio Clarke  Address: [address] [place] [address]    Phone: [phone] (home)   Referral Source:   : 1965  Age: 51 y.o.  Race/Ethnicity: White or   Marital Status: single   Employment:  Disabled and retired                                                                                                                   Level of Education: 12th GED               Socio-economic (yearly Income) Status: $1100/month   Sexual Orientation: Heterosexual         Last 4  of Social Security:     Reason for seeking services  Patient admitted using meth and other drugs to his  and asked to get Rule 25 completed.   Patient completed [facility] Inpatient CD treatment form 1/10- and was referred to OP treatment.      Dimension I Acute intoxication/Withdrawal Potential:    Symptomology (past 12 months, check all that apply)  []Increased tolerance, []passing out, [x] binges, [x]AM use, []weekly intoxication, [] decreased tolerance, [] blackouts, [x]secretive use, [x]preoccupation, [x]protecting supply, [] hurried ingestion, [x]medicinal use, [x]using alone, []repeated family or social problems, [x] mood swings, [x]loss of control, [x]family history of addiction    Observed or reported (withdrawal symptoms, check all that apply)  []SWEATING (RAPID PULSE), [] NAUSEA/VOMITING, []SHAKY/JITTERY/TREMORS, []DIZZINESS, []UNABLE TO SLEEP, []SEIZURES, []AGITATION, [] DIARRHEA, []HEADACHE, []DIMINISHED APPETITE,[]FATIGUE/EXTREMELY TIRED, []HALLUCINATIONS, [x]SAD /DEPRESSED  FEELING, []FEVER,[]MUSCLE ACHES, []UNABLE TO EAT, []VIVID /UNPLEASANT DREAMS, []PSYCHOSIS, []IRRITABILITY, []CONFUSED/DISRUPTED SPEECH, []SENSITIVITY TO NOISE, [x] ANXIETY/WORRIED,[]HIGH BLOOD PRESSURE          Chemical use most recent 12 months outside a facility and other significant use history (client self-report)  Primary Drug Used  Age of First Use  Most Recent Pattern of Use and Duration    Date of last use and time, if needed  Withdrawal Potential? Requiring special care  Method of use   (oral, smoked, snort, IV, etc)    Alcohol  None        Marijuana/Hashish  11 Couple puffs a week  2017  No  Smoke    Cocaine/Crack  None        Meth/Amphetamines  42 Half gram  1/10/2017 No  Snorting, IV in the past over 1 year ago    Heroin  None        Other Opiates/Synthetics  None        Inhalants  None         Benzodiazepines  None        Hallucinogens  None        Barbiturates/Sedatives/Hypnotics  None        Over-the-Counter Drugs  None        Other  None        Nicotine  None            Dimension I Risk Ratin  Reason Risk Rating Assigned: Pt reports last use of meth on 1/10/17 and marijuana on 17.   Pt is not reporting any concern for withdrawal issues at intake.      Dimension II Biomedical Conditions:    Any known health conditions: Yes[x]/No[]    Ever previously treated/diagnosed with any eating disorder?  YES [x]/[] NO  Explain:     List Health Concerns/Conditions Reported: Peripheral nerve disease, Chronic pain syndrome, Osteomyelitis of spine, TBI (traumatic brain injury)  Are Health Concerns/Conditions being treated? YES[]/NO [x]  By Whom?   Are you pregnant: Yes[]/No[x]  OB Care Received: Yes[]/No[x]       CPS Call needed: Yes[]/No[x]        Dimension II Risk Ratin  Reason Risk Rating Assigned: Pt states he has not been taking any medications since he got out of  inpatient treatment.  Pt said he could not afford to co-pay and was waiting for the [facility] to come, but there was an fraudulent use  reported and he has not gotten the money yet.  Pt states he has never seen one particular PCP and always scattered.     Dimension III Emotional/Behavioral/Cognitive:    Oriented to person, place, time, situation? YES [x]/ NO []   Current Mental Health Services: YES [x]/ NO []   Patient is not seeing any mental health providers yet but psych appointment is scheduled for 3/13.    Past Hospitalization for MH or psychiatric problems: YES[x] / NO[]  How many Hospitalizations: 3   Last Hospitalization; date and location:   3 times in the last 2 months.    Past or Current Issues with Gambling (Explain): No   Prior Treatment for Gambling: YES[] / NO[x]  MH Diagnoses:  Bipolar Disorder Type 1, Anxiety Disorder, Depression, PTSD  Psychiatrist: Dr. Navjot Vega     Clinic: [facility], [facility] on 3/13.    Current Psychotropic Medications:  Zyprexa, Topamax   Taking medications as prescribed:  YES[] / NO[x]    Medications Helpful: YES[] / NO[x]   Pt states he could not afford the co-pay and has not taken any meds since inpatient discharge.   Current Suicidal Ideation: YES[x] / NO[]  If yes, any plan?  YES[x] / NO[x]  What is plan?: Pt states he thinks about being dedt but dots not have any plan.    Previous Suicide Attempts?  YES[] / NO[x]  Explain:   Current Homicidal Ideation: YES[]/NO[x] If yes, any plan? YES[]/NO[]  What is plan?:   Previous Homicide Attempts? YES[]/NO[x]Explain:   Suicidal/Homicidal Ideation in last 30 days? YES[]/NO [x] (Explain)  Family history of substance and/or mental health diagnosis/issues?  YES[x]/NO [] (Explain)  Brothers are using drugs   History of abuse (Physical, Emotional, Sexual)? YES[]/NO [x] (Explain)      Dimension III Risk Rating: 3  Reason Risk Rating Assigned: Pt reports past mental health diagnosis of PTSD, Bipolar Type 1, Anxiety D/O, Depression.  Patient does not have any mental health providers at this time but he is scheduled for 313/17 with Dr. Vega  at [facility].  Pt reports he thinks about dying all the time but does not report any plan.      Dimension IV Readiness to Change:    Mandated, or coerced into assessment or treatment:  YES[x] / NO  []  Does client feel there is a problem:  YES[x] / NO[]  Verbalization of need/desire to change:  YES [x]/ NO[]    Impression of : (Check all that apply):  [x]Cooperative, [] genuinely motivated, []ambivalent about change, [x]minimal awareness, [] low motivation, []minimally cooperative, []non-compliant, []overtly hostile, []unwilling to explore change  Are there any spiritual, cultural, or other special needs to be addressed for client to be successful in treatment? Yes[]/No  [x]   Explain:     Hazardous activities engaged in which placed self or others in danger (i.e., operating a motor vehicle, unsafe sex, sharing needles, etc.)?   Driving       Dimension IV Risk Rating: 3  Reason Risk Rating Assigned: Patient is on [facility] probation for felony drug possession from December 2015, possession of Shoplifting Jose J from September 2015.     Patient has three felonies convictions since 2011.  Pt is mandated by probation to complete treatment.  Pt states he has not been taking any meds since he got out of inpatient treatment because he could not afford the co-pay.           Dimension V Relapse/Continued Use/Continued Problem Potential     Client age at First Treatment: 1998   Lifetime # of CD Treatments:  3  List program, dates, and status of completion (within last five years): [facility] CD inpatient treatment, completed on 1/31/2017.     Longest Period of Abstinence: 1 year   How did you accomplish this?  Locked up     Risk Taking/Problem Behaviors Related to Use: driving, shop lifting       Dimension V Risk Rating:  3 Reason Risk Rating Assigned:   Pt used marijuana after he got out of inpatient treatment.  Pt states he is aware of UA and responsibility to stay clean but states  marijuana helped his chronic pain.        Dimension VI Recovery Environment   Family support:  YES[x] / NO[]  Peer Sober Support:  YES[x] / NO []  Current living circumstances:  Lives at father's  Specific activities participating in which do not involve substance use: drawing people's faces  Specific activities participating in which do involve substance use:none   Environment supportive of recovery:  YES[x] / NO[]  People, things that threaten recovery: YES[x]/NO  []  Explain:  Expected family involvement during treatment services:  None   Current Legal Involvement:  On probation with [facility]   Legal Consequences related to use: probation violation   Occupational/Academic consequences related to use: retired   Current support network for recovery (including community-based recovery support): none   Do you belong to a [place]: YES[]/NO[x] Which [place]?   Reside on reservation: YES[]/NO[x]    Dimension VI Risk Ratin  Reason Risk Rating Assigned: Pt states he lives at his father's for now but looking for his own apartment.  Pt sates he does not go to  and just hang out with sober people.  Pt states he used to work and retirede/disabled at this time with monthly income about $1100/month.      DSM-V Criteria for Substance Abuse  Instructions:  Determine whether the client currently meets the criteria for a Substance Use Disorder using the diagnostic criteria in the  DSM-V, pp. 481-589. Current means during the most recent 12 months outside a facility that controls access to substances.    Category of substance Severity ICD-10 Code/DSM V Code  Alcohol Use Disorder Mild  Moderate  Severe (F10.10) (305.00)  (F10.20) (303.90)  (F10.20) (303.90)   Cannabis Use Disorder Mild  Moderate  Severe (F12.10) (305.20)  (F12.20) (304.30)  (F12.20) (304.30)   Hallucinogen Use Disorder Mild  Moderate  Severe (F16.10) (305.30)  (F16.20) (304.50)  (F16.20) (304.50)   Inhalant Use Disorder Mild  Moderate  Severe (F18.10)  (305.90)  (F18.20) (304.60)  (F18.20) (304.60)   Opioid Use Disorder Mild  Moderate  Severe (F11.10) (305.50)  (F11.20) (304.00)  (F11.20) (304.00)   Sedative, Hypnotic, or Anxiolytic Use Disorder Mild  Moderate  Severe (F13.10) (305.40)  (F13.20) (304.10)  (F13.20) (304.10)   Stimulant Related Disorders Mild            Moderate            Severe   (F15.10) (305.70) Amphetamine type substance  (F14.10) (305.60) Cocaine  (F15.10) (305.70) Other or unspecified stimulant    (F15.20) (304.40) Amphetamine type substance  (F14.20) (304.20) Cocaine  (F15.20) (304.40) Other or unspecified stimulant    (F15.20) (304.40) Amphetamine type substance  (F14.20) (304.20) Cocaine  (F15.20) (304.40) Other or unspecified stimulant   DisorderTobacco use Disorder Mild  Moderate  Severe   (Z72.0) (305.1)  (F17.200) (305.1)  (F17.200) (305.1)   Other (or unknown) Substance Use Disorder Mild  Moderate  Severe (F19.10) (305.90)  (F19.20) (304.90)  (F19.20) (304.90)     Diagnostic Impression:____Amphetamine Use Disorder, Severe (F15.20) (304.40), Cannabis Use Disorder, Moderate (F12.20) (304.30)___________________________________________________________    Assessment Completed Within 3 Sessions of Admission: YES[]/NO[]  If NO, date assessment to be completed noted in Treatment Plan: YES[]/NO[]  Signature of Counselor:__CYNTHIA Evans________________________ Date and Time of Signature: ___2/13/2017, 10:46 AM________________        Intake Note:   D) Porfirio Clarke is a 51 y.o.   White or  male who is referred to [facility] via probation with funding from [facility]. Patient orientated x 3. Patient meets criteria for   Patient Active Problem List   Diagnosis   ? Methamphetamine use disorder, severe   ? Bipolar I disorder per history   ? Peripheral nerve disease   ? Anxiety   ? Chronic pain syndrome   ? Osteomyelitis of spine   ? TBI (traumatic brain injury)     Patient appears appropriate for OP/Service  Coordination.   A)Completed intake assessment; preliminary paperwork; counselor & supervisor license number and contact info., Patient Bill of Rights, , OP rules/regulations, , Abuse Prevention Plan,, confidentiality & HIPPA policies, , grievance procedure,, presented ROIs, TB & HIV/AIDS policies & resources, and Vulnerable Adult policy, .   Conducted Vulnerable Adult Assessment yes .   R)No special Vulnerable Adult needed at this time.   Patient signed and agreed to counselor & supervisor license number and contact info., Patient Bill of Rights, OP rules/regulations, , Abuse Prevention Plan,, confidentiality & HIPPA policies, , grievance procedure,, presented ROIs, TB & HIV/AIDS policies & resources, and Vulnerable Adult policy. Patient scored low on PANSI screen.   Dimension #1 - Withdrawal Potential - concern. Level 0   Dimension #2 - Biomedical - concern.-Level 1  Dimension #3 - Emotional , Behavioral and Cognitive - concern. Level 3   Dimension #4 - Treatment Resistance - concern. Level 3  Dimension #5 - Relapse Potential - concern. Level 3  Dimension #6 - Recovery Environment - concern. Level 2  T) Explained counselor & supervisor license number and contact info., Patient Bill of Rights, DOP rules/regulations, , Abuse Prevention Plan,, confidentiality & HIPPA policies, , grievance procedure,, presented ROIs, , TB & HIV/AIDS policies & resources, and Vulnerable Adult policy.

## 2022-10-07 NOTE — PROGRESS NOTES
LATE ENTRY FOR 12/12/2017    Porfirio Clarke attended 3 hours of group therapy on 12/12/2017.     12/13/2017 9:28 AM Heike Nagel   [Alert] : alert [No Acute Distress] : no acute distress [Normocephalic] : normocephalic [Anterior Sidney Closed] : anterior fontanelle closed [Red Reflex Bilateral] : red reflex bilateral [PERRL] : PERRL [Normally Placed Ears] : normally placed ears [Auricles Well Formed] : auricles well formed [Clear Tympanic membranes with present light reflex and bony landmarks] : clear tympanic membranes with present light reflex and bony landmarks [No Discharge] : no discharge [Nares Patent] : nares patent [Palate Intact] : palate intact [Uvula Midline] : uvula midline [Tooth Eruption] : tooth eruption  [Supple, full passive range of motion] : supple, full passive range of motion [No Palpable Masses] : no palpable masses [Symmetric Chest Rise] : symmetric chest rise [Clear to Auscultation Bilaterally] : clear to auscultation bilaterally [Regular Rate and Rhythm] : regular rate and rhythm [S1, S2 present] : S1, S2 present [No Murmurs] : no murmurs [+2 Femoral Pulses] : +2 femoral pulses [Soft] : soft [NonTender] : non tender [Non Distended] : non distended [Normoactive Bowel Sounds] : normoactive bowel sounds [No Hepatomegaly] : no hepatomegaly [No Splenomegaly] : no splenomegaly [Central Urethral Opening] : central urethral opening [Testicles Descended Bilaterally] : testicles descended bilaterally [Patent] : patent [Normally Placed] : normally placed [No Abnormal Lymph Nodes Palpated] : no abnormal lymph nodes palpated [No Clavicular Crepitus] : no clavicular crepitus [Symmetric Buttocks Creases] : symmetric buttocks creases [No Spinal Dimple] : no spinal dimple [NoTuft of Hair] : no tuft of hair [Cranial Nerves Grossly Intact] : cranial nerves grossly intact [No Rash or Lesions] : no rash or lesions [FreeTextEntry3] : some residual fluid on the left

## (undated) DEVICE — ADH LIQUID MASTISOL TOPICAL VIAL 2-3ML 0523-48

## (undated) DEVICE — SPONGE BALL KERLIX ROUND XL W/O STRING LATEX 4935

## (undated) DEVICE — SU VICRYL 4-0 PS-2 18" UND J496H

## (undated) DEVICE — WIPES FOLEY CARE SURESTEP PROVON DFC100

## (undated) DEVICE — PREP POVIDONE IODINE SOLUTION 10% 4OZ

## (undated) DEVICE — ESU ELEC NDL 1" COATED/INSULATED E1465

## (undated) DEVICE — LINEN TOWEL PACK X6 WHITE 5487

## (undated) DEVICE — BUR STRK SIDE CUT 2.1X5.1X44.8MM CARBIDE 6FLUTE 1607-002-109

## (undated) DEVICE — NDL ANGIOCATH 14GA 1.25" 4048

## (undated) DEVICE — SOL WATER IRRIG 1000ML BOTTLE 2F7114

## (undated) DEVICE — SYR EAR BULB 3OZ 0035830

## (undated) DEVICE — DRAIN PENROSE 0.25"X18" LATEX FREE GR201

## (undated) DEVICE — LINEN TOWEL PACK X5 5464

## (undated) DEVICE — DRAPE U SPLIT 74X120" 29440

## (undated) DEVICE — ESU GROUND PAD ADULT W/CORD E7507

## (undated) DEVICE — ESU ELEC BLADE 2.75" COATED/INSULATED E1455

## (undated) DEVICE — SU SILK 2-0 SH 30" K833H

## (undated) DEVICE — CATH TRAY FOLEY SURESTEP 16FR W/URNE MTR STLK LATEX A303316A

## (undated) DEVICE — BLADE KNIFE SURG 15 371115

## (undated) DEVICE — BONE WAX 2.5GM W31G

## (undated) DEVICE — TOOTHBRUSH ADULT NON STERILE MDS136850

## (undated) DEVICE — BLADE SAW OSCIL/SAG STRK MICRO 5.5X11.5X0.38MM 2296-003-410

## (undated) DEVICE — SU CHROMIC 3-0 FS-2 27" 636

## (undated) DEVICE — SUCTION TIP YANKAUER W/O VENT K86

## (undated) DEVICE — DRSG JAWBRA  95

## (undated) DEVICE — TEST TUBE W/SCREW CAP 17361

## (undated) DEVICE — SOL NACL 0.9% IRRIG 1000ML BOTTLE 2F7124

## (undated) DEVICE — SYR 10ML LL W/O NDL 302995

## (undated) DEVICE — BUR STRK EGG 4.0X9.5X54MM 10 FLUTE 1608-002-035

## (undated) DEVICE — BLADE SAW RECIP STRK EXCURSION 14.5X.635MM 5100-037-011

## (undated) DEVICE — BUR STRK EGG 4.0X44.5MM 10 FLUTE 1607-002-035

## (undated) DEVICE — DRAPE SHEET REV FOLD 3/4 9349

## (undated) DEVICE — BLADE CLIPPER SGL USE 9680

## (undated) DEVICE — DRSG GAUZE 4X4" TRAY 6939

## (undated) DEVICE — PAD CHUX UNDERPAD 23X24" 7136

## (undated) DEVICE — BRUSH SURGICAL EZ SCRUB PLAIN 371603

## (undated) DEVICE — SU VICRYL 3-0 X-1 27" UND J458H

## (undated) DEVICE — DRSG TELFA 3X8" 1238

## (undated) DEVICE — TAPE CLOTH 2" CARDINAL 3TRCL02

## (undated) DEVICE — Device

## (undated) RX ORDER — FENTANYL CITRATE 50 UG/ML
INJECTION, SOLUTION INTRAMUSCULAR; INTRAVENOUS
Status: DISPENSED
Start: 2020-12-30

## (undated) RX ORDER — SODIUM CHLORIDE, SODIUM LACTATE, POTASSIUM CHLORIDE, CALCIUM CHLORIDE 600; 310; 30; 20 MG/100ML; MG/100ML; MG/100ML; MG/100ML
INJECTION, SOLUTION INTRAVENOUS
Status: DISPENSED
Start: 2020-12-30

## (undated) RX ORDER — LIDOCAINE HYDROCHLORIDE 20 MG/ML
INJECTION, SOLUTION EPIDURAL; INFILTRATION; INTRACAUDAL; PERINEURAL
Status: DISPENSED
Start: 2020-12-30

## (undated) RX ORDER — ONDANSETRON 2 MG/ML
INJECTION INTRAMUSCULAR; INTRAVENOUS
Status: DISPENSED
Start: 2020-12-30

## (undated) RX ORDER — HYDROMORPHONE HYDROCHLORIDE 1 MG/ML
INJECTION, SOLUTION INTRAMUSCULAR; INTRAVENOUS; SUBCUTANEOUS
Status: DISPENSED
Start: 2020-12-30

## (undated) RX ORDER — PROPOFOL 10 MG/ML
INJECTION, EMULSION INTRAVENOUS
Status: DISPENSED
Start: 2020-12-30

## (undated) RX ORDER — FENTANYL CITRATE-0.9 % NACL/PF 10 MCG/ML
PLASTIC BAG, INJECTION (ML) INTRAVENOUS
Status: DISPENSED
Start: 2020-12-30

## (undated) RX ORDER — DEXAMETHASONE SODIUM PHOSPHATE 4 MG/ML
INJECTION, SOLUTION INTRA-ARTICULAR; INTRALESIONAL; INTRAMUSCULAR; INTRAVENOUS; SOFT TISSUE
Status: DISPENSED
Start: 2020-12-30

## (undated) RX ORDER — OXYMETAZOLINE HYDROCHLORIDE 0.05 G/100ML
SPRAY NASAL
Status: DISPENSED
Start: 2020-12-30

## (undated) RX ORDER — ALBUTEROL SULFATE 90 UG/1
AEROSOL, METERED RESPIRATORY (INHALATION)
Status: DISPENSED
Start: 2020-12-30